# Patient Record
Sex: MALE | Race: WHITE | NOT HISPANIC OR LATINO | Employment: OTHER | URBAN - NONMETROPOLITAN AREA
[De-identification: names, ages, dates, MRNs, and addresses within clinical notes are randomized per-mention and may not be internally consistent; named-entity substitution may affect disease eponyms.]

---

## 2017-01-13 ENCOUNTER — OFFICE VISIT - GICH (OUTPATIENT)
Dept: INTERNAL MEDICINE | Facility: OTHER | Age: 62
End: 2017-01-13

## 2017-01-13 ENCOUNTER — HISTORY (OUTPATIENT)
Dept: INTERNAL MEDICINE | Facility: OTHER | Age: 62
End: 2017-01-13

## 2017-01-13 DIAGNOSIS — I48.91 ATRIAL FIBRILLATION (H): ICD-10-CM

## 2017-01-13 LAB
ANION GAP - HISTORICAL: 4 (ref 5–18)
BUN SERPL-MCNC: 19 MG/DL (ref 7–25)
BUN/CREAT RATIO - HISTORICAL: 15
CALCIUM SERPL-MCNC: 9.6 MG/DL (ref 8.6–10.3)
CHLORIDE SERPLBLD-SCNC: 106 MMOL/L (ref 98–107)
CO2 SERPL-SCNC: 27 MMOL/L (ref 21–31)
CREAT SERPL-MCNC: 1.29 MG/DL (ref 0.7–1.3)
ERYTHROCYTE [DISTWIDTH] IN BLOOD BY AUTOMATED COUNT: 11.4 % (ref 11.5–15.5)
GFR IF NOT AFRICAN AMERICAN - HISTORICAL: 57 ML/MIN/1.73M2
GLUCOSE SERPL-MCNC: 82 MG/DL (ref 70–105)
HCT VFR BLD AUTO: 46.7 % (ref 37–53)
HEMOGLOBIN: 15.9 G/DL (ref 13.5–17.5)
MCH RBC QN AUTO: 32.1 PG (ref 26–34)
MCHC RBC AUTO-ENTMCNC: 34.1 G/DL (ref 32–36)
MCV RBC AUTO: 94 FL (ref 80–100)
PLATELET # BLD AUTO: 191 THOU/CU MM (ref 140–440)
PMV BLD: 10.2 FL (ref 6.5–11)
POTASSIUM SERPL-SCNC: 4.3 MMOL/L (ref 3.5–5.1)
RED BLOOD COUNT - HISTORICAL: 4.97 MIL/CU MM (ref 4.3–5.9)
SODIUM SERPL-SCNC: 137 MMOL/L (ref 133–143)
WHITE BLOOD COUNT - HISTORICAL: 8.4 THOU/CU MM (ref 4.5–11)

## 2017-01-16 ENCOUNTER — AMBULATORY - GICH (OUTPATIENT)
Dept: INTERNAL MEDICINE | Facility: OTHER | Age: 62
End: 2017-01-16

## 2017-01-17 ENCOUNTER — HISTORY (OUTPATIENT)
Dept: EMERGENCY MEDICINE | Facility: OTHER | Age: 62
End: 2017-01-17

## 2017-01-17 ENCOUNTER — MEDICAL CORRESPONDENCE (OUTPATIENT)
Facility: CLINIC | Age: 62
End: 2017-01-17
Payer: COMMERCIAL

## 2017-01-17 ENCOUNTER — HOSPITAL ENCOUNTER (OUTPATIENT)
Dept: RADIOLOGY | Facility: OTHER | Age: 62
End: 2017-01-17
Attending: INTERNAL MEDICINE

## 2017-01-17 ENCOUNTER — TRANSFERRED RECORDS (OUTPATIENT)
Dept: HEALTH INFORMATION MANAGEMENT | Facility: CLINIC | Age: 62
End: 2017-01-17

## 2017-01-17 DIAGNOSIS — I48.91 ATRIAL FIBRILLATION (H): ICD-10-CM

## 2017-01-17 PROCEDURE — 93306 TTE W/DOPPLER COMPLETE: CPT | Mod: 26 | Performed by: INTERNAL MEDICINE

## 2017-01-19 ENCOUNTER — HISTORY (OUTPATIENT)
Dept: INTERNAL MEDICINE | Facility: OTHER | Age: 62
End: 2017-01-19

## 2017-01-20 ENCOUNTER — OFFICE VISIT - GICH (OUTPATIENT)
Dept: INTERNAL MEDICINE | Facility: OTHER | Age: 62
End: 2017-01-20

## 2017-01-20 ENCOUNTER — HISTORY (OUTPATIENT)
Dept: INTERNAL MEDICINE | Facility: OTHER | Age: 62
End: 2017-01-20

## 2017-01-20 DIAGNOSIS — N18.9 CHRONIC KIDNEY DISEASE: ICD-10-CM

## 2017-01-20 DIAGNOSIS — I48.91 ATRIAL FIBRILLATION (H): ICD-10-CM

## 2017-01-20 DIAGNOSIS — I48.92 ATRIAL FLUTTER (H): ICD-10-CM

## 2017-01-20 DIAGNOSIS — R35.1 NOCTURIA: ICD-10-CM

## 2017-01-20 DIAGNOSIS — J06.9 ACUTE UPPER RESPIRATORY INFECTION: ICD-10-CM

## 2017-01-20 DIAGNOSIS — N17.9 ACUTE KIDNEY FAILURE (H): ICD-10-CM

## 2017-01-20 LAB
CREAT SERPL-MCNC: 1.25 MG/DL (ref 0.7–1.3)
GFR IF NOT AFRICAN AMERICAN - HISTORICAL: 59 ML/MIN/1.73M2

## 2017-01-26 ENCOUNTER — HISTORIC RESULTS (OUTPATIENT)
Dept: ADMINISTRATIVE | Age: 62
End: 2017-01-26

## 2017-01-31 ENCOUNTER — HISTORIC RESULTS (OUTPATIENT)
Dept: ADMINISTRATIVE | Age: 62
End: 2017-01-31

## 2017-02-01 ENCOUNTER — AMBULATORY - GICH (OUTPATIENT)
Dept: INTERNAL MEDICINE | Facility: OTHER | Age: 62
End: 2017-02-01

## 2017-02-01 ENCOUNTER — AMBULATORY - GICH (OUTPATIENT)
Dept: FAMILY MEDICINE | Facility: OTHER | Age: 62
End: 2017-02-01

## 2017-02-14 ENCOUNTER — AMBULATORY - GICH (OUTPATIENT)
Dept: FAMILY MEDICINE | Facility: OTHER | Age: 62
End: 2017-02-14

## 2017-02-14 DIAGNOSIS — I48.19 PERSISTENT ATRIAL FIBRILLATION (H): ICD-10-CM

## 2017-02-16 ENCOUNTER — HISTORIC RESULTS (OUTPATIENT)
Dept: ADMINISTRATIVE | Age: 62
End: 2017-02-16

## 2017-03-21 ENCOUNTER — AMBULATORY - GICH (OUTPATIENT)
Dept: FAMILY MEDICINE | Facility: OTHER | Age: 62
End: 2017-03-21

## 2017-03-21 DIAGNOSIS — I48.19 PERSISTENT ATRIAL FIBRILLATION (H): ICD-10-CM

## 2017-03-23 ENCOUNTER — HISTORIC RESULTS (OUTPATIENT)
Dept: ADMINISTRATIVE | Age: 62
End: 2017-03-23

## 2017-07-31 ENCOUNTER — OFFICE VISIT - GICH (OUTPATIENT)
Dept: FAMILY MEDICINE | Facility: OTHER | Age: 62
End: 2017-07-31

## 2017-07-31 ENCOUNTER — HISTORY (OUTPATIENT)
Dept: FAMILY MEDICINE | Facility: OTHER | Age: 62
End: 2017-07-31

## 2017-07-31 DIAGNOSIS — L72.0 EPIDERMAL CYST: ICD-10-CM

## 2017-10-18 ENCOUNTER — AMBULATORY - GICH (OUTPATIENT)
Dept: FAMILY MEDICINE | Facility: OTHER | Age: 62
End: 2017-10-18

## 2017-10-18 DIAGNOSIS — Z23 ENCOUNTER FOR IMMUNIZATION: ICD-10-CM

## 2017-10-20 ENCOUNTER — AMBULATORY - GICH (OUTPATIENT)
Dept: FAMILY MEDICINE | Facility: OTHER | Age: 62
End: 2017-10-20

## 2017-11-09 ENCOUNTER — OFFICE VISIT - GICH (OUTPATIENT)
Dept: FAMILY MEDICINE | Facility: OTHER | Age: 62
End: 2017-11-09

## 2017-11-09 ENCOUNTER — HISTORY (OUTPATIENT)
Dept: FAMILY MEDICINE | Facility: OTHER | Age: 62
End: 2017-11-09

## 2017-11-09 DIAGNOSIS — Z12.5 ENCOUNTER FOR SCREENING FOR MALIGNANT NEOPLASM OF PROSTATE: ICD-10-CM

## 2017-11-09 DIAGNOSIS — I48.0 PAROXYSMAL ATRIAL FIBRILLATION (H): ICD-10-CM

## 2017-11-09 LAB
ANION GAP - HISTORICAL: 12 (ref 5–18)
BUN SERPL-MCNC: 20 MG/DL (ref 7–25)
BUN/CREAT RATIO - HISTORICAL: 18
CALCIUM SERPL-MCNC: 9.7 MG/DL (ref 8.6–10.3)
CHLORIDE SERPLBLD-SCNC: 104 MMOL/L (ref 98–107)
CO2 SERPL-SCNC: 27 MMOL/L (ref 21–31)
CREAT SERPL-MCNC: 1.14 MG/DL (ref 0.7–1.3)
GFR IF NOT AFRICAN AMERICAN - HISTORICAL: >60 ML/MIN/1.73M2
GLUCOSE SERPL-MCNC: 96 MG/DL (ref 70–105)
POTASSIUM SERPL-SCNC: 4.8 MMOL/L (ref 3.5–5.1)
PSA TOTAL (DIAGNOSTIC) - HISTORICAL: 1.23 NG/ML
SODIUM SERPL-SCNC: 143 MMOL/L (ref 133–143)

## 2017-11-13 ENCOUNTER — AMBULATORY - GICH (OUTPATIENT)
Dept: SCHEDULING | Facility: OTHER | Age: 62
End: 2017-11-13

## 2017-12-28 NOTE — PROGRESS NOTES
"Patient Information     Patient Name MRN Sex Darron Mora [id number] Male 1955      Progress Notes by Murali Brown MD at 2017  3:45 PM     Author:  Murali Brown MD Service:  (none) Author Type:  Physician     Filed:  2017  4:39 PM Encounter Date:  2017 Status:  Signed     :  Murali Brown MD (Physician)            SUBJECTIVE:  Darron Tatum is a 61 y.o. male who presents for evaluation of a lump on his back. This has been present for quite some time and over the past couple of weeks has become slightly irritated. Has noted some itching in this region. His wife has noticed that this appears a little red. He previously had an inclusion cyst removed from his neck.    No Known Allergies and   Current Outpatient Prescriptions on File Prior to Visit       Medication  Sig Dispense Refill     medication order composer 1 Cap once daily. Nutraview,  Lutein       sotalol (BETAPACE) 80 mg tablet Take 1 tablet by mouth 2 times daily before meals. 180 tablet 3     travoprost 0.004% (TRAVATAN Z) 0.004 % ophthalmic solution Place 1 Drop into both eyes at bedtime. 1 Bottle 0     No current facility-administered medications on file prior to visit.        OBJECTIVE:  /80  Temp 97.2  F (36.2  C) (Temporal)  Ht 1.911 m (6' 3.25\")  Wt 88.5 kg (195 lb)  BMI 24.21 kg/m2  EXAM:  General Appearance: Pleasant, alert, appropriate appearance for age. No acute distress.  Skin: Typical appearing inclusion cyst noted over the left upper back. This measures about 2 cm in maximal diameter and is mildly erythematous. No fluctuance or tenderness. A tiny amount of drainage is noted.    ASSESSMENT/Plan :      ICD-10-CM    1. Inclusion cyst  Recommended periodic application of heat to this region. Return for consideration of I&D if worsening. Once current inflammation resolves, he may have this electively excised if desired.  L72.0        Murali Brown MD          "

## 2017-12-28 NOTE — PROGRESS NOTES
"Patient Information     Patient Name MRN Sex Darron Mora [id number] Male 1955      Progress Notes by Murali Brown MD at 2017  9:45 AM     Author:  Murali Brown MD Service:  (none) Author Type:  Physician     Filed:  2017 12:31 PM Encounter Date:  2017 Status:  Signed     :  Murali Brown MD (Physician)            PREOPERATIVE CLEARANCE  Date of Exam: 2017    Nursing Notes:   Yvonne Bob  2017 10:13 AM  Signed  Date of Surgery: 17  Type of Surgery: colonoscopy  Surgeon: Dr. Cortes  Hospital:  [facility]      Fever/Chills or other infectious symptoms in past month: no  >10lb weight loss in past two months: no  O2 SAT: 97    Health Care Directive/Code status:  yes  Hx of blood transfusions:   (NO)   Td up to date:    History of VRE/MRSA:  (NO)     Preoperative Evaluation: Obstructive Sleep Apnea screening    S: Snore -  Do you snore loudly? (louder than talking or loud enough to be heard through closed doors)(NO)  T: Tired - Do you often feel tired, fatigued, or sleepy during the daytime?(YES)  O: Observed - Has anyone ever observed you stop breathing during your sleep?(NO)  P: Pressure - Do you have or are you being treated for high blood pressure?(NO)  B: BMI - BMI greater than 35kg/m2?(NO)  A: Age - Age over 50 years old?(YES)  N: Neck - Neck circumference greater than 40 cm?(NO)  G: Gender - Gender: Male?(YES)    Total number of \"YES\" responses:  3    Scoring: Low risk of GAMAL 0-2  At Risk of GAMAL: >3 High Risk of GAMAL: 5-8            HPI:  Darron Tatum is a 61 y.o. male who presents for preoperative clearance as noted above. He has a history of paroxysmal atrial fibrillation undergoing elective cardioversion in February of this year. He continues on sotalol and believes he has been in sinus rhythm since.    Problem List:   Patient Active Problem List     Diagnosis  Code     NEPHROLITHIASIS, HX OF Z87.442     INCREASED " INTRAOCULAR PRESSURE H40.009     Actinic keratosis of left cheek L57.0     BPH (benign prostatic hypertrophy) N40.0     Onychomycosis B35.1     Paroxysmal atrial fibrillation (HC) I48.0      Histories:  Past Medical History:     Diagnosis  Date     Atrial fibrillation (HC) 10/31/2014     BPH (benign prostatic hypertrophy) 9/26/2013     Kidney stone     passed spontaneous, age of 30      Preglaucoma       Past Surgical History:      Procedure  Laterality Date     CARDIOVERSION  02/2017    Atrial Fibrillation       COLONOSCOPY SCREENING  2007, 2012    Follow up 2017, history of polyps       HAND FINGER SURGERY      repair, left long finger injury       HERNIA REPAIR  1998    Right, repair with mesh       REPAIR RETINAL DETACH  20s    Retinal hole       Social History     Social History        Marital status:       Spouse name: N/A     Number of children:  N/A     Years of education:  N/A     Occupational History      Not on file.     Social History Main Topics          Smoking status:   Never Smoker      Smokeless tobacco:   Never Used      Alcohol use   2.5 oz/week     5 Standard drinks or equivalent per week        Comment: glass of Wine, 5-6 times a week       Drug use:   No      Sexual activity:   Yes      Partners:  Female      Birth control/ protection:  Post-menopausal      Other Topics   Concern      Service  No     Blood Transfusions  No     Caffeine Concern  Yes     2 cups daily      Sleep Concern  No     Exercise  Yes     Seat Belt  Yes     Social History Narrative      at the age of 41 and has two grown stepsons. Retired.  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  Wife teaches piano lessons.                 Family History       Problem   Relation Age of Onset     Cancer  Mother      brain tumor       Hypertension  Mother      Genitourinary Disease  Father      BPH       Other  Father      peptic ulcer disease       Other  Brother      detached retina       Genitourinary  "Disease  Brother      BPH       Skin cancer  Brother      Other  Brother      detached retina       Heart Disease  Brother      atrial fibrillation/flutter with TIA's       Other  Sister      osteopenia        Allergies: Review of patient's allergies indicates no known allergies.   Latex Allergy: no    Current Medications:  Current Outpatient Rx       Medication  Sig Dispense Refill     medication order composer 1 Cap once daily. Nutraview,  Lutein       sotalol (BETAPACE) 80 mg tablet Take 1 tablet by mouth 2 times daily before meals. 180 tablet 3     travoprost 0.004% (TRAVATAN Z) 0.004 % ophthalmic solution Place 1 Drop into both eyes at bedtime. 1 Bottle 0     Medications have been reviewed by me and are current to the best of my knowledge and ability.    Recent use of: no recent use of aspirin (ASA), NSAIDS or steroids    HABITS:   Social History       Substance Use Topics         Smoking status:   Never Smoker     Smokeless tobacco:   Never Used     Alcohol use   2.5 oz/week     5 Standard drinks or equivalent per week        Comment: glass of Wine, 5-6 times a week    Tetanus up to date yes    Anesthesia Complications: None  History of abnormal bleeding : None  History of Blood Transfusions: No    Health Care Directive or Living Will: no    REVIEW OF SYSTEMS:  Respiratory: Negative  Cardiovascular: Negative  Gastrointestinal: Negative        EXAM:   /76  Pulse 54  Temp 96.8  F (36  C) (Temporal)   Ht 1.911 m (6' 3.25\")  Wt 88.6 kg (195 lb 4 oz)  SpO2 97%  BMI 24.24 kg/m2 Body mass index is 24.24 kg/(m^2).  EXAM:  General Appearance: Pleasant, alert, appropriate appearance for age. No acute distress  OroPharynx Exam:  Dental hygiene adequate. Normal buccal mucosa. Normal pharynx.  Neck Exam:  Supple, no masses or nodes.  Thyroid Exam: No nodules or enlargement.  Chest/Respiratory Exam: Normal chest wall and respirations. Clear to auscultation.  Cardiovascular Exam: Bradycardic rate with a regular " rhythm, no murmurs.  Gastrointestinal Exam: Soft, non-tender, no masses or organomegaly.  Rectal Exam: Prostate is mildly enlarged without nodularity or tenderness.  Genitourinary Exam Male: Normal male genitalia. No discharge or penile ulcerations. No testicular masses or swelling.  Lymphatic Exam: Non-palpable nodes in neck, clavicular, axillary, or inguinal regions.  Musculoskeletal Exam: Back is straight and non-tender, full ROM of upper and lower extremities.  Foot Exam: Left and right foot: good pedal pulses, no lesions, nail hygiene good.  Skin: no rash or abnormalities  Neurologic Exam: Nonfocal, symmetric DTRs, normal gross motor, tone coordination and no tremor.  Psychiatric Exam: Alert and oriented - appropriate affect.    Results for orders placed or performed in visit on 11/09/17      BASIC METABOLIC PANEL      Result  Value Ref Range    SODIUM 143 133 - 143 mmol/L    POTASSIUM 4.8 3.5 - 5.1 mmol/L    CHLORIDE 104 98 - 107 mmol/L    CO2,TOTAL 27 21 - 31 mmol/L    ANION GAP 12 5 - 18                    GLUCOSE 96 70 - 105 mg/dL    CALCIUM 9.7 8.6 - 10.3 mg/dL    BUN 20 7 - 25 mg/dL    CREATININE 1.14 0.70 - 1.30 mg/dL    BUN/CREAT RATIO           18                    GFR if African American >60 >60 ml/min/1.73m2    GFR if not African American >60 >60 ml/min/1.73m2   PSA, TOTAL      Result  Value Ref Range    PSA TOTAL (DIAGNOSTIC) 1.225 <=3.100 ng/mL       DIAGNOSTICS:   1. EKG: EKG FINDINGS - sinus bradycardia with nonspecific ST abnormality not significantly changed compared to previous.  2. CXR: Not indicated  3. Labs: see attached    IMPRESSION:   Darron Tatum is a 61 y.o. male scheduled for colonoscopy.    For above listed surgery and anesthesia:   Patient is low risk for perioperative complications.    RECOMMENDATIONS: proceed without further diagnostic evaluation.    Electronically Signed by: Murali Brown MD   11/9/2017

## 2017-12-28 NOTE — TELEPHONE ENCOUNTER
Patient Information     Patient Name MRN Darron Molina [id number] Male 1955      Telephone Encounter by Murali Brown MD at 10/23/2017  7:24 AM     Author:  Murali Brown MD Service:  (none) Author Type:  Physician     Filed:  10/23/2017  7:24 AM Encounter Date:  10/20/2017 Status:  Signed     :  Murali Brown MD (Physician)            I am okay renewing his sotalol when the time comes.

## 2017-12-30 NOTE — NURSING NOTE
"Patient Information     Patient Name MRN Sex Darron Mora [id number] Male 1955      Nursing Note by Yvonne Bob at 2017  9:45 AM     Author:  Yvonne Bob Service:  (none) Author Type:  (none)     Filed:  2017 10:13 AM Encounter Date:  2017 Status:  Signed     :  Yvonne Bob            Date of Surgery: 17  Type of Surgery: colonoscopy  Surgeon: Dr. Cortes  Hospital:  [facility]      Fever/Chills or other infectious symptoms in past month: no  >10lb weight loss in past two months: no  O2 SAT: 97    Health Care Directive/Code status:  yes  Hx of blood transfusions:   (NO)   Td up to date:    History of VRE/MRSA:  (NO)     Preoperative Evaluation: Obstructive Sleep Apnea screening    S: Snore -  Do you snore loudly? (louder than talking or loud enough to be heard through closed doors)(NO)  T: Tired - Do you often feel tired, fatigued, or sleepy during the daytime?(YES)  O: Observed - Has anyone ever observed you stop breathing during your sleep?(NO)  P: Pressure - Do you have or are you being treated for high blood pressure?(NO)  B: BMI - BMI greater than 35kg/m2?(NO)  A: Age - Age over 50 years old?(YES)  N: Neck - Neck circumference greater than 40 cm?(NO)  G: Gender - Gender: Male?(YES)    Total number of \"YES\" responses:  3    Scoring: Low risk of GAMAL 0-2  At Risk of GAMAL: >3 High Risk of GAMAL: 5-8              "

## 2017-12-30 NOTE — NURSING NOTE
Patient Information     Patient Name MRN Sex Darron Mora [id number] Male 1955      Nursing Note by Yvonne Bob at 2017  3:45 PM     Author:  Yvonne Bob Service:  (none) Author Type:  (none)     Filed:  2017  3:55 PM Encounter Date:  2017 Status:  Signed     :  Yvonne Bob            Patient presents today with a spot on his back that he thinks is a cyst.  Yvonne Bob LPN  2017  3:49 PM

## 2018-01-02 NOTE — NURSING NOTE
Patient Information     Patient Name MRN Sex Darron Mora [id number] Male 1955      Nursing Note by Lara Morin at 2017 10:50 AM     Author:  Lara Morin Service:  (none) Author Type:  (none)     Filed:  2017 11:03 AM Encounter Date:  2017 Status:  Signed     :  Lara Morin            Patient presents to clinic after experiencing episodes of shortness of breath, tachycardia with A-Fib for past 3 days.  Cardiologist at [facility] requested he be seen for EKG.    Lara Morin LPN..................2017  10:58 AM

## 2018-01-03 NOTE — TELEPHONE ENCOUNTER
Patient Information     Patient Name MRN Sex Darron Mora [id number] Male 1955      Telephone Encounter by Alberta Fan DO at 2017  8:10 AM     Author:  Alberta Fan DO Service:  (none) Author Type:  PHYS- Osteopathic     Filed:  2017  8:10 AM Encounter Date:  2017 Status:  Signed     :  Alberta Fan DO (PHYS- Osteopathic)            Patient called.  Echo rescheduled.  He will see me in follow up Friday.

## 2018-01-03 NOTE — NURSING NOTE
Patient Information     Patient Name MRN Sex Darron Mora [id number] Male 1955      Nursing Note by Lara Morin at 2017  3:30 PM     Author:  Lara Morin Service:  (none) Author Type:  (none)     Filed:  2017  3:36 PM Encounter Date:  2017 Status:  Signed     :  Lara Morin            Patient presents to clinic for follow up with A-Fib.    Lara Morin LPN..................2017  3:31 PM

## 2018-01-03 NOTE — PATIENT INSTRUCTIONS
Patient Information     Patient Name MRN Sex Darron Mora [id number] Male 1955      Patient Instructions by Alberta Fan DO at 2017 10:50 AM     Author:  Alberta Fan DO Service:  (none) Author Type:  PHYS- Osteopathic     Filed:  2017 11:35 AM Encounter Date:  2017 Status:  Signed     :  Alberta Fan DO (PHYS- Osteopathic)            Take the metoprolol when you get home today.  Repeat x1 if your heart rate does not decrease after a couple hours.  If you have any worsening symptoms please be sure to come into the ER over the weekend.  Let me know next week how you are doing.  This can be through a medical message or phone call.  I will look for the results of your echo next week.

## 2018-01-03 NOTE — PROGRESS NOTES
Patient Information     Patient Name MRN Sex Darron Mora [id number] Male 1955      Progress Notes by Yulia Torres RN at 2/15/2017  2:58 PM     Author:  Yulia Torres RN Service:  (none) Author Type:  NURS- Registered Nurse     Filed:  2/15/2017  2:58 PM Encounter Date:  2017 Status:  Signed     :  Yulia Torres RN (NURS- Registered Nurse)            See telephone encounter.     YULIA TORRES, RN

## 2018-01-03 NOTE — PATIENT INSTRUCTIONS
Patient Information     Patient Name MRN Sex Darron Mora [id number] Male 1955      Patient Instructions by Alberta Fan DO at 2017  3:30 PM     Author:  Alberta Fan DO Service:  (none) Author Type:  PHYS- Osteopathic     Filed:  2017  4:05 PM Encounter Date:  2017 Status:  Signed     :  Alberta Fan DO (PHYS- Osteopathic)            [facility] should be contacting you for an appointment next time    For symptomatic relief you may try:    Nasal congestion  - pseudoephedrine 60mg every 4 hours as needed  - afrin nasal spray for no more than 3 consecutive days  - loratadine 10mg once daily as needed     Cough  - Guaifenesin DM (generic Robitussin DM) as needed     Sore throat  - cepacol or other throat lozenges as needed  - gargle salt water (1/2 teaspoon salt in 8oz warm water) every 1-2 hours    Headache, body aches, pain, sinus pressure or fever  - acetaminophen 1000mg every 6 hours as needed (max of 8 - 500mg pills daily)  - Caution with NSAIDS  (ibuprofen, aspirin, naproxen, aleve, advil) due to risk for increased blood pressure, stomach pain/nausea/ulcers and kidney damage; use minimal amount necessary  - may use oral decongestant If you choose pseudoephedrine, use for only 5-7 days AS DIRECTED. Speak to your pharmacist if you have any concerns about your medications.     General  - get extra rest  - drink plenty of fluid  - avoid tobacco products    You will need to be evaluated if you start to experience:   Fever higher than 102.5 F (39.2 C)   Worsening of current symptoms  Sudden and severe pain in the face and head or trouble seeing or thinking clearly   Swelling or redness around 1 or both eyes   Trouble breathing, chest pain or a stiff neck    * If you are a smoker, try to quit *

## 2018-01-03 NOTE — PROGRESS NOTES
Patient Information     Patient Name MRN Sex Darron Mora [id number] Male 1955      Progress Notes by Alberta Fan DO at 2017  3:30 PM     Author:  Alberta Fan DO Service:  (none) Author Type:  PHYS- Osteopathic     Filed:  2017  4:38 PM Encounter Date:  2017 Status:  Signed     :  Alberta Fan DO (PHYS- Osteopathic)            Chief Complaint     Patient presents with       Follow Up      A-Fib         HPI: Mr. Tatum is a 61 y.o. male who presents today for follow up of atrial fibrillation/flutter.  He was seen one week ago at which time we ordered an echocardiogram.  This was essentially normal.  Echocardiogram was done on Tuesday however shortly after this he developed increased heart rate along with slightly low blood pressure after taking a second dose of his metoprolol and came into the emergency department.  He has had paroxysmal atrial fibrillation for the last 2 years.  He did previously see cardiology once in [place] however at this time would prefer to be seen through [facility] for ongoing cares given this and now persistent nature of his arrhythmia.  Since I saw him last week he has continued to be in A. fib/flutter.  His heart rate has been well controlled with metoprolol at the 50 mg dose.  This is even with exercise.  When he was seen in the ER he was started on Xarelto for possible future cardioversion.  He denies any bleeding or abdominal pain with this.  He denies any chest pain, dyspnea on exertion, orthopnea or lower extremity edema.    He did develop an upper respiratory infection a couple days ago.  He is curious what he can take for symptomatic control of this.  He overall has been feeling okay despite this.    He does have noted stage III chronic kidney disease dating back to at least .  He was noted to have acute on chronic kidney injury when he was seen in the ER earlier this week.  He denies any obvious symptoms although has  "noted some increased difficulty over the last few weeks with urinary hesitancy at night.  He does have a history of BPH with nocturia and his last PSA was done just over a year ago.    He  reports that he has never smoked. He has never used smokeless tobacco.    Past medical history reviewed as below:     Past Medical History      Diagnosis   Date     Atrial fibrillation (HC)  10/31/2014     BPH (benign prostatic hypertrophy)  9/26/2013     Kidney stone       passed spontaneous, age of 30      Preglaucoma     .      ROS  Pertinent ROS was performed and was negative, including for fever, chills, changes in bowel or bladder. No other concerns, with exception of HPI above.      EXAM:   /74  Pulse 76  Temp 98.1  F (36.7  C) (Tympanic)  Resp 18  Ht 1.918 m (6' 3.5\")  Wt 88.9 kg (196 lb)  BMI 24.17 kg/m2    Estimated body mass index is 24.17 kg/(m^2) as calculated from the following:    Height as of this encounter: 1.918 m (6' 3.5\").    Weight as of this encounter: 88.9 kg (196 lb).      GEN: Vitals reviewed.  Healthy appearing. Patient is in no acute distress. Cooperative with exam.  CV: Heart irregularly irregular with no murmur and normal rate.  Radial pulses palpable.  LUNGS: Lungs clear to auscultation bilaterally.  Chest rise equal bilaterally.  No accessory muscle use.  SKIN: Warm and dry to touch.  No rash on face, arms and legs.  EXT: No clubbing or cyanosis.  No peripheral edema.  PSYCH: Mood is good.  Affect appropriate. Speech fluent. Answers questions appropriately and thought process normal.     ASSESSMENT AND PLAN:    1. Atrial fibrillation and flutter (HC)  - in atrial fibrillation today.  - rate controlled currently with metoprolol 50 mg daily.  - Anticoagulation currently with Xarelto  - CHADVASC score of 0  - we will long discussion today regarding various options for treatment which will ultimately be deferred to cardiology.  Questions were answered regarding cardioversion, ablation, medications " and long-term anticoagulation.  - AMB CONSULT TO CARDIOLOGY; Future    2. Upper respiratory tract infection, unspecified type  - Likely viral in nature.  Recommend conservative treatment including symptomatic relief as below.    - Patient to call if symptoms do not improve and will consider antibiotics at that time.    - Recommend increased fluid intake, humidified air and rest as much as possible.    3. Acute-on-chronic kidney injury (HC)  - recheck creatinine today given his recent increase to be sure that it has returned to his baseline of approximately 1.25  - CREATININE; Future  - CREATININE    4. Nocturia  - patient is to monitor symptoms at this time.  If he continues to have worsening I would recommend repeating a PSA along with exam.  We did discuss medications and he would call if he wanted any of these at any point.        Patient Instructions   [facility] should be contacting you for an appointment next time    For symptomatic relief you may try:    Nasal congestion  - pseudoephedrine 60mg every 4 hours as needed  - afrin nasal spray for no more than 3 consecutive days  - loratadine 10mg once daily as needed     Cough  - Guaifenesin DM (generic Robitussin DM) as needed     Sore throat  - cepacol or other throat lozenges as needed  - gargle salt water (1/2 teaspoon salt in 8oz warm water) every 1-2 hours    Headache, body aches, pain, sinus pressure or fever  - acetaminophen 1000mg every 6 hours as needed (max of 8 - 500mg pills daily)  - Caution with NSAIDS  (ibuprofen, aspirin, naproxen, aleve, advil) due to risk for increased blood pressure, stomach pain/nausea/ulcers and kidney damage; use minimal amount necessary  - may use oral decongestant If you choose pseudoephedrine, use for only 5-7 days AS DIRECTED. Speak to your pharmacist if you have any concerns about your medications.     General  - get extra rest  - drink plenty of fluid  - avoid tobacco products    You will need to be  evaluated if you start to experience:   Fever higher than 102.5 F (39.2 C)   Worsening of current symptoms  Sudden and severe pain in the face and head or trouble seeing or thinking clearly   Swelling or redness around 1 or both eyes   Trouble breathing, chest pain or a stiff neck    * If you are a smoker, try to quit *          SINA KING,    1/20/2017 4:27 PM

## 2018-01-03 NOTE — NURSING NOTE
Patient Information     Patient Name MRN Sex Darron Mora [id number] Male 1955      Nursing Note by Ayan Parks RN at 2017 10:30 AM     Author:  Ayan Parks RN Service:  (none) Author Type:  NURS- Registered Nurse     Filed:  2017 10:56 AM Encounter Date:  2017 Status:  Signed     :  Ayan Parks RN (NURS- Registered Nurse)            EKG for pt  Requested by Dr Bo thru [facility]. Pt no symptoms . Pt reports he had a cardioversion last Wed and this is follow up from that. Papers to [facility] to faxed to Dr Bo.  AYAN PARKS RN ....................  2017   10:49 AM

## 2018-01-03 NOTE — PROGRESS NOTES
"Patient Information     Patient Name MRDarrno Montero [id number] Male 1955      Progress Notes by Alberta Fan DO at 2017 10:50 AM     Author:  Alberta Fan DO Service:  (none) Author Type:  PHYS- Osteopathic     Filed:  2017  7:12 AM Encounter Date:  2017 Status:  Signed     :  Alberta Fan DO (PHYS- Osteopathic)            Chief Complaint     Patient presents with       Atrial Fibrillation      episodes shortness of breath, tachycardia        Subjective:   Mr. Tatum is a 61 y.o. male  seen for the acute concern today of increased heart rate with atrial fibrillation.  He states that he first went into each of fibrillation 2 years ago.  He had a hospitalization related to this.  He then did not have any additional episodes until one year ago at which time he was sent to cardiology and had an echocardiogram.  This was essentially normal.  He states that in the last year he has had what he calls \"minor episodes\" where his tachycardia lasted less than 24 hours and went away with a dose of metoprolol.  He does not take metoprolol daily however does have a prescription for metoprolol 25 mg XL.  He states that the episode this week started on Wednesday.  He did play racquetball and noted that his heart rate became irregular.  It has been in the sixties to eighties and irregular since then.  Today after exercising it increased to 145 and despite taking his metoprolol has not decreased.    He states that when he takes his beta blocker he does note that his blood pressure can drop low at times.    His past medical history is otherwise relatively noncontributory.  He does have a history of nephrolithiasis.  He has no known allergies.  He  reports that he has never smoked. He has never used smokeless tobacco.  He is not on any other oral medications and denies any over-the-counter supplements.    Past medical history reviewed as below:     Past Medical History      Diagnosis   " "Date     Atrial fibrillation (HC)  10/31/2014     BPH (benign prostatic hypertrophy)  9/26/2013     Kidney stone       passed spontaneous, age of 30      Preglaucoma     .    ROS:   Pertinent ROS was performed and was negative, including for fever, chills, changes in bowel or bladder. No other concerns, with exception of HPI above.      Objective:    /80  Pulse (!) 144  Temp 96.3  F (35.7  C) (Tympanic)  Resp 18  Ht 1.918 m (6' 3.5\")  Wt 89 kg (196 lb 2 oz)  SpO2 98%  BMI 24.19 kg/m2  GEN: Vitals reviewed.  Patient is in no acute distress. Cooperative with exam.  HEENT: Normocephalic atraumatic.  Pupils equally round.  No scleral icterus, no conjunctival erythema. Nares patent.  Oropharynx with no erythema or exudates. Dentition adequate.  CV: Heart irregular irregular with a rate of 144.  No murmur.    LUNGS: Lungs clear to auscultation bilaterally.  Chest rise equal bilaterally.  No accessory muscle use.  SKIN: Warm and dry to touch.  No rash on face, arms and legs.  EXT: No clubbing or cyanosis.  No peripheral edema.     Assessment/Plan:   1. Atrial fibrillation with RVR (HC)  - given patient's ongoing A. fib with RVR I do think he needs further evaluation and treatment of this.  Currently he is asymptomatic other than when he exerts himself.  At this time as long as he continues to be symptom-free I do think he can be treated at home.  He is instructed to repeat his metoprolol this afternoon and monitor for improvement.  If he does not have any return to a normal rate he can repeat it one additional time for a total of 75 mg of metoprolol today.  He was encouraged to take it easy today and over the weekend.  He was strictly informed that no exertion including racquetball or other strenuous sports should be done.  - We will obtain some basic labs today to be sure there is no change since he has not had these in a while.  We will also set him up for an echocardiogram in the next week.  - We discussed today " that if he continues to be in A. fib regardless of his rate that he may benefit from cardioversion but would require anticoagulation first for a month  - He was instructed to stay on a baby aspirin at this time as his chads score is 0  - he was instructed to come into the ER should he have any sustained tachycardia, chest pain, increased shortness of breath or dizziness, low blood pressures or other concerning symptoms.  - EKG 12 LEAD UNIT PERFORMED  - ECHO COMPLETE WO CONTRAST; Future  - BASIC METABOLIC PANEL  - CBC W PLT NO DIFF  - KS ELECTROCARDIOGRAM TRACING      Return if symptoms worsen or fail to improve.    Patient Instructions   Take the metoprolol when you get home today.  Repeat x1 if your heart rate does not decrease after a couple hours.  If you have any worsening symptoms please be sure to come into the ER over the weekend.  Let me know next week how you are doing.  This can be through a medical message or phone call.  I will look for the results of your echo next week.       SINA KING, DO   1/13/2017

## 2018-01-03 NOTE — NURSING NOTE
Patient Information     Patient Name MRN Sex Darron Mora [id number] Male 1955      Nursing Note by Anita Parks RN at 3/21/2017  8:30 AM     Author:  Anita Parks RN  Service:  (none) Author Type:  NURS- Registered Nurse     Filed:  3/21/2017  9:19 AM  Encounter Date:  3/21/2017 Status:  Addendum     :  Anita Parks RN (NURS- Registered Nurse)        Related Notes: Original Note by Anita Parks RN (NURS- Registered Nurse) filed at 3/21/2017  9:10 AM            Pt presents asymptomatic for EKG in follow up for medication ordered by Dr Bo. Reports a cardioversion a little over a month a go. Pt came back with abnormal EKG, possible acute MI. Previous EKG printed for comparison and manual VS of 100/70 and pulse 48, pt on Sotalol. Taken to Dr Franklin for review al and noted comparison ekg's no difference per MD  . Advised for pt to report if any symptoms occur today to MD / ER and to fax EKG to Dr Bo. Pt informed of advise and will report with any symptoms when occur. EKG faxed at 855 am and confirmation received for the fax received  [phone]. ANITA PARKS RN ....................  3/21/2017   9:10 AM   Spoke with Xiao triage RN to let her know that Dr Min Mayes's RN to watch for the EKG on pt. Xiao will fax this to the [facility] where Dr Bo is at today. [phone]. ANITA PARKS RN ....................  3/21/2017   9:19 AM

## 2018-01-24 ENCOUNTER — DOCUMENTATION ONLY (OUTPATIENT)
Dept: FAMILY MEDICINE | Facility: OTHER | Age: 63
End: 2018-01-24

## 2018-01-24 PROBLEM — Z87.442 PERSONAL HISTORY OF URINARY CALCULI: Status: ACTIVE | Noted: 2018-01-24

## 2018-01-24 PROBLEM — H40.009 PREGLAUCOMA: Status: ACTIVE | Noted: 2018-01-24

## 2018-01-24 PROBLEM — I48.0 PAROXYSMAL ATRIAL FIBRILLATION (H): Status: ACTIVE | Noted: 2017-11-09

## 2018-01-24 RX ORDER — SOTALOL HYDROCHLORIDE 80 MG/1
80 TABLET ORAL
COMMUNITY
Start: 2017-02-08 | End: 2019-03-18

## 2018-01-24 RX ORDER — TRAVOPROST OPHTHALMIC SOLUTION 0.04 MG/ML
1 SOLUTION OPHTHALMIC AT BEDTIME
COMMUNITY
Start: 2013-09-26 | End: 2022-09-06 | Stop reason: ALTCHOICE

## 2018-01-25 VITALS
TEMPERATURE: 96.3 F | BODY MASS INDEX: 23.88 KG/M2 | HEIGHT: 76 IN | SYSTOLIC BLOOD PRESSURE: 118 MMHG | DIASTOLIC BLOOD PRESSURE: 80 MMHG | HEART RATE: 144 BPM | RESPIRATION RATE: 18 BRPM | OXYGEN SATURATION: 98 % | WEIGHT: 196.13 LBS

## 2018-01-25 VITALS
HEIGHT: 76 IN | TEMPERATURE: 98.1 F | RESPIRATION RATE: 18 BRPM | HEART RATE: 76 BPM | SYSTOLIC BLOOD PRESSURE: 128 MMHG | WEIGHT: 196 LBS | BODY MASS INDEX: 23.87 KG/M2 | DIASTOLIC BLOOD PRESSURE: 74 MMHG

## 2018-01-25 VITALS
OXYGEN SATURATION: 97 % | WEIGHT: 195.25 LBS | BODY MASS INDEX: 24.28 KG/M2 | HEIGHT: 75 IN | HEART RATE: 54 BPM | SYSTOLIC BLOOD PRESSURE: 118 MMHG | DIASTOLIC BLOOD PRESSURE: 76 MMHG | TEMPERATURE: 96.8 F

## 2018-01-25 VITALS
WEIGHT: 195 LBS | SYSTOLIC BLOOD PRESSURE: 118 MMHG | DIASTOLIC BLOOD PRESSURE: 80 MMHG | HEIGHT: 75 IN | BODY MASS INDEX: 24.25 KG/M2 | TEMPERATURE: 97.2 F

## 2018-07-02 ENCOUNTER — TELEPHONE (OUTPATIENT)
Dept: INTERNAL MEDICINE | Facility: OTHER | Age: 63
End: 2018-07-02

## 2018-07-02 ENCOUNTER — OFFICE VISIT (OUTPATIENT)
Dept: INTERNAL MEDICINE | Facility: OTHER | Age: 63
End: 2018-07-02
Attending: INTERNAL MEDICINE
Payer: COMMERCIAL

## 2018-07-02 VITALS
DIASTOLIC BLOOD PRESSURE: 56 MMHG | TEMPERATURE: 101.2 F | HEART RATE: 68 BPM | SYSTOLIC BLOOD PRESSURE: 126 MMHG | BODY MASS INDEX: 23.89 KG/M2 | WEIGHT: 196.19 LBS | HEIGHT: 76 IN | RESPIRATION RATE: 20 BRPM

## 2018-07-02 DIAGNOSIS — R35.0 URINARY FREQUENCY: ICD-10-CM

## 2018-07-02 DIAGNOSIS — R52 BODY ACHES: ICD-10-CM

## 2018-07-02 DIAGNOSIS — R50.9 FEVER, UNSPECIFIED FEVER CAUSE: Primary | ICD-10-CM

## 2018-07-02 DIAGNOSIS — Z79.899 HIGH RISK MEDICATION USE: ICD-10-CM

## 2018-07-02 LAB
ALBUMIN SERPL-MCNC: 4.3 G/DL (ref 3.5–5.7)
ALBUMIN UR-MCNC: NEGATIVE MG/DL
ALP SERPL-CCNC: 54 U/L (ref 34–104)
ALT SERPL W P-5'-P-CCNC: 36 U/L (ref 7–52)
ANION GAP SERPL CALCULATED.3IONS-SCNC: 6 MMOL/L (ref 3–14)
APPEARANCE UR: CLEAR
AST SERPL W P-5'-P-CCNC: 35 U/L (ref 13–39)
BASOPHILS # BLD AUTO: 0 10E9/L (ref 0–0.2)
BASOPHILS NFR BLD AUTO: 0.1 %
BILIRUB SERPL-MCNC: 0.6 MG/DL (ref 0.3–1)
BILIRUB UR QL STRIP: NEGATIVE
BUN SERPL-MCNC: 14 MG/DL (ref 7–25)
CALCIUM SERPL-MCNC: 9 MG/DL (ref 8.6–10.3)
CHLORIDE SERPL-SCNC: 102 MMOL/L (ref 98–107)
CO2 SERPL-SCNC: 29 MMOL/L (ref 21–31)
COLOR UR AUTO: YELLOW
CREAT SERPL-MCNC: 1.27 MG/DL (ref 0.7–1.3)
DIFFERENTIAL METHOD BLD: ABNORMAL
EOSINOPHIL # BLD AUTO: 0 10E9/L (ref 0–0.7)
EOSINOPHIL NFR BLD AUTO: 0 %
ERYTHROCYTE [DISTWIDTH] IN BLOOD BY AUTOMATED COUNT: 11.9 % (ref 10–15)
GFR SERPL CREATININE-BSD FRML MDRD: 57 ML/MIN/1.7M2
GLUCOSE SERPL-MCNC: 130 MG/DL (ref 70–105)
GLUCOSE UR STRIP-MCNC: NEGATIVE MG/DL
HCT VFR BLD AUTO: 41.2 % (ref 40–53)
HGB BLD-MCNC: 14.2 G/DL (ref 13.3–17.7)
HGB UR QL STRIP: NEGATIVE
IMM GRANULOCYTES # BLD: 0 10E9/L (ref 0–0.4)
IMM GRANULOCYTES NFR BLD: 0.3 %
KETONES UR STRIP-MCNC: NEGATIVE MG/DL
LEUKOCYTE ESTERASE UR QL STRIP: NEGATIVE
LYMPHOCYTES # BLD AUTO: 0.6 10E9/L (ref 0.8–5.3)
LYMPHOCYTES NFR BLD AUTO: 7.5 %
MCH RBC QN AUTO: 32.3 PG (ref 26.5–33)
MCHC RBC AUTO-ENTMCNC: 34.5 G/DL (ref 31.5–36.5)
MCV RBC AUTO: 94 FL (ref 78–100)
MONOCYTES # BLD AUTO: 1.2 10E9/L (ref 0–1.3)
MONOCYTES NFR BLD AUTO: 15.2 %
NEUTROPHILS # BLD AUTO: 6 10E9/L (ref 1.6–8.3)
NEUTROPHILS NFR BLD AUTO: 76.9 %
NITRATE UR QL: NEGATIVE
PH UR STRIP: 5.5 PH (ref 5–7)
PLATELET # BLD AUTO: 141 10E9/L (ref 150–450)
POTASSIUM SERPL-SCNC: 3.7 MMOL/L (ref 3.5–5.1)
PROT SERPL-MCNC: 6.6 G/DL (ref 6.4–8.9)
RBC # BLD AUTO: 4.4 10E12/L (ref 4.4–5.9)
SODIUM SERPL-SCNC: 137 MMOL/L (ref 134–144)
SOURCE: NORMAL
SP GR UR STRIP: 1.02 (ref 1–1.03)
UROBILINOGEN UR STRIP-ACNC: 0.2 EU/DL (ref 0.2–1)
WBC # BLD AUTO: 7.8 10E9/L (ref 4–11)

## 2018-07-02 PROCEDURE — 81003 URINALYSIS AUTO W/O SCOPE: CPT | Performed by: INTERNAL MEDICINE

## 2018-07-02 PROCEDURE — 99214 OFFICE O/P EST MOD 30 MIN: CPT | Performed by: INTERNAL MEDICINE

## 2018-07-02 PROCEDURE — 80053 COMPREHEN METABOLIC PANEL: CPT | Performed by: INTERNAL MEDICINE

## 2018-07-02 PROCEDURE — 85025 COMPLETE CBC W/AUTO DIFF WBC: CPT | Performed by: INTERNAL MEDICINE

## 2018-07-02 PROCEDURE — 36415 COLL VENOUS BLD VENIPUNCTURE: CPT | Performed by: INTERNAL MEDICINE

## 2018-07-02 RX ORDER — DOXYCYCLINE 100 MG/1
100 TABLET ORAL 2 TIMES DAILY
Qty: 28 TABLET | Refills: 0 | Status: SHIPPED | OUTPATIENT
Start: 2018-07-02 | End: 2018-07-16

## 2018-07-02 ASSESSMENT — PAIN SCALES - GENERAL: PAINLEVEL: MILD PAIN (3)

## 2018-07-02 NOTE — TELEPHONE ENCOUNTER
Pt has fever 102 aches may have a couple spider bites  Seems like it started in both wrists that he hasn't had.    .Margie Chance on 7/2/2018 at 7:47 AM

## 2018-07-02 NOTE — TELEPHONE ENCOUNTER
Patient states he is experiencing nausea, temp of 102 and body aches since yesterday.  He is inquiring as to suggestions to do.  Please advise.    Lara Morin LPN............7/2/2018 8:27 AM

## 2018-07-02 NOTE — PROGRESS NOTES
"Chief Complaint   Patient presents with     Fever     sore throat, nausea, body aches, 101.2 temp          Subjective:   Mr. Tatum is a 62 year old male  seen for the acute concern today of fever, body aches for the last day.  He reports that he has not had significant other symptoms including changes in bowels, significant nausea or respiratory symptoms.  He has had some urinary frequency but no hematuria.  He was exposed to his granddaughter who had a cold last week and was treated for strep empirically.  His wife also had some fevers last week however had more gastrointestinal disease.  He has not had any recent travel.  He denies any known tick bites although is active on a regular basis.    He has been taking some Tylenol for the fevers.  He has a history of atrial fibrillation and is on sotalol for this.  His wife reports his heart rate has been slightly increased.  He denies any known drug allergies.    He  reports that he has never smoked. He has never used smokeless tobacco.    Past medical history reviewed as below:     Past Medical History:   Diagnosis Date     Atrial fibrillation (H)     10/31/2014     Calculus of kidney     passed spontaneous, age of 30     Enlarged prostate without lower urinary tract symptoms (luts)     9/26/2013     Preglaucoma     No Comments Provided   .      ROS:   Pertinent  ROS was performed and was negative, including for chest pain, shortness of breath, increased lower extremity edema, changes in bowel, blood in the stool, difficulty swallowing, sores in the mouth. No other concerns, with exception of HPI above.      Objective:    /56 (BP Location: Right arm, Patient Position: Sitting, Cuff Size: Adult Large)  Pulse 68  Temp 101.2  F (38.4  C) (Tympanic)  Resp 20  Ht 6' 3.5\" (1.918 m)  Wt 196 lb 3 oz (89 kg)  BMI 24.2 kg/m2  GEN: Vitals reviewed.  Patient is in no acute distress. Cooperative with exam.  HEENT: Normocephalic atraumatic.  Pupils equally round.  No " scleral icterus, no conjunctival erythema. Oropharynx with no erythema or exudates. Dentition adequate.  NECK: Supple; no thyromegaly.  No neck, cervical LAD.  Submandibular LAD not noted  CV: Heart regular in rate and rhythm with no murmur.   LUNGS: Lungs clear to auscultation bilaterally.  Chest rise equal bilaterally.  No accessory muscle use.  SKIN: Warm and dry to touch.  No rash on face, arms and legs.  Patient is notably diaphoretic.  EXT: No clubbing or cyanosis.  No peripheral edema.    LABS: 7/2/2018 - Personally ordered/reviewed  Results for orders placed or performed in visit on 07/02/18   *UA reflex to Microscopic   Result Value Ref Range    Color Urine Yellow     Appearance Urine Clear     Glucose Urine Negative NEG^Negative mg/dL    Bilirubin Urine Negative NEG^Negative    Ketones Urine Negative NEG^Negative mg/dL    Specific Gravity Urine 1.020 1.003 - 1.035    Blood Urine Negative NEG^Negative    pH Urine 5.5 5.0 - 7.0 pH    Protein Albumin Urine Negative NEG^Negative mg/dL    Urobilinogen Urine 0.2 0.2 - 1.0 EU/dL    Nitrite Urine Negative NEG^Negative    Leukocyte Esterase Urine Negative NEG^Negative    Source Midstream Urine    CBC and Differential   Result Value Ref Range    WBC 7.8 4.0 - 11.0 10e9/L    RBC Count 4.40 4.4 - 5.9 10e12/L    Hemoglobin 14.2 13.3 - 17.7 g/dL    Hematocrit 41.2 40.0 - 53.0 %    MCV 94 78 - 100 fl    MCH 32.3 26.5 - 33.0 pg    MCHC 34.5 31.5 - 36.5 g/dL    RDW 11.9 10.0 - 15.0 %    Platelet Count 141 (L) 150 - 450 10e9/L    Diff Method Automated Method     % Neutrophils 76.9 %    % Lymphocytes 7.5 %    % Monocytes 15.2 %    % Eosinophils 0.0 %    % Basophils 0.1 %    % Immature Granulocytes 0.3 %    Absolute Neutrophil 6.0 1.6 - 8.3 10e9/L    Absolute Lymphocytes 0.6 (L) 0.8 - 5.3 10e9/L    Absolute Monocytes 1.2 0.0 - 1.3 10e9/L    Absolute Eosinophils 0.0 0.0 - 0.7 10e9/L    Absolute Basophils 0.0 0.0 - 0.2 10e9/L    Abs Immature Granulocytes 0.0 0 - 0.4 10e9/L    Comprehensive metabolic panel   Result Value Ref Range    Sodium 137 134 - 144 mmol/L    Potassium 3.7 3.5 - 5.1 mmol/L    Chloride 102 98 - 107 mmol/L    Carbon Dioxide 29 21 - 31 mmol/L    Anion Gap 6 3 - 14 mmol/L    Glucose 130 (H) 70 - 105 mg/dL    Urea Nitrogen 14 7 - 25 mg/dL    Creatinine 1.27 0.70 - 1.30 mg/dL    GFR Estimate 57 (L) >60 mL/min/1.7m2    GFR Estimate If Black 70 >60 mL/min/1.7m2    Calcium 9.0 8.6 - 10.3 mg/dL    Bilirubin Total 0.6 0.3 - 1.0 mg/dL    Albumin 4.3 3.5 - 5.7 g/dL    Protein Total 6.6 6.4 - 8.9 g/dL    Alkaline Phosphatase 54 34 - 104 U/L    ALT 36 7 - 52 U/L    AST 35 13 - 39 U/L           Assessment/Plan:   Fever, unspecified fever cause  -  with fever and body aches along with mild decrease in lymphocytes and platelets in the setting of minimal other symptoms tickborne illnesses the most likely cause.  Patient is treated with 2 weeks of doxycycline.  He is to call if he has any worsening, lack of improvement or other concerns.  - doxycycline Monohydrate 100 MG TABS  Dispense: 28 tablet; Refill: 0  - CBC and Differential    Body aches  - See above  - CBC and Differential    Urinary frequency  -UA done today and is negative.  I suspect this frequency is secondary to illness and history of BPH  - *UA reflex to Microscopic    High risk medication use  -With a serious history of high risk medication use with sotalol and the fact that he has not had any labs checked in a period of time labs are checked to rule out any underlining metabolic disorder.  - Comprehensive metabolic panel      - Return/call as needed for follow-up should any new symptoms develop, for worsening of current symptoms or if symptoms do not resolve with above plan.        Patient Instructions   Your symptoms are most consistent with a bacterial infection.    Take your antibiotics as prescribed. Increase water intake.    Recommend eating yogurt/kefir or taking probiotics 1-2 times daily while on antibiotics      Call if symptoms do not improve in a couple days or if you develop any medication reactions.    General  - get extra rest  - drink plenty of fluid  - avoid tobacco products    You will need to be evaluated if you start to experience:   Fever higher than 102.5 F (39.2 C)   Worsening of current symptoms  Sudden and severe pain in the face and head or troubleseeing or thinking clearly   Swelling or redness around 1 or both eyes   Trouble breathing, chest pain or a stiff neck    * If you are a smoker, try to quit *         SINA KING DO   7/2/2018 6:32 PM    This document was prepared using voice generated softwear. While every attempt was made for accuracy, grammatical errors may exist.

## 2018-07-02 NOTE — TELEPHONE ENCOUNTER
Assisted patient in scheduling appointment for today at 1:00pm.    Lara Morin LPN............7/2/2018 11:29 AM

## 2018-07-02 NOTE — NURSING NOTE
Patient presents to clinic experiencing weakness in hands, fever of 101.2, sore throat, nausea and body aches since yesterday.  aLra Morin LPN............7/2/2018 1:04 PM

## 2018-07-02 NOTE — MR AVS SNAPSHOT
After Visit Summary   7/2/2018    Darron Tatum    MRN: [id number]           Patient Information     Date Of Birth          1955        Visit Information        Provider Department      7/2/2018 1:00 PM Alberta Fan DO [facility]        Today's Diagnoses     Fever, unspecified fever cause    -  1    Body aches        Urinary frequency        High risk medication use          Care Instructions    Your symptoms are most consistent with a bacterial infection.    Take your antibiotics as prescribed. Increase water intake.    Recommend eating yogurt/kefir or taking probiotics 1-2 times daily while on antibiotics     Call if symptoms do not improve in a couple days or if you develop any medication reactions.    General  - get extra rest  - drink plenty of fluid  - avoid tobacco products    You will need to be evaluated if you start to experience:   Fever higher than 102.5 F (39.2 C)   Worsening of current symptoms  Sudden and severe pain in the face and head or troubleseeing or thinking clearly   Swelling or redness around 1 or both eyes   Trouble breathing, chest pain or a stiff neck    * If you are a smoker, try to quit *            Follow-ups after your visit        Follow-up notes from your care team     Return if symptoms worsen or fail to improve.      Who to contact     If you have questions or need follow up information about today's clinic visit or your schedule please contact [facility] [place] [facility] AND [facility] directly at [phone].  Normal or non-critical lab and imaging results will be communicated to you by MyChart, letter or phone within 4 business days after the clinic has received the results. If you do not hear from us within 7 days, please contact the clinic through MyChart or phone. If you have a critical or abnormal lab result, we will notify you by phone as soon as possible.  Submit refill requests through [employer] or call your pharmacy and they will forward  "the refill request to us. Please allow 3 business days for your refill to be completed.          Additional Information About Your Visit        Care EveryWhere ID     This is your Care EveryWhere ID. This could be used by other organizations to access your [facility] medical records  [id number]        Your Vitals Were     Pulse Temperature Respirations Height BMI (Body Mass Index)       68 101.2  F (38.4  C) (Tympanic) 20 6' 3.5\" (1.918 m) 24.2 kg/m2        Blood Pressure from Last 3 Encounters:   07/02/18 126/56   11/09/17 118/76   07/31/17 118/80    Weight from Last 3 Encounters:   07/02/18 196 lb 3 oz (89 kg)   11/09/17 195 lb 4 oz (88.6 kg)   07/31/17 195 lb (88.5 kg)              We Performed the Following     *UA reflex to Microscopic     CBC and Differential     Comprehensive metabolic panel          Today's Medication Changes          These changes are accurate as of 7/2/18  1:29 PM.  If you have any questions, ask your nurse or doctor.               Start taking these medicines.        Dose/Directions    doxycycline Monohydrate 100 MG Tabs   Used for:  Fever, unspecified fever cause   Started by:  Alberta Fan DO        Dose:  100 mg   Take 100 mg by mouth 2 times daily for 14 days   Quantity:  28 tablet   Refills:  0            Where to get your medicines      These medications were sent to [facility] [address] [place] [address]     Phone:  [phone]     doxycycline Monohydrate 100 MG Tabs                Primary Care Provider Fax #    Physician No Ref-Primary [phone]       No address on file        Equal Access to Services     [facility]: Hadii maribel flores [place], waaxda luqadaha, qaybta kaalmada aderefugio, johan nicholson. So [facility] [phone].    ATENCIÓN: Si habla español, tiene a penny disposición servicios gratuitos de asistencia lingüística. Llame al [phone].    We comply with " applicable federal civil rights laws and [place] laws. We do not discriminate on the basis of race, color, national origin, age, disability, sex, sexual orientation, or gender identity.            Thank you!     Thank you for choosing [facility] AND [facility]  for your care. Our goal is always to provide you with excellent care. Hearing back from our patients is one way we can continue to improve our services. Please take a few minutes to complete the written survey that you may receive in the mail after your visit with us. Thank you!             Your Updated Medication List - Protect others around you: Learn how to safely use, store and throw away your medicines at www.disposemymeds.org.          This list is accurate as of 7/2/18  1:29 PM.  Always use your most recent med list.                   Brand Name Dispense Instructions for use Diagnosis    doxycycline Monohydrate 100 MG Tabs     28 tablet    Take 100 mg by mouth 2 times daily for 14 days    Fever, unspecified fever cause       sotalol 80 MG tablet    BETAPACE     Take 80 mg by mouth 2 times daily (before meals)        TRAVATAN Z 0.004 % ophthalmic solution   Generic drug:  travoprost (LONI Free)      1 drop At Bedtime

## 2018-07-02 NOTE — PATIENT INSTRUCTIONS
Your symptoms are most consistent with a bacterial infection.    Take your antibiotics as prescribed. Increase water intake.    Recommend eating yogurt/kefir or taking probiotics 1-2 times daily while on antibiotics     Call if symptoms do not improve in a couple days or if you develop any medication reactions.    General  - get extra rest  - drink plenty of fluid  - avoid tobacco products    You will need to be evaluated if you start to experience:   Fever higher than 102.5 F (39.2 C)   Worsening of current symptoms  Sudden and severe pain in the face and head or troubleseeing or thinking clearly   Swelling or redness around 1 or both eyes   Trouble breathing, chest pain or a stiff neck    * If you are a smoker, try to quit *

## 2018-07-03 ENCOUNTER — MYC MEDICAL ADVICE (OUTPATIENT)
Dept: INTERNAL MEDICINE | Facility: OTHER | Age: 63
End: 2018-07-03

## 2018-07-03 NOTE — TELEPHONE ENCOUNTER
Contacted the patient and let him know Alberta Fan DO is out of the office until 7-5-2018. I let him know to use good hand hygrine and normal precautions. Also let him know I would still forward the message.  Lolis Almonte LPN on 7/3/2018 at 3:17 PM

## 2018-11-01 ENCOUNTER — ALLIED HEALTH/NURSE VISIT (OUTPATIENT)
Dept: FAMILY MEDICINE | Facility: OTHER | Age: 63
End: 2018-11-01
Payer: COMMERCIAL

## 2018-11-01 DIAGNOSIS — Z23 NEED FOR PROPHYLACTIC VACCINATION AND INOCULATION AGAINST INFLUENZA: Primary | ICD-10-CM

## 2018-11-01 PROCEDURE — 90686 IIV4 VACC NO PRSV 0.5 ML IM: CPT | Performed by: FAMILY MEDICINE

## 2018-11-01 PROCEDURE — 90471 IMMUNIZATION ADMIN: CPT | Performed by: FAMILY MEDICINE

## 2018-11-01 NOTE — PROGRESS NOTES
Injectable Influenza Immunization Documentation    1.  Is the person to be vaccinated sick today?   No    2. Does the person to be vaccinated have an allergy to a component   of the vaccine?   No  Egg Allergy Algorithm Link    3. Has the person to be vaccinated ever had a serious reaction   to influenza vaccine in the past?   No    4. Has the person to be vaccinated ever had Guillain-Barré syndrome?   No    Form completed by Janet Damon CMA ([facility])......................11/1/2018  10:48 AM        Janet Damon CMA ([facility])......................11/1/2018  10:48 AM

## 2018-11-01 NOTE — MR AVS SNAPSHOT
After Visit Summary   11/1/2018    Darron Tatum    MRN: [id number]           Patient Information     Date Of Birth          1955        Visit Information        Provider Department      11/1/2018 11:45 AM Nurse, Jenn Campuzano [facility]        Today's Diagnoses     Need for prophylactic vaccination and inoculation against influenza    -  1       Follow-ups after your visit        Your next 10 appointments already scheduled     Nov 01, 2018 11:45 AM CDT   Nurse Only with [facility] Nurse   [facility]    [address]  [facility] [address] [phone]              Who to contact     If you have questions or need follow up information about today's clinic visit or your schedule please contact [facility] directly at [phone].  Normal or non-critical lab and imaging results will be communicated to you by [employer], letter or phone within 4 business days after the clinic has received the results. If you do not hear from us within 7 days, please contact the clinic through [employer] or phone. If you have a critical or abnormal lab result, we will notify you by phone as soon as possible.  Submit refill requests through [employer] or call your pharmacy and they will forward the refill request to us. Please allow 3 business days for your refill to be completed.          Additional Information About Your Visit        MyChart Information     [employer] gives you secure access to your electronic health record. If you see a primary care provider, you can also send messages to your care team and make appointments. If you have questions, please call your primary care clinic.  If you do not have a primary care provider, please call [phone] and they will assist you.        Care EveryWhere ID     This is your Care EveryWhere ID. This could be used by other organizations to access your [facility] medical records  [id number]         Blood Pressure  from Last 3 Encounters:   07/02/18 126/56   11/09/17 118/76   07/31/17 118/80    Weight from Last 3 Encounters:   07/02/18 196 lb 3 oz (89 kg)   11/09/17 195 lb 4 oz (88.6 kg)   07/31/17 195 lb (88.5 kg)              We Performed the Following     HC FLU VAC PRESRV FREE QUAD SPLIT VIR 3+YRS IM     Vaccine Administration, Initial [[id number]]        Primary Care Provider Fax #    Physician No Ref-Primary [phone]       No address on file        Equal Access to Services     [facility]: Hadii maribel Fernandes, wakrista cho, juan lomaxalkamryn wright, johan ryan . So [facility] [phone].    ATENCIÓN: Si habla español, tiene a penny disposición servicios gratuitos de asistencia lingüística. [facility] [phone].    We comply with applicable federal civil rights laws and [place] laws. We do not discriminate on the basis of race, color, national origin, age, disability, sex, sexual orientation, or gender identity.            Thank you!     Thank you for choosing [facility]  for your care. Our goal is always to provide you with excellent care. Hearing back from our patients is one way we can continue to improve our services. Please take a few minutes to complete the written survey that you may receive in the mail after your visit with us. Thank you!             Your Updated Medication List - Protect others around you: Learn how to safely use, store and throw away your medicines at www.disposemymeds.org.          This list is accurate as of 11/1/18 10:52 AM.  Always use your most recent med list.                   Brand Name Dispense Instructions for use Diagnosis    sotalol 80 MG tablet    BETAPACE     Take 80 mg by mouth 2 times daily (before meals)        TRAVATAN Z 0.004 % ophthalmic solution   Generic drug:  travoprost (LONI Free)      1 drop At Bedtime

## 2019-03-16 NOTE — PROGRESS NOTES
"Nursing Notes:   Tayla Flores LPN  3/18/2019 10:52 AM  Signed  Chief Complaint   Patient presents with     Physical     Pt present to clinic today for a physical.  Initial /82 (BP Location: Right arm, Patient Position: Sitting, Cuff Size: Adult Large)   Pulse 56   Temp 97.6  F (36.4  C) (Tympanic)   Ht 1.905 m (6' 3\")   Wt 90.6 kg (199 lb 12.8 oz)   BMI 24.97 kg/m    Estimated body mass index is 24.97 kg/m  as calculated from the following:    Height as of this encounter: 1.905 m (6' 3\").    Weight as of this encounter: 90.6 kg (199 lb 12.8 oz).  Medication Reconciliation: delmy Flores LPN  Nursing note reviewed with patient.  Accuracy and completeness verified.   Mr. Tatum is a 63 year old male who:  Patient presents with:  Physical      ICD-10-CM    1. Annual physical exam Z00.00    2. Paroxysmal atrial fibrillation (H) I48.0 sotalol (BETAPACE) 80 MG tablet   3. Benign prostatic hyperplasia, unspecified whether lower urinary tract symptoms present N40.0 *UA reflex to Microscopic   4. Elevated random blood glucose level R73.09 Comprehensive metabolic panel     Hemoglobin A1c   5. Thrombocytopenia (H) D69.6 CBC with platelets   6. Mixed hyperlipidemia E78.2 Lipid Profile   7. Skin lesion of face L98.9 DERMATOLOGY REFERRAL     HPI  Patient presents for annual physical examination.  He is new to my clinic.  His previous provider retired.    Paroxysmal atrial fibrillation, doing well with sotalol.  No recent episodes.  Not currently taking any aspirin.  Was following with cardiology down in [place].  Would like to get his meds refilled here now.  --Discussed consideration of starting low-dose aspirin daily.  He has not been doing anything recently.  Elevated BNP 1/17/2017 -- was in Atrial fibrillation. ECHO was okay.   Was on blood thinner for 3 weeks, then did cardioversion and now off Warfarin.     BPH, reports urinary health is stable.  Last PSA level dropped in value.  Down to 1.2 " range.  We will recheck PSA in 1 or 2 years pending symptoms.    Last labs showed elevated random glucose, check hemoglobin A1c.    Previous labs showed thrombocytopenia, check CBC.    Mixed hyperlipidemia, check lipid panel.  LDL cholesterol was 100 previously.    Facial skin lesions, left cheek and right ear.  He would like to see dermatology.  Spent some time outdoors fishing.  Reports brother has history of some type of melanoma.  He is worried about skin cancer risk.    Health screenings are up-to-date.    Functional Capacity: > 4 METS.   Review of Systems   Constitutional: Negative for chills and fever.   HENT: Negative for congestion and hearing loss.    Eyes: Negative for visual disturbance.   Respiratory: Negative for cough, shortness of breath and wheezing.    Cardiovascular: Negative for chest pain and palpitations.   Gastrointestinal: Negative for abdominal pain, diarrhea, nausea and vomiting.   Endocrine: Negative for cold intolerance and heat intolerance.   Genitourinary: Negative for dysuria and hematuria.   Musculoskeletal: Negative for arthralgias and myalgias.   Skin: Positive for rash. Negative for wound.        + left face lesion and right ear reddened rash that isn't healing.    Allergic/Immunologic: Negative for immunocompromised state.   Neurological: Negative for dizziness and light-headedness.   Hematological: Does not bruise/bleed easily.   Psychiatric/Behavioral: Negative for agitation and confusion.      ALEXI:   No flowsheet data found.  PHQ9:  PHQ-9 SCORE 11/18/2015   PHQ-9 Total Score 2       I have personally reviewed the past medical history, past surgical history, medications, allergies, family and social history as listed below.     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     Priority: Medium     Personal history of urinary calculi 01/24/2018     Priority: Medium     Paroxysmal atrial fibrillation (H) - hx of cardioversion 11/09/2017     Priority: Medium     Onychomycosis 12/14/2015     Priority: Medium     Actinic keratosis of left cheek 09/26/2013     Priority: Medium     Benign prostatic hyperplasia 09/26/2013     Priority: Medium     Past Medical History:   Diagnosis Date     Atrial fibrillation (H)     10/31/2014     Calculus of kidney     passed spontaneous, age of 30     Enlarged prostate without lower urinary tract symptoms (luts)     9/26/2013     Preglaucoma     No Comments Provided     Past Surgical History:   Procedure Laterality Date     COLONOSCOPY  04/30/2012 2007, 2012,Follow up 2017, history of polyps     FINGER SURGERY      repair, left long finger injury     OTHER SURGICAL HISTORY      1998,,HERNIA REPAIR,Right, repair with mesh     OTHER SURGICAL HISTORY      20s,205329,REPAIR RETINAL DETACH,Retinal hole     OTHER SURGICAL HISTORY      02/2017,207069,CARDIOVERSION,Atrial Fibrillation     Social History     Socioeconomic History     Marital status:      Spouse name: None     Number of children: None     Years of education: None     Highest education level: None   Occupational History     None   Social Needs     Financial resource strain: None     Food insecurity:     Worry: None     Inability: None     Transportation needs:     Medical: None     Non-medical: None   Tobacco Use     Smoking status: Never Smoker     Smokeless tobacco: Never Used   Substance and Sexual Activity     Alcohol use: Yes     Alcohol/week: 2.5 oz     Comment: Alcoholic Drinks/day: glass of Wine, 5-6 times a week     Drug use: No     Comment: Drug use:  "No     Sexual activity: Yes     Partners: Female   Lifestyle     Physical activity:     Days per week: None     Minutes per session: None     Stress: None   Relationships     Social connections:     Talks on phone: None     Gets together: None     Attends Baptism service: None     Active member of club or organization: None     Attends meetings of clubs or organizations: None     Relationship status: None     Intimate partner violence:     Fear of current or ex partner: None     Emotionally abused: None     Physically abused: None     Forced sexual activity: None   Other Topics Concern     None   Social History Narrative     at the age of 41 and has two grown stepsons. Retired.  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  Wife teaches [employer] lessons.     Family History   Problem Relation Age of Onset     Cancer Mother         Cancer,brain tumor     Hypertension Mother         Hypertension     Genitourinary Problems Father         Genitourinary Disease,BPH     Other - See Comments Father         peptic ulcer disease     Other - See Comments Brother         detached retina     Genitourinary Problems Brother         Genitourinary Disease,BPH     Skin Cancer Brother         Skin cancer     Other - See Comments Brother         detached retina     Heart Disease Brother         Heart Disease,atrial fibrillation/flutter with TIA's     Other - See Comments Sister         osteopenia       EXAM:   Vitals:    03/18/19 1019   BP: 136/82   BP Location: Right arm   Patient Position: Sitting   Cuff Size: Adult Large   Pulse: 56   Temp: 97.6  F (36.4  C)   TempSrc: Tympanic   Weight: 90.6 kg (199 lb 12.8 oz)   Height: 1.905 m (6' 3\")       Current Pain Score: No Pain (0)     BP Readings from Last 3 Encounters:   03/18/19 136/82   07/02/18 126/56   11/09/17 118/76      Wt Readings from Last 3 Encounters:   03/18/19 90.6 kg (199 lb 12.8 oz)   07/02/18 89 kg (196 lb 3 oz)   11/09/17 88.6 kg (195 lb 4 oz)    " "  Estimated body mass index is 24.97 kg/m  as calculated from the following:    Height as of this encounter: 1.905 m (6' 3\").    Weight as of this encounter: 90.6 kg (199 lb 12.8 oz).     Physical Exam   Constitutional: He appears well-developed and well-nourished. No distress.   HENT:   Head: Normocephalic and atraumatic.   Eyes: Conjunctivae and EOM are normal. No scleral icterus.   Neck: No thyromegaly present.   Cardiovascular: Normal rate and regular rhythm.   Pulmonary/Chest: Effort normal. No respiratory distress. He has no wheezes.   Abdominal: Soft. There is no tenderness.   Musculoskeletal: He exhibits no tenderness or deformity.   Lymphadenopathy:     He has no cervical adenopathy.   Neurological: He is alert. No cranial nerve deficit.   Skin: Skin is warm and dry. Rash (reddened right ear rash and brown macule on left pre-auricular area) noted.   Psychiatric: He has a normal mood and affect.        Procedures   INVESTIGATIONS:  Results for orders placed or performed in visit on 07/02/18   *UA reflex to Microscopic   Result Value Ref Range    Color Urine Yellow     Appearance Urine Clear     Glucose Urine Negative NEG^Negative mg/dL    Bilirubin Urine Negative NEG^Negative    Ketones Urine Negative NEG^Negative mg/dL    Specific Gravity Urine 1.020 1.003 - 1.035    Blood Urine Negative NEG^Negative    pH Urine 5.5 5.0 - 7.0 pH    Protein Albumin Urine Negative NEG^Negative mg/dL    Urobilinogen Urine 0.2 0.2 - 1.0 EU/dL    Nitrite Urine Negative NEG^Negative    Leukocyte Esterase Urine Negative NEG^Negative    Source Midstream Urine    CBC and Differential   Result Value Ref Range    WBC 7.8 4.0 - 11.0 10e9/L    RBC Count 4.40 4.4 - 5.9 10e12/L    Hemoglobin 14.2 13.3 - 17.7 g/dL    Hematocrit 41.2 40.0 - 53.0 %    MCV 94 78 - 100 fl    MCH 32.3 26.5 - 33.0 pg    MCHC 34.5 31.5 - 36.5 g/dL    RDW 11.9 10.0 - 15.0 %    Platelet Count 141 (L) 150 - 450 10e9/L    Diff Method Automated Method     % " Neutrophils 76.9 %    % Lymphocytes 7.5 %    % Monocytes 15.2 %    % Eosinophils 0.0 %    % Basophils 0.1 %    % Immature Granulocytes 0.3 %    Absolute Neutrophil 6.0 1.6 - 8.3 10e9/L    Absolute Lymphocytes 0.6 (L) 0.8 - 5.3 10e9/L    Absolute Monocytes 1.2 0.0 - 1.3 10e9/L    Absolute Eosinophils 0.0 0.0 - 0.7 10e9/L    Absolute Basophils 0.0 0.0 - 0.2 10e9/L    Abs Immature Granulocytes 0.0 0 - 0.4 10e9/L   Comprehensive metabolic panel   Result Value Ref Range    Sodium 137 134 - 144 mmol/L    Potassium 3.7 3.5 - 5.1 mmol/L    Chloride 102 98 - 107 mmol/L    Carbon Dioxide 29 21 - 31 mmol/L    Anion Gap 6 3 - 14 mmol/L    Glucose 130 (H) 70 - 105 mg/dL    Urea Nitrogen 14 7 - 25 mg/dL    Creatinine 1.27 0.70 - 1.30 mg/dL    GFR Estimate 57 (L) >60 mL/min/1.7m2    GFR Estimate If Black 70 >60 mL/min/1.7m2    Calcium 9.0 8.6 - 10.3 mg/dL    Bilirubin Total 0.6 0.3 - 1.0 mg/dL    Albumin 4.3 3.5 - 5.7 g/dL    Protein Total 6.6 6.4 - 8.9 g/dL    Alkaline Phosphatase 54 34 - 104 U/L    ALT 36 7 - 52 U/L    AST 35 13 - 39 U/L       ASSESSMENT AND PLAN:  Problem List Items Addressed This Visit        Endocrine    Mixed hyperlipidemia    Relevant Orders    Lipid Profile       Circulatory    Paroxysmal atrial fibrillation (H) - hx of cardioversion    Relevant Medications    sotalol (BETAPACE) 80 MG tablet       Urinary    Benign prostatic hyperplasia    Relevant Orders    *UA reflex to Microscopic       Hematologic    Thrombocytopenia (H)    Relevant Orders    CBC with platelets      Other Visit Diagnoses     Annual physical exam    -  Primary    Elevated random blood glucose level        Relevant Orders    Comprehensive metabolic panel    Hemoglobin A1c    Skin lesion of face        Relevant Orders    DERMATOLOGY REFERRAL        reviewed diet, exercise and weight control, recommended sodium restriction, cardiovascular risk and specific lipid/LDL goals reviewed  -- Expected clinical course discussed    -- Medications  and their side effects discussed    Patient Instructions   Medication refilled.     Labs today.     -- Consider Aspirin 81 mg daily -- for stroke risk reduction.       To help with weight loss and improve blood sugar control....    -- Try to avoid Carbohydrates as much as possible -- breads, pasta, baked goods, cakes, oatmeal, cold cereal, potatoes.   These are turned to sugar in one metabolic conversion, cause insulin secretion and increased fat deposition / weight gain.      -- Eat more lean meats, proteins, eggs, nuts, vegetables.         Blood pressure checks at home - check some in AM, some in Afternoon, some in Evening and record   -- bring these with you to your next appointment.     Goal blood pressures -- less than 140 and less than 90.    -- Ideally would like the numbers about 110-130 and 70-80.  -- If running higher or lower than this on regular basis, will need to adjust your medications.        -- Try a Super-B-Complex with B12 -- every other day -- to help energy / mood and balance.     -- Consider Under the tongue / Liquid. ([facility])      Return in approximately 1 year, or sooner as needed for follow-up with Dr. Jin.  - Annual Follow-up / Physical    Clinic : [phone]  Appointment line: [phone]      Jacob Jin MD  Internal Medicine  [facility]     Portions of this note were dictated using speech recognition software. The note has been proofread but errors in the text may have been overlooked. Please contact me if there are any concerns regarding the accuracy of the dictation.

## 2019-03-18 ENCOUNTER — OFFICE VISIT (OUTPATIENT)
Dept: INTERNAL MEDICINE | Facility: OTHER | Age: 64
End: 2019-03-18
Attending: INTERNAL MEDICINE
Payer: COMMERCIAL

## 2019-03-18 VITALS
DIASTOLIC BLOOD PRESSURE: 82 MMHG | HEIGHT: 75 IN | SYSTOLIC BLOOD PRESSURE: 136 MMHG | HEART RATE: 56 BPM | TEMPERATURE: 97.6 F | WEIGHT: 199.8 LBS | BODY MASS INDEX: 24.84 KG/M2

## 2019-03-18 DIAGNOSIS — R73.9 ELEVATED RANDOM BLOOD GLUCOSE LEVEL: ICD-10-CM

## 2019-03-18 DIAGNOSIS — N40.0 BENIGN PROSTATIC HYPERPLASIA, UNSPECIFIED WHETHER LOWER URINARY TRACT SYMPTOMS PRESENT: ICD-10-CM

## 2019-03-18 DIAGNOSIS — D69.6 THROMBOCYTOPENIA (H): ICD-10-CM

## 2019-03-18 DIAGNOSIS — E78.2 MIXED HYPERLIPIDEMIA: ICD-10-CM

## 2019-03-18 DIAGNOSIS — I48.0 PAROXYSMAL ATRIAL FIBRILLATION (H): ICD-10-CM

## 2019-03-18 DIAGNOSIS — L98.9 SKIN LESION OF FACE: ICD-10-CM

## 2019-03-18 DIAGNOSIS — Z00.00 ANNUAL PHYSICAL EXAM: Primary | ICD-10-CM

## 2019-03-18 LAB
ALBUMIN SERPL-MCNC: 4.4 G/DL (ref 3.5–5.7)
ALBUMIN UR-MCNC: NEGATIVE MG/DL
ALP SERPL-CCNC: 55 U/L (ref 34–104)
ALT SERPL W P-5'-P-CCNC: 14 U/L (ref 7–52)
ANION GAP SERPL CALCULATED.3IONS-SCNC: 4 MMOL/L (ref 3–14)
APPEARANCE UR: CLEAR
AST SERPL W P-5'-P-CCNC: 18 U/L (ref 13–39)
BILIRUB SERPL-MCNC: 0.6 MG/DL (ref 0.3–1)
BILIRUB UR QL STRIP: NEGATIVE
BUN SERPL-MCNC: 17 MG/DL (ref 7–25)
CALCIUM SERPL-MCNC: 9.6 MG/DL (ref 8.6–10.3)
CHLORIDE SERPL-SCNC: 106 MMOL/L (ref 98–107)
CHOLEST SERPL-MCNC: 159 MG/DL
CO2 SERPL-SCNC: 30 MMOL/L (ref 21–31)
COLOR UR AUTO: YELLOW
CREAT SERPL-MCNC: 1.26 MG/DL (ref 0.7–1.3)
ERYTHROCYTE [DISTWIDTH] IN BLOOD BY AUTOMATED COUNT: 11.9 % (ref 10–15)
GFR SERPL CREATININE-BSD FRML MDRD: 58 ML/MIN/{1.73_M2}
GLUCOSE SERPL-MCNC: 102 MG/DL (ref 70–105)
GLUCOSE UR STRIP-MCNC: NEGATIVE MG/DL
HBA1C MFR BLD: 5.3 % (ref 4–6)
HCT VFR BLD AUTO: 47.4 % (ref 40–53)
HDLC SERPL-MCNC: 42 MG/DL (ref 23–92)
HGB BLD-MCNC: 15.1 G/DL (ref 13.3–17.7)
HGB UR QL STRIP: NEGATIVE
KETONES UR STRIP-MCNC: NEGATIVE MG/DL
LDLC SERPL CALC-MCNC: 100 MG/DL
LEUKOCYTE ESTERASE UR QL STRIP: NEGATIVE
MCH RBC QN AUTO: 31.8 PG (ref 26.5–33)
MCHC RBC AUTO-ENTMCNC: 31.9 G/DL (ref 31.5–36.5)
MCV RBC AUTO: 100 FL (ref 78–100)
NITRATE UR QL: NEGATIVE
NONHDLC SERPL-MCNC: 117 MG/DL
PH UR STRIP: 6 PH (ref 5–9)
PLATELET # BLD AUTO: 174 10E9/L (ref 150–450)
POTASSIUM SERPL-SCNC: 4.4 MMOL/L (ref 3.5–5.1)
PROT SERPL-MCNC: 7.7 G/DL (ref 6.4–8.9)
RBC # BLD AUTO: 4.75 10E12/L (ref 4.4–5.9)
SODIUM SERPL-SCNC: 140 MMOL/L (ref 134–144)
SOURCE: NORMAL
SP GR UR STRIP: 1.01 (ref 1–1.03)
TRIGL SERPL-MCNC: 87 MG/DL
UROBILINOGEN UR STRIP-ACNC: 0.2 EU/DL (ref 0.2–1)
WBC # BLD AUTO: 6.4 10E9/L (ref 4–11)

## 2019-03-18 PROCEDURE — 80061 LIPID PANEL: CPT | Performed by: INTERNAL MEDICINE

## 2019-03-18 PROCEDURE — 85027 COMPLETE CBC AUTOMATED: CPT | Performed by: INTERNAL MEDICINE

## 2019-03-18 PROCEDURE — 80053 COMPREHEN METABOLIC PANEL: CPT | Performed by: INTERNAL MEDICINE

## 2019-03-18 PROCEDURE — 36415 COLL VENOUS BLD VENIPUNCTURE: CPT | Performed by: INTERNAL MEDICINE

## 2019-03-18 PROCEDURE — 81003 URINALYSIS AUTO W/O SCOPE: CPT | Performed by: INTERNAL MEDICINE

## 2019-03-18 PROCEDURE — 99396 PREV VISIT EST AGE 40-64: CPT | Performed by: INTERNAL MEDICINE

## 2019-03-18 PROCEDURE — 83036 HEMOGLOBIN GLYCOSYLATED A1C: CPT | Performed by: INTERNAL MEDICINE

## 2019-03-18 RX ORDER — SOTALOL HYDROCHLORIDE 80 MG/1
80 TABLET ORAL
Qty: 180 TABLET | Refills: 3 | Status: SHIPPED | OUTPATIENT
Start: 2019-03-18 | End: 2020-08-07

## 2019-03-18 ASSESSMENT — ENCOUNTER SYMPTOMS
ABDOMINAL PAIN: 0
PALPITATIONS: 0
WHEEZING: 0
DYSURIA: 0
MYALGIAS: 0
WOUND: 0
DIZZINESS: 0
LIGHT-HEADEDNESS: 0
ARTHRALGIAS: 0
CHILLS: 0
NAUSEA: 0
FEVER: 0
HEMATURIA: 0
COUGH: 0
VOMITING: 0
CONFUSION: 0
AGITATION: 0
DIARRHEA: 0
SHORTNESS OF BREATH: 0
BRUISES/BLEEDS EASILY: 0

## 2019-03-18 ASSESSMENT — PAIN SCALES - GENERAL: PAINLEVEL: NO PAIN (0)

## 2019-03-18 ASSESSMENT — MIFFLIN-ST. JEOR: SCORE: 1786.92

## 2019-03-18 NOTE — NURSING NOTE
"Chief Complaint   Patient presents with     Physical     Pt present to clinic today for a physical.  Initial /82 (BP Location: Right arm, Patient Position: Sitting, Cuff Size: Adult Large)   Pulse 56   Temp 97.6  F (36.4  C) (Tympanic)   Ht 1.905 m (6' 3\")   Wt 90.6 kg (199 lb 12.8 oz)   BMI 24.97 kg/m   Estimated body mass index is 24.97 kg/m  as calculated from the following:    Height as of this encounter: 1.905 m (6' 3\").    Weight as of this encounter: 90.6 kg (199 lb 12.8 oz).  Medication Reconciliation: complete    Tayla Flores LPN  "

## 2019-03-18 NOTE — PATIENT INSTRUCTIONS
Medication refilled.     Labs today.     -- Consider Aspirin 81 mg daily -- for stroke risk reduction.       To help with weight loss and improve blood sugar control....    -- Try to avoid Carbohydrates as much as possible -- breads, pasta, baked goods, cakes, oatmeal, cold cereal, potatoes.   These are turned to sugar in one metabolic conversion, cause insulin secretion and increased fat deposition / weight gain.      -- Eat more lean meats, proteins, eggs, nuts, vegetables.         Blood pressure checks at home - check some in AM, some in Afternoon, some in Evening and record   -- bring these with you to your next appointment.     Goal blood pressures -- less than 140 and less than 90.    -- Ideally would like the numbers about 110-130 and 70-80.  -- If running higher or lower than this on regular basis, will need to adjust your medications.        -- Try a Super-B-Complex with B12 -- every other day -- to help energy / mood and balance.     -- Consider Under the tongue / Liquid. ([facility])      Return in approximately 1 year, or sooner as needed for follow-up with Dr. Jin.  - Annual Follow-up / Physical    Clinic : [phone]  Appointment line: [phone]

## 2019-03-18 NOTE — LETTER
March 18, 2019    Darron Tatum  [address] [place] [address]      Dear Darron Tatum,    The result of your recent tests are included below:    Labs look good.  Continue current medications.    Results for orders placed or performed in visit on 03/18/19   Lipid Profile   Result Value Ref Range    Cholesterol 159 <200 mg/dL    Triglycerides 87 <150 mg/dL    HDL Cholesterol 42 23 - 92 mg/dL    LDL Cholesterol Calculated 100 (H) <100 mg/dL    Non HDL Cholesterol 117 <130 mg/dL   Hemoglobin A1c   Result Value Ref Range    Hemoglobin A1C 5.3 4.0 - 6.0 %   CBC with platelets   Result Value Ref Range    WBC 6.4 4.0 - 11.0 10e9/L    RBC Count 4.75 4.4 - 5.9 10e12/L    Hemoglobin 15.1 13.3 - 17.7 g/dL    Hematocrit 47.4 40.0 - 53.0 %     78 - 100 fl    MCH 31.8 26.5 - 33.0 pg    MCHC 31.9 31.5 - 36.5 g/dL    RDW 11.9 10.0 - 15.0 %    Platelet Count 174 150 - 450 10e9/L   Comprehensive metabolic panel   Result Value Ref Range    Sodium 140 134 - 144 mmol/L    Potassium 4.4 3.5 - 5.1 mmol/L    Chloride 106 98 - 107 mmol/L    Carbon Dioxide 30 21 - 31 mmol/L    Anion Gap 4 3 - 14 mmol/L    Glucose 102 70 - 105 mg/dL    Urea Nitrogen 17 7 - 25 mg/dL    Creatinine 1.26 0.70 - 1.30 mg/dL    GFR Estimate 58 (L) >60 mL/min/[1.73_m2]    GFR Estimate If Black 70 >60 mL/min/[1.73_m2]    Calcium 9.6 8.6 - 10.3 mg/dL    Bilirubin Total 0.6 0.3 - 1.0 mg/dL    Albumin 4.4 3.5 - 5.7 g/dL    Protein Total 7.7 6.4 - 8.9 g/dL    Alkaline Phosphatase 55 34 - 104 U/L    ALT 14 7 - 52 U/L    AST 18 13 - 39 U/L   *UA reflex to Microscopic   Result Value Ref Range    Color Urine Yellow     Appearance Urine Clear     Glucose Urine Negative NEG^Negative mg/dL    Bilirubin Urine Negative NEG^Negative    Ketones Urine Negative NEG^Negative mg/dL    Specific Gravity Urine 1.015 1.000 - 1.030    Blood Urine Negative NEG^Negative    pH Urine 6.0 5.0 - 9.0 pH    Protein Albumin Urine Negative NEG^Negative mg/dL    Urobilinogen  Urine 0.2 0.2 - 1.0 EU/dL    Nitrite Urine Negative NEG^Negative    Leukocyte Esterase Urine Negative NEG^Negative    Source Midstream Urine        If you have any further questions or problems, please contact my office at [phone] and schedule an appointment.    Clinic : [phone]  Appointment line: [phone]     Thank you,    Jacob Jin MD    Internal Medicine  [facility]     Reviewed and electronically signed by provider.

## 2019-05-27 ENCOUNTER — MYC REFILL (OUTPATIENT)
Dept: INTERNAL MEDICINE | Facility: OTHER | Age: 64
End: 2019-05-27

## 2019-05-27 DIAGNOSIS — I48.0 PAROXYSMAL ATRIAL FIBRILLATION (H): ICD-10-CM

## 2019-05-27 RX ORDER — SOTALOL HYDROCHLORIDE 80 MG/1
80 TABLET ORAL
Qty: 180 TABLET | Refills: 3 | Status: CANCELLED | OUTPATIENT
Start: 2019-05-27

## 2019-05-28 NOTE — TELEPHONE ENCOUNTER
[facility] pharmacist confirms they did receive the new prescription of Sotalol in March and is currently filling this at this time.      Blanca Coates LPN 5/28/2019 11:10 AM

## 2019-10-05 ENCOUNTER — HOSPITAL ENCOUNTER (EMERGENCY)
Facility: OTHER | Age: 64
Discharge: HOME | End: 2019-10-05
Attending: INTERNAL MEDICINE | Admitting: INTERNAL MEDICINE
Payer: COMMERCIAL

## 2019-10-05 VITALS
SYSTOLIC BLOOD PRESSURE: 136 MMHG | RESPIRATION RATE: 15 BRPM | BODY MASS INDEX: 24.62 KG/M2 | HEIGHT: 75 IN | OXYGEN SATURATION: 97 % | TEMPERATURE: 97.4 F | WEIGHT: 198 LBS | DIASTOLIC BLOOD PRESSURE: 83 MMHG | HEART RATE: 77 BPM

## 2019-10-05 DIAGNOSIS — I48.92 ATRIAL FLUTTER WITH RAPID VENTRICULAR RESPONSE (H): ICD-10-CM

## 2019-10-05 LAB
ALBUMIN SERPL-MCNC: 4.4 G/DL (ref 3.5–5.7)
ALP SERPL-CCNC: 56 U/L (ref 34–104)
ALT SERPL W P-5'-P-CCNC: 16 U/L (ref 7–52)
ANION GAP SERPL CALCULATED.3IONS-SCNC: 7 MMOL/L (ref 3–14)
AST SERPL W P-5'-P-CCNC: 20 U/L (ref 13–39)
BASOPHILS # BLD AUTO: 0.1 10E9/L (ref 0–0.2)
BASOPHILS NFR BLD AUTO: 0.9 %
BILIRUB SERPL-MCNC: 0.5 MG/DL (ref 0.3–1)
BUN SERPL-MCNC: 20 MG/DL (ref 7–25)
CALCIUM SERPL-MCNC: 9.4 MG/DL (ref 8.6–10.3)
CHLORIDE SERPL-SCNC: 103 MMOL/L (ref 98–107)
CO2 SERPL-SCNC: 28 MMOL/L (ref 21–31)
CREAT SERPL-MCNC: 1.31 MG/DL (ref 0.7–1.3)
DIFFERENTIAL METHOD BLD: NORMAL
EOSINOPHIL # BLD AUTO: 0.1 10E9/L (ref 0–0.7)
EOSINOPHIL NFR BLD AUTO: 1.7 %
ERYTHROCYTE [DISTWIDTH] IN BLOOD BY AUTOMATED COUNT: 11.9 % (ref 10–15)
GFR SERPL CREATININE-BSD FRML MDRD: 55 ML/MIN/{1.73_M2}
GLUCOSE SERPL-MCNC: 121 MG/DL (ref 70–105)
HCT VFR BLD AUTO: 45.7 % (ref 40–53)
HGB BLD-MCNC: 15.4 G/DL (ref 13.3–17.7)
IMM GRANULOCYTES # BLD: 0 10E9/L (ref 0–0.4)
IMM GRANULOCYTES NFR BLD: 0.4 %
LYMPHOCYTES # BLD AUTO: 2.5 10E9/L (ref 0.8–5.3)
LYMPHOCYTES NFR BLD AUTO: 30.3 %
MCH RBC QN AUTO: 31.8 PG (ref 26.5–33)
MCHC RBC AUTO-ENTMCNC: 33.7 G/DL (ref 31.5–36.5)
MCV RBC AUTO: 94 FL (ref 78–100)
MONOCYTES # BLD AUTO: 0.8 10E9/L (ref 0–1.3)
MONOCYTES NFR BLD AUTO: 9.4 %
NEUTROPHILS # BLD AUTO: 4.7 10E9/L (ref 1.6–8.3)
NEUTROPHILS NFR BLD AUTO: 57.3 %
PLATELET # BLD AUTO: 198 10E9/L (ref 150–450)
POTASSIUM SERPL-SCNC: 3.9 MMOL/L (ref 3.5–5.1)
PROT SERPL-MCNC: 7.3 G/DL (ref 6.4–8.9)
RBC # BLD AUTO: 4.85 10E12/L (ref 4.4–5.9)
SODIUM SERPL-SCNC: 138 MMOL/L (ref 134–144)
TROPONIN I SERPL-MCNC: 6 PG/ML
TSH SERPL DL<=0.05 MIU/L-ACNC: 4.37 IU/ML (ref 0.34–5.6)
WBC # BLD AUTO: 8.2 10E9/L (ref 4–11)

## 2019-10-05 PROCEDURE — 84443 ASSAY THYROID STIM HORMONE: CPT | Performed by: INTERNAL MEDICINE

## 2019-10-05 PROCEDURE — 93010 ELECTROCARDIOGRAM REPORT: CPT | Performed by: INTERNAL MEDICINE

## 2019-10-05 PROCEDURE — 96374 THER/PROPH/DIAG INJ IV PUSH: CPT | Performed by: INTERNAL MEDICINE

## 2019-10-05 PROCEDURE — 25800030 ZZH RX IP 258 OP 636: Performed by: INTERNAL MEDICINE

## 2019-10-05 PROCEDURE — 25000125 ZZHC RX 250: Performed by: INTERNAL MEDICINE

## 2019-10-05 PROCEDURE — 99284 EMERGENCY DEPT VISIT MOD MDM: CPT | Mod: 25 | Performed by: INTERNAL MEDICINE

## 2019-10-05 PROCEDURE — 99284 EMERGENCY DEPT VISIT MOD MDM: CPT | Mod: Z6 | Performed by: INTERNAL MEDICINE

## 2019-10-05 PROCEDURE — 25000132 ZZH RX MED GY IP 250 OP 250 PS 637: Performed by: INTERNAL MEDICINE

## 2019-10-05 PROCEDURE — 96375 TX/PRO/DX INJ NEW DRUG ADDON: CPT | Performed by: INTERNAL MEDICINE

## 2019-10-05 PROCEDURE — 85025 COMPLETE CBC W/AUTO DIFF WBC: CPT | Performed by: INTERNAL MEDICINE

## 2019-10-05 PROCEDURE — 80053 COMPREHEN METABOLIC PANEL: CPT | Performed by: INTERNAL MEDICINE

## 2019-10-05 PROCEDURE — 84484 ASSAY OF TROPONIN QUANT: CPT | Performed by: INTERNAL MEDICINE

## 2019-10-05 PROCEDURE — 36415 COLL VENOUS BLD VENIPUNCTURE: CPT | Performed by: INTERNAL MEDICINE

## 2019-10-05 PROCEDURE — 93005 ELECTROCARDIOGRAM TRACING: CPT | Performed by: INTERNAL MEDICINE

## 2019-10-05 RX ORDER — DILTIAZEM HYDROCHLORIDE 100 MG/1
10 INJECTION, POWDER, LYOPHILIZED, FOR SOLUTION INTRAVENOUS CONTINUOUS
Status: DISCONTINUED | OUTPATIENT
Start: 2019-10-05 | End: 2019-10-05 | Stop reason: HOSPADM

## 2019-10-05 RX ORDER — DILTIAZEM HYDROCHLORIDE 5 MG/ML
10 INJECTION INTRAVENOUS
Status: COMPLETED | OUTPATIENT
Start: 2019-10-05 | End: 2019-10-05

## 2019-10-05 RX ORDER — DILTIAZEM HYDROCHLORIDE 30 MG/1
15-30 TABLET, FILM COATED ORAL 4 TIMES DAILY
Qty: 60 TABLET | Refills: 0 | Status: SHIPPED | OUTPATIENT
Start: 2019-10-05 | End: 2020-08-07

## 2019-10-05 RX ORDER — DILTIAZEM HYDROCHLORIDE 5 MG/ML
10 INJECTION INTRAVENOUS ONCE
Status: COMPLETED | OUTPATIENT
Start: 2019-10-05 | End: 2019-10-05

## 2019-10-05 RX ORDER — SODIUM CHLORIDE 9 MG/ML
INJECTION, SOLUTION INTRAVENOUS CONTINUOUS
Status: DISCONTINUED | OUTPATIENT
Start: 2019-10-05 | End: 2019-10-05 | Stop reason: HOSPADM

## 2019-10-05 RX ORDER — SOTALOL HYDROCHLORIDE 80 MG/1
80 TABLET ORAL ONCE
Status: COMPLETED | OUTPATIENT
Start: 2019-10-05 | End: 2019-10-05

## 2019-10-05 RX ADMIN — DILTIAZEM HYDROCHLORIDE 10 MG: 5 INJECTION INTRAVENOUS at 18:39

## 2019-10-05 RX ADMIN — RIVAROXABAN 20 MG: 10 TABLET, FILM COATED ORAL at 17:31

## 2019-10-05 RX ADMIN — SODIUM CHLORIDE: 9 INJECTION, SOLUTION INTRAVENOUS at 17:04

## 2019-10-05 RX ADMIN — DILTIAZEM HYDROCHLORIDE 10 MG: 5 INJECTION INTRAVENOUS at 17:00

## 2019-10-05 RX ADMIN — SOTALOL HYDROCHLORIDE 80 MG: 80 TABLET ORAL at 18:15

## 2019-10-05 ASSESSMENT — ENCOUNTER SYMPTOMS
BACK PAIN: 0
ADENOPATHY: 0
CHEST TIGHTNESS: 0
BRUISES/BLEEDS EASILY: 0
PALPITATIONS: 1
WOUND: 0
SHORTNESS OF BREATH: 0
CONFUSION: 0
HEMATURIA: 0
ABDOMINAL PAIN: 0
CHILLS: 0
FEVER: 0

## 2019-10-05 ASSESSMENT — MIFFLIN-ST. JEOR: SCORE: 1778.75

## 2019-10-05 NOTE — ED AVS SNAPSHOT
[facility]  [address]  [place] [address]  Phone:  [phone]  Fax:  [phone]                                    Darron Tatum   MRN: [id number]    Department:  [facility] [place] [facility]   Date of Visit:  10/5/2019           After Visit Summary Signature Page    I have received my discharge instructions, and my questions have been answered. I have discussed any challenges I see with this plan with the nurse or doctor.    ..........................................................................................................................................  Patient/Patient Representative Signature      ..........................................................................................................................................  Patient Representative Print Name and Relationship to Patient    ..................................................               ................................................  Date                                   Time    ..........................................................................................................................................  Reviewed by Signature/Title    ...................................................              ..............................................  Date                                               Time          22EPIC Rev 08/18

## 2019-10-05 NOTE — ED TRIAGE NOTES
"Pt here by himself, pt reports a hx of afib, pt states that 2days ago he was playing [employer] and noticed her heart rate feeling irregular and fast, pt c/o still feeling tachycardic, pt into bay 3 ambulatory, settled onto cart, ekg being done, pt denies any pain and states that he actually feels \"pretty good\"  "

## 2019-10-05 NOTE — ED PROVIDER NOTES
History     Chief Complaint   Patient presents with     Tachycardia     HPI  Darron Tatum is a 63 year old male who has history of paroxysmal atrial fibrillation.  Currently on sotalol 80 mg twice daily.  Has not recently had follow-up with [facility] cardiology.    Noted that Thursday morning after playing racBaitianshi, he started having some increased exertional fatigue and lack of energy.  He went home and found his pulse to be irregular and fast.  Suspected it was atrial fibrillation again.  Pulse was in the 120s to 130 range at times.  States he has had this before and it has self corrected previously.  He waited some time before coming in and yesterday his heart rate slowly climbed.  At one point in time yesterday evening his pulse did drop down into the 70s and is low as 57 one time but then went back up again today.  He called nurse hotline and they recommended coming in for evaluation.    Today before coming in his pulse was regular but fast in the 1 20-1 30 range.    Patient states his diastolic levels were slowly climbing initially in the 80s in the Xarelto in the past but no longer taking.    Had echocardiogram 2/26/2018 showed EF 55-60%.  Mild MR and TR.    Denies chest pain heaviness or shortness of breath.  Just has some exertional intolerance, lack of exercise tolerance    Allergies:  No Known Allergies    Problem List:    Patient Active Problem List    Diagnosis Date Noted     Atrial flutter with rapid ventricular response (H) 10/05/2019     Priority: Medium     Mixed hyperlipidemia 03/18/2019     Priority: Medium     Thrombocytopenia (H) 03/18/2019     Priority: Medium     Preglaucoma 01/24/2018     Priority: Medium     Personal history of urinary calculi 01/24/2018     Priority: Medium     Paroxysmal atrial fibrillation (H) - hx of cardioversion 11/09/2017     Priority: Medium     Onychomycosis 12/14/2015     Priority: Medium     Actinic keratosis of left cheek 09/26/2013     Priority: Medium      Benign prostatic hyperplasia 09/26/2013     Priority: Medium        Past Medical History:    Past Medical History:   Diagnosis Date     Atrial fibrillation (H)      Calculus of kidney      Enlarged prostate without lower urinary tract symptoms (luts)      Preglaucoma        Past Surgical History:    Past Surgical History:   Procedure Laterality Date     COLONOSCOPY  04/30/2012 2007, 2012,Follow up 2017, history of polyps     FINGER SURGERY      repair, left long finger injury     OTHER SURGICAL HISTORY      1998,,HERNIA REPAIR,Right, repair with mesh     OTHER SURGICAL HISTORY      20s,205329,REPAIR RETINAL DETACH,Retinal hole     OTHER SURGICAL HISTORY      02/2017,207069,CARDIOVERSION,Atrial Fibrillation       Family History:    Family History   Problem Relation Age of Onset     Cancer Mother         Cancer,brain tumor     Hypertension Mother         Hypertension     Genitourinary Problems Father         Genitourinary Disease,BPH     Other - See Comments Father         peptic ulcer disease     Other - See Comments Brother         detached retina     Genitourinary Problems Brother         Genitourinary Disease,BPH     Skin Cancer Brother         Skin cancer     Other - See Comments Brother         detached retina     Heart Disease Brother         Heart Disease,atrial fibrillation/flutter with TIA's     Other - See Comments Sister         osteopenia       Social History:  Marital Status:   [2]  Social History     Tobacco Use     Smoking status: Never Smoker     Smokeless tobacco: Never Used   Substance Use Topics     Alcohol use: Yes     Alcohol/week: 4.2 standard drinks     Comment: Alcoholic Drinks/day: glass of Wine, 5-6 times a week     Drug use: No     Comment: Drug use: No        Medications:    diltiazem (CARDIZEM) 30 MG tablet  rivaroxaban ANTICOAGULANT (XARELTO ANTICOAGULANT) 20 MG TABS tablet  sotalol (BETAPACE) 80 MG tablet  travoprost, LONI Free, (TRAVATAN Z) 0.004 % ophthalmic  "solution          Review of Systems   Constitutional: Negative for chills and fever.   HENT: Negative for congestion.    Eyes: Negative for visual disturbance.   Respiratory: Negative for chest tightness and shortness of breath.    Cardiovascular: Positive for palpitations. Negative for chest pain.   Gastrointestinal: Negative for abdominal pain.   Genitourinary: Negative for hematuria.   Musculoskeletal: Negative for back pain.   Skin: Negative for rash and wound.   Neurological: Negative for syncope.   Hematological: Negative for adenopathy. Does not bruise/bleed easily.   Psychiatric/Behavioral: Negative for confusion.       Physical Exam   BP: (!) 156/122  Pulse: 128  Heart Rate: 131  Temp: 97.4  F (36.3  C)  Resp: 16  Height: 190.5 cm (6' 3\")  Weight: 89.8 kg (198 lb)  SpO2: 98 %      Physical Exam  Constitutional:       General: He is not in acute distress.     Appearance: He is well-developed. He is not diaphoretic.   HENT:      Head: Normocephalic and atraumatic.   Eyes:      General: No scleral icterus.     Conjunctiva/sclera: Conjunctivae normal.   Neck:      Musculoskeletal: Neck supple.   Cardiovascular:      Rate and Rhythm: Regular rhythm. Tachycardia present.      Heart sounds: No murmur.   Pulmonary:      Effort: Pulmonary effort is normal.      Breath sounds: Normal breath sounds.   Abdominal:      Palpations: Abdomen is soft.      Tenderness: There is no tenderness.   Musculoskeletal:         General: No deformity.   Lymphadenopathy:      Cervical: No cervical adenopathy.   Skin:     General: Skin is warm and dry.      Findings: No rash.   Neurological:      General: No focal deficit present.      Mental Status: He is alert.   Psychiatric:         Mood and Affect: Mood normal.         Behavior: Behavior normal.       No Known Allergies  Patient Vitals for the past 24 hrs:   BP Temp Temp src Pulse Resp SpO2 Height Weight   10/05/19 1915 112/83 -- -- 62 12 96 % -- --   10/05/19 1900 125/83 -- -- 69 " "15 96 % -- --   10/05/19 1845 134/83 -- -- 80 13 96 % -- --   10/05/19 1830 (!) 149/87 -- -- 98 18 97 % -- --   10/05/19 1815 139/87 -- -- 80 16 98 % -- --   10/05/19 1800 134/79 -- -- 92 21 96 % -- --   10/05/19 1745 (!) 132/94 -- -- 74 20 96 % -- --   10/05/19 1730 (!) 137/114 -- -- 100 12 98 % -- --   10/05/19 1715 124/81 -- -- 88 19 97 % -- --   10/05/19 1700 (!) 134/107 -- -- 130 13 97 % -- --   10/05/19 1645 (!) 135/104 -- -- 133 11 97 % -- --   10/05/19 1630 (!) 149/116 -- -- 131 20 98 % -- --   10/05/19 1623 (!) 156/122 97.4  F (36.3  C) Tympanic -- -- -- -- --   10/05/19 1620 -- -- -- 128 16 98 % 1.905 m (6' 3\") 89.8 kg (198 lb)     Orders Placed This Encounter   Procedures     CBC with platelets differential     Comprehensive metabolic panel     Troponin GH (now)     TSH Reflex GH     EKG 12 lead     Cardiac Continuous Monitoring     Pulse oximetry nursing     Peripheral IV: Standard     Review medications with patient       ED Course     ED Course as of Oct 05 1943   Sat Oct 05, 2019   1700 Called heart [place], cardiology, spoke with cardiology on-call.  Advise initiating IV diltiazem followed by IV drip diltiazem if needed with hospitalization and consider cardioversion Monday if needed.  Patient is not currently on Xarelto or any other blood thinners.  He has been on Xarelto in the past.      1705 10 mg IV diltiazem ordered with repeat orders after 15 minutes if heart rate is still elevated and initiation of drip 15 minutes later if pulse is still elevated.      1720 Patient's heart rate has been maintaining in the 70s-90s since 10 mg of IV diltiazem x1 dose administered.  Maintains atrial flutter.      1808 Due for home dosing of sotalol 80 mg.  Orders placed.  Heart rates have maintained mid 70s up to approximately 100.      1942 Patient's heart rate did start to climb up and required second dose of 10 mg IV diltiazem.  Has never had any chest pain heaviness or shortness of breath.  Blood pressures " also came down.  He wishes to discharge home and start oral diltiazem tomorrow.  Oral Xarelto old prescription starting tomorrow night also ordered.  He will call his cardiologist on Monday and schedule follow-up.        Procedures          EKG at 4:23 PM showed atrial flutter with 2 1 AV conduction.  Rate 130 bpm.  Normal axis.  ST depression, nonspecific T wave abnormality.       Results for orders placed or performed during the hospital encounter of 10/05/19 (from the past 24 hour(s))   CBC with platelets differential   Result Value Ref Range    WBC 8.2 4.0 - 11.0 10e9/L    RBC Count 4.85 4.4 - 5.9 10e12/L    Hemoglobin 15.4 13.3 - 17.7 g/dL    Hematocrit 45.7 40.0 - 53.0 %    MCV 94 78 - 100 fl    MCH 31.8 26.5 - 33.0 pg    MCHC 33.7 31.5 - 36.5 g/dL    RDW 11.9 10.0 - 15.0 %    Platelet Count 198 150 - 450 10e9/L    Diff Method Automated Method     % Neutrophils 57.3 %    % Lymphocytes 30.3 %    % Monocytes 9.4 %    % Eosinophils 1.7 %    % Basophils 0.9 %    % Immature Granulocytes 0.4 %    Absolute Neutrophil 4.7 1.6 - 8.3 10e9/L    Absolute Lymphocytes 2.5 0.8 - 5.3 10e9/L    Absolute Monocytes 0.8 0.0 - 1.3 10e9/L    Absolute Eosinophils 0.1 0.0 - 0.7 10e9/L    Absolute Basophils 0.1 0.0 - 0.2 10e9/L    Abs Immature Granulocytes 0.0 0 - 0.4 10e9/L   Comprehensive metabolic panel   Result Value Ref Range    Sodium 138 134 - 144 mmol/L    Potassium 3.9 3.5 - 5.1 mmol/L    Chloride 103 98 - 107 mmol/L    Carbon Dioxide 28 21 - 31 mmol/L    Anion Gap 7 3 - 14 mmol/L    Glucose 121 (H) 70 - 105 mg/dL    Urea Nitrogen 20 7 - 25 mg/dL    Creatinine 1.31 (H) 0.70 - 1.30 mg/dL    GFR Estimate 55 (L) >60 mL/min/[1.73_m2]    GFR Estimate If Black 67 >60 mL/min/[1.73_m2]    Calcium 9.4 8.6 - 10.3 mg/dL    Bilirubin Total 0.5 0.3 - 1.0 mg/dL    Albumin 4.4 3.5 - 5.7 g/dL    Protein Total 7.3 6.4 - 8.9 g/dL    Alkaline Phosphatase 56 34 - 104 U/L    ALT 16 7 - 52 U/L    AST 20 13 - 39 U/L   Troponin GH (now)   Result  Value Ref Range    Troponin 6.0 <18.0 pg/mL   TSH Reflex GH   Result Value Ref Range    TSH Reflex 4.37 0.34 - 5.60 IU/mL       Medications   sodium chloride 0.9% infusion ( Intravenous New Bag 10/5/19 1704)   diltiazem (CARDIZEM) 100 MG in 100 mL 5% dextrose infusion (has no administration in time range)   rivaroxaban ANTICOAGULANT (XARELTO) tablet 20 mg (20 mg Oral Given 10/5/19 1731)   diltiazem (CARDIZEM) injection 10 mg (10 mg Intravenous Given 10/5/19 1700)   diltiazem (CARDIZEM) injection 10 mg (10 mg Intravenous Given 10/5/19 1839)   sotalol (BETAPACE) tablet 80 mg (80 mg Oral Given 10/5/19 1815)       Assessments & Plan (with Medical Decision Making)     I have reviewed the nursing notes.    I have reviewed the findings, diagnosis, plan and need for follow up with the patient.  Start diltiazem half to 1 tablet 4 times daily as needed for heart rate control.  Start Xarelto 20 mg every evening.    Cardiology follow-up advised in the near future.  Advised he called his cardiology clinic on Monday to set up follow-up appointment    New Prescriptions    DILTIAZEM (CARDIZEM) 30 MG TABLET    Take 0.5-1 tablets (15-30 mg) by mouth 4 times daily -- adjust dose as needed for heart rate control    RIVAROXABAN ANTICOAGULANT (XARELTO ANTICOAGULANT) 20 MG TABS TABLET    Take 1 tablet (20 mg) by mouth daily (with dinner)       Final diagnoses:   Atrial flutter with rapid ventricular response (H)       10/5/2019   [facility]     Jacob Jin MD  10/05/19 1944

## 2019-10-05 NOTE — ED NOTES
Heart rate has been slowly increasing ranging from the low 100's to 118.  Second dose of 10mg IV Cardizem administered.  Continue to deny any chest pain or discomfort.  Patient ate dinner that his wife brought in.  Resting comfortably at this times   Will continue to monitor.   Charlene Pina RN on 10/5/2019 at 6:43 PM

## 2019-10-05 NOTE — ED NOTES
Heart rhythm continues to be Aflutter.  Heart rate is ranging from the 70's to low 90's.  Will continue to monitor.  Charlene Pina RN on 10/5/2019 at 6:50 PM

## 2019-10-05 NOTE — ED NOTES
Heart rhythm continues to be Aflutter.  Heart rate ranges from 70's to [id number]'s (briefly). Gave home dose of Sotalol 80mg.   Will continue to monitor. Charlene Pina RN on 10/5/2019 at 6:21 PM

## 2019-10-05 NOTE — ED NOTES
Patients heart rate has been consistently ranging from 70's to 90's since 10 mg of IV Cardizem was administered. Heart rhythm continues to be in atrial flutter.  No additional medications will be administered at this time.  Will continue to monitor.    Charlene Pina RN on 10/5/2019 at 5:23 PM

## 2019-10-06 NOTE — DISCHARGE INSTRUCTIONS
Take 0.5 to 1 tablet of 30 mg diltiazem 4 times daily as needed.    - Adjust dose for heart rate control.    [facility] and talk with your cardiologist if you have persistent atrial fibrillation/flutter.

## 2019-10-22 ENCOUNTER — ALLIED HEALTH/NURSE VISIT (OUTPATIENT)
Dept: FAMILY MEDICINE | Facility: OTHER | Age: 64
End: 2019-10-22
Attending: INTERNAL MEDICINE
Payer: COMMERCIAL

## 2019-10-22 DIAGNOSIS — Z23 NEED FOR PROPHYLACTIC VACCINATION AND INOCULATION AGAINST INFLUENZA: Primary | ICD-10-CM

## 2019-10-22 PROCEDURE — 90686 IIV4 VACC NO PRSV 0.5 ML IM: CPT

## 2019-10-22 PROCEDURE — 90471 IMMUNIZATION ADMIN: CPT

## 2019-11-25 ENCOUNTER — MEDICAL CORRESPONDENCE (OUTPATIENT)
Dept: HEALTH INFORMATION MANAGEMENT | Facility: OTHER | Age: 64
End: 2019-11-25

## 2019-11-25 DIAGNOSIS — I10 HTN (HYPERTENSION): Primary | ICD-10-CM

## 2019-11-25 DIAGNOSIS — R73.9 ELEVATED RANDOM BLOOD GLUCOSE LEVEL: ICD-10-CM

## 2019-11-25 DIAGNOSIS — E78.2 MIXED HYPERLIPIDEMIA: ICD-10-CM

## 2019-11-25 DIAGNOSIS — D69.6 THROMBOCYTOPENIA (H): ICD-10-CM

## 2019-11-25 LAB
CREAT SERPL-MCNC: 1.23 MG/DL (ref 0.7–1.3)
GFR SERPL CREATININE-BSD FRML MDRD: 59 ML/MIN/{1.73_M2}
POTASSIUM SERPL-SCNC: 4 MMOL/L (ref 3.5–5.1)
SODIUM SERPL-SCNC: 140 MMOL/L (ref 134–144)

## 2019-11-25 PROCEDURE — 82565 ASSAY OF CREATININE: CPT | Mod: ZL

## 2019-11-25 PROCEDURE — 84132 ASSAY OF SERUM POTASSIUM: CPT | Mod: ZL

## 2019-11-25 PROCEDURE — 36415 COLL VENOUS BLD VENIPUNCTURE: CPT | Mod: ZL

## 2019-11-25 PROCEDURE — 84295 ASSAY OF SERUM SODIUM: CPT | Mod: ZL

## 2020-01-08 ENCOUNTER — MEDICAL CORRESPONDENCE (OUTPATIENT)
Dept: HEALTH INFORMATION MANAGEMENT | Facility: OTHER | Age: 65
End: 2020-01-08

## 2020-01-08 DIAGNOSIS — D69.6 THROMBOCYTOPENIA (H): ICD-10-CM

## 2020-01-08 DIAGNOSIS — E78.2 MIXED HYPERLIPIDEMIA: ICD-10-CM

## 2020-01-08 DIAGNOSIS — R73.9 ELEVATED RANDOM BLOOD GLUCOSE LEVEL: ICD-10-CM

## 2020-01-08 DIAGNOSIS — I48.0 PAROXYSMAL ATRIAL FIBRILLATION (H): Primary | ICD-10-CM

## 2020-01-08 LAB
CREAT SERPL-MCNC: 1.27 MG/DL (ref 0.7–1.3)
GFR SERPL CREATININE-BSD FRML MDRD: 57 ML/MIN/{1.73_M2}

## 2020-01-08 PROCEDURE — 82565 ASSAY OF CREATININE: CPT | Mod: ZL

## 2020-01-08 PROCEDURE — 36415 COLL VENOUS BLD VENIPUNCTURE: CPT | Mod: ZL

## 2020-02-05 ENCOUNTER — OFFICE VISIT (OUTPATIENT)
Dept: INTERNAL MEDICINE | Facility: OTHER | Age: 65
End: 2020-02-05
Attending: NURSE PRACTITIONER
Payer: COMMERCIAL

## 2020-02-05 VITALS
BODY MASS INDEX: 25.04 KG/M2 | RESPIRATION RATE: 16 BRPM | OXYGEN SATURATION: 99 % | SYSTOLIC BLOOD PRESSURE: 140 MMHG | HEART RATE: 65 BPM | WEIGHT: 201.4 LBS | TEMPERATURE: 96.2 F | DIASTOLIC BLOOD PRESSURE: 80 MMHG | HEIGHT: 75 IN

## 2020-02-05 DIAGNOSIS — I48.0 PAROXYSMAL ATRIAL FIBRILLATION (H): ICD-10-CM

## 2020-02-05 DIAGNOSIS — M77.11 LATERAL EPICONDYLITIS OF RIGHT ELBOW: ICD-10-CM

## 2020-02-05 DIAGNOSIS — M79.621 PAIN OF RIGHT UPPER ARM: ICD-10-CM

## 2020-02-05 DIAGNOSIS — Z09 ENCOUNTER FOR FOLLOW-UP EXAMINATION: Primary | ICD-10-CM

## 2020-02-05 PROBLEM — I10 HTN (HYPERTENSION): Status: ACTIVE | Noted: 2019-11-13

## 2020-02-05 PROCEDURE — 99213 OFFICE O/P EST LOW 20 MIN: CPT | Performed by: INTERNAL MEDICINE

## 2020-02-05 RX ORDER — BRIMONIDINE TARTRATE 1 MG/ML
1 SOLUTION/ DROPS OPHTHALMIC
COMMUNITY
End: 2021-08-09

## 2020-02-05 RX ORDER — LOSARTAN POTASSIUM 25 MG/1
50 TABLET ORAL
COMMUNITY
Start: 2020-01-13 | End: 2020-02-05

## 2020-02-05 RX ORDER — PANTOPRAZOLE SODIUM 40 MG/1
40 TABLET, DELAYED RELEASE ORAL
COMMUNITY
Start: 2020-01-29 | End: 2020-08-07

## 2020-02-05 ASSESSMENT — MIFFLIN-ST. JEOR: SCORE: 1789.17

## 2020-02-05 ASSESSMENT — PAIN SCALES - GENERAL: PAINLEVEL: NO PAIN (0)

## 2020-02-05 NOTE — PROGRESS NOTES
"Chief Complaint   Patient presents with     RECHECK     ablation at [facility]         HPI: Mr. Tatum is a 64 year old male who presents today for follow up of recent cardiac ablation.    He had the cardiac ablation done on January 28th.  He has been doing well since then.  He had a couple days with some discomfort however now that has resolved.  He has not had any issues with atrial fibrillation.  He denies any significant bruising, swelling or pain at the site.    He has been having some issues with his right elbow.  This is been going on over the last several months.  It seems to be worse after he plays racquetball and more recently has gotten worse with racquetball.  He denies any outright injury.  He has not been taking any regular medications for this.  He has iced it occasionally but not consistently.    He  reports that he has never smoked. He has never used smokeless tobacco.    Past medical history reviewed as below:     Past Medical History:   Diagnosis Date     Atrial fibrillation (H)     10/31/2014     Calculus of kidney     passed spontaneous, age of 30     Enlarged prostate without lower urinary tract symptoms (luts)     9/26/2013     Preglaucoma     No Comments Provided   .      ROS  Pertinent ROS was performed and was negative, including for fever, chills. No other concerns, with exception of HPI above.      EXAM:   BP (!) 140/80 (BP Location: Right arm, Patient Position: Sitting, Cuff Size: Adult Large)   Pulse 65   Temp 96.2  F (35.7  C) (Tympanic)   Resp 16   Ht 1.905 m (6' 3\")   Wt 91.4 kg (201 lb 6.4 oz)   SpO2 99%   BMI 25.17 kg/m      Estimated body mass index is 25.17 kg/m  as calculated from the following:    Height as of this encounter: 1.905 m (6' 3\").    Weight as of this encounter: 91.4 kg (201 lb 6.4 oz).      GEN: Vitals reviewed. Healthy appearing. Patient is in no acute distress. Cooperative with exam.  HEENT: Normocephalic atraumatic.  Pupils equally round.  No scleral icterus, " no conjunctival erythema.    LUNGS: Chest rise equal bilaterally.  No accessory muscle use.  ABD: Nondistended  SKIN: Warm and dry to touch.  No rash on face, arms and legs.  Mild bruising is noted along the right groin.  No tenderness to palpation.  No significant induration.  No erythema is present.  EXT: Right elbow with no obvious abnormality.  Minimal tenderness to palpation of the lateral and medial epicondyle.  No decrease in range of motion is present at this time.  PSYCH: Mood is good.  Affect appropriate. Speech fluent. Answers questions appropriately and thought process normal.     ASSESSMENT AND PLAN:    Encounter for follow-up examination  -Incision inspected.  No abnormality present.  Appears to have healed well.    Paroxysmal atrial fibrillation (H) - hx of cardioversion  -He is to monitor for signs of any recurrence of his atrial fibrillation.    Lateral epicondylitis of right elbow  - Ice, elevation, gentle movement/rest as tolerated  - Ibuprofen, Naproxen or Tylenol as needed  - offered PT ,patient is interested at this time  - patient is to call if he has additional problems with this or if new symptoms develop  - PHYSICAL THERAPY REFERRAL    Pain of right upper arm  See above  - PHYSICAL THERAPY REFERRAL                 Return in about 11 weeks (around 4/22/2020).      SINA KING, DO   2/5/2020 1:40 PM    This document was prepared using voice generated softwear. While every attempt was made for accuracy, grammatical errors may exist.

## 2020-02-05 NOTE — NURSING NOTE
Chief Complaint   Patient presents with     RECHECK     ablation at [facility]   Patient presents to the clinic today for a one week checkup from ablation done at [facility]    Medication Reconciliation: completed   Dory Palumbo LPN  2/5/2020 11:03 AM

## 2020-02-07 ENCOUNTER — HOSPITAL ENCOUNTER (OUTPATIENT)
Dept: PHYSICAL THERAPY | Facility: OTHER | Age: 65
Setting detail: THERAPIES SERIES
End: 2020-02-07
Attending: INTERNAL MEDICINE
Payer: COMMERCIAL

## 2020-02-07 DIAGNOSIS — M77.11 LATERAL EPICONDYLITIS OF RIGHT ELBOW: ICD-10-CM

## 2020-02-07 DIAGNOSIS — M79.621 PAIN OF RIGHT UPPER ARM: ICD-10-CM

## 2020-02-07 PROCEDURE — 97161 PT EVAL LOW COMPLEX 20 MIN: CPT | Mod: GP,XU | Performed by: PHYSICAL THERAPIST

## 2020-02-07 PROCEDURE — 97530 THERAPEUTIC ACTIVITIES: CPT | Mod: GP | Performed by: PHYSICAL THERAPIST

## 2020-02-07 PROCEDURE — 97035 APP MDLTY 1+ULTRASOUND EA 15: CPT | Mod: GP | Performed by: PHYSICAL THERAPIST

## 2020-02-07 NOTE — PROGRESS NOTES
[facility]          OUTPATIENT PHYSICAL THERAPY ORTHOPEDIC EVALUATION  PLAN OF TREATMENT FOR OUTPATIENT REHABILITATION  (COMPLETE FOR INITIAL CLAIMS ONLY)  Patient's Last Name, First Name, M.I.  YOB: 1955  Darron Tatum    Provider s Name:  [facility]   Medical Record No.  [id number]   Start of Care Date:  02/07/20   Onset Date:  09/07/19   Type:     _X__PT   ___OT   ___SLP Medical Diagnosis:   Right upper arm pain, right lateral epicondylitis      PT Diagnosis:  right shoulder and elbow pain, impaired strength   Visits from SOC:  1      _________________________________________________________________________________  Plan of Treatment/Functional Goals:  joint mobilization, manual therapy, neuromuscular re-education, ROM, strengthening, stretching  TE, TA, NMR, manual therapy   Cryotherapy, Electrical stimulation, Hot packs, Iontophoresis, Ultrasound    phonophoresis with 10% ketoprofen, iontophoresis with 4% dexamethasone    Goals  Goal Identifier: pain  Goal Description: Darron will report decreased right shoulder and elbow pain with activities to no greater than 4/10 to faciltiate ease with completing activities without difficulty.   Target Date: 03/06/20    Goal Identifier: strength  Goal Description: Darron will improve right wrist strength to at least 4+/5 throughout without pain for ease with ADL's including lifitng and carrying items.   Target Date: 03/20/20    Goal Identifier: ROM  Goal Description: Darron will improve right wrist AROM by at least 5 degrees into supination without increased pain for ease with activities including lifting and meal prep        Goal Identifier: (P) strength  Goal Description: (P) Darron will increase his right  strength by 10# without pain to faciltiate ease with lifting and holding grandkids as well as completing home activities.  Target Date: (P) 04/03/20                                                Therapy  Frequency:  other (see comments)(16 visits)  Predicted Duration of Therapy Intervention:  8 weeks    Celeste Marroquin, PT                 I CERTIFY THE NEED FOR THESE SERVICES FURNISHED UNDER        THIS PLAN OF TREATMENT AND WHILE UNDER MY CARE     (Physician co-signature of this document indicates review and certification of the therapy plan).                       Certification Date From:    2/7/2020  Certification Date To:   4/30/20    Referring Provider:  Dr. Fan     Initial Assessment        See Epic Evaluation Start of Care Date: 02/07/20

## 2020-02-07 NOTE — PROGRESS NOTES
02/07/20 0800   General Information   Type of Visit Initial OP Ortho PT Evaluation   Start of Care Date 02/07/20   Referring Physician Dr. Fan    Patient/Family Goals Statement Be able to play pickleball and tennis without pain, lift and hold my grand kids without pain    Orders Evaluate and Treat   Date of Order 02/05/20   Medical Diagnosis right lateral epicondylitis, right upper arm pain   Surgical/Medical history reviewed Yes   General Information Comments recently underwent an ablation due to a-fib    Body Part(s)   Body Part(s) Shoulder;Elbow/Wrist   Presentation and Etiology   Pertinent history of current problem (include personal factors and/or comorbidities that impact the POC) Patient is right handed, pain started about 6 months ago slowly, plays raquetball, pickleball and tennis. Reports in the last few days he has had some clicking in his right shoulder, otherwise pain is mostly in right forearm and into biceps. Was hanging a picture and struggled holding it, painful holding his small dog, also with flossing his teeth he has to brace his arm, holding his grandkids is difficult. Not constant pain, not waking him up or keeping him awake. Movement is not the greater issue-it's more static holds. Carrying boxes is irritating.    Impairments A. Pain;E. Decreased flexibility   Functional Limitations perform desired leisure / sports activities;perform activities of daily living   Symptom Location right forearm, right biceps distally and right UT and scapular region   How/Where did it occur With repetition/overuse   Onset date of current episode/exacerbation 09/07/19   Chronicity Chronic   Pain rating (0-10 point scale) Best (/10);Worst (/10)   Best (/10) 0/10   Worst (/10) 6/10   Pain quality A. Sharp;H. Other  (tight with sharpness)   Pain quality comment tightness with some sharp pains   Frequency of pain/symptoms C. With activity   Pain/symptoms are: Worse during the day   Pain/symptoms exacerbated by D.  Carrying;G. Certain positions;J. ADL   Pain/symptoms eased by C. Rest;F. Certain positions   Progression of symptoms since onset: Worsened   Prior Level of Function   Prior Level of Function-Mobility independent and pain free   Prior Level of Function-ADLs independent and pain free   Current Level of Function   Patient role/employment history F. Retired   Living environment [facility]   Fall Risk Screen   Fall screen completed by PT   Have you fallen 2 or more times in the past year? No   Have you fallen and had an injury in the past year? No   Is patient a fall risk? No   Abuse Screen (yes response referral indicated)   Feels Unsafe at Home or Work/School no   Feels Threatened by Someone no   Does Anyone Try to Keep You From Having Contact with Others or Doing Things Outside Your Home? no   Physical Signs of Abuse Present no   Shoulder Objective Findings   Side (if bilateral, select both right and left) Right   Posture Rounded shoulders with right slightly lower than left in seated and stance   Cervical Screen (ROM, quadrant) limited right rotation with tightness into UT reported   Neer's Test +   Perea-Migel Test +   Coracoid Test -   Bursa Test -   Mountrail's Test -   Palpation mildly TTP along bicipital groove with more tenderness along UT and levator with muscle guarding.    Right Shoulder Flexion AROM 160   Right Shoulder Abduction AROM 160   Right Shoulder ER AROM 90   Right Shoulder IR AROM T7   Right Shoulder Flexion Strength 4+/5   Right Shoulder Abduction Strength 4+/5   Right Shoulder ER Strength 4+/5   Right Shoulder IR Strength 4+/5   Elbow/Wrist Objective Findings   Side (if bilateral, select both right and left) Right   Elbow/Wrist Strength Comments wrist extension 4-/5 with mild discomfort, flexion 5/5, ulnar deviation 4+/5, radial deviation 4+/5, supination 4/5 with discomfort, pronation 4-/5 with discomfort    Lateral Epicondylitis Test +   Phalen's Test -   Reverse Phalen's Test -    Palpation TTP along right forearm starting at distal biceps and olecranon process into wrist extensor muscle bellies   Right Elbow Flexion/Extension AROM WFL   Right Wrist Flexion AROM 60   Right Wrist Extension AROM 70   Right Wrist Radial Deviation AROM 20   Right Wrist Ulnar Deviation AROM 20   Right Wrist Supination AROM 70 with tightness/discomfort   Right Wrist Pronation AROM 70 with tightness/discomfort    Right  Strength (lbs) RIGHT: at setting 1: 50#, 3: 100#, 5: 80#. LEFT: setting 1: 90, 3: 110#, 5: 90#   Planned Therapy Interventions   Planned Therapy Interventions joint mobilization;manual therapy;neuromuscular re-education;ROM;strengthening;stretching   Planned Therapy Interventions Comment TE, TA, NMR, manual therapy    Planned Modality Interventions   Planned Modality Interventions Cryotherapy;Electrical stimulation;Hot packs;Iontophoresis;Ultrasound   Planned Modality Interventions Comments phonophoresis with 10% ketoprofen, iontophoresis with 4% dexamethasone   Clinical Impression   Criteria for Skilled Therapeutic Interventions Met yes, treatment indicated   PT Diagnosis right shoulder and elbow pain, impaired strength   Functional limitations due to impairments impaired functional strength on dominant arm for ADL's    Clinical Presentation Stable/Uncomplicated   Clinical Presentation Rationale clinical judgement   Clinical Decision Making (Complexity) Low complexity   Therapy Frequency other (see comments)  (16 visits)   Predicted Duration of Therapy Intervention (days/wks) 8 weeks   Risk & Benefits of therapy have been explained Yes   Patient, Family & other staff in agreement with plan of care Yes   Education Assessment   Preferred Learning Style Demonstration;Pictures/video;Listening   Barriers to Learning No barriers   ORTHO GOALS   PT Ortho Eval Goals 1;2;3;4   Ortho Goal 1   Goal Identifier pain   Goal Description Darron will report decreased right shoulder and elbow pain with  activities to no greater than 4/10 to faciltiate ease with completing activities without difficulty.    Target Date 03/06/20   Ortho Goal 2   Goal Identifier strength   Goal Description Darron will improve right wrist strength to at least 4+/5 throughout without pain for ease with ADL's including lifitng and carrying items.    Target Date 03/20/20   Ortho Goal 3   Goal Identifier ROM   Goal Description Darron will improve right wrist AROM by at least 5 degrees into supination without increased pain for ease with activities including lifting and meal prep    Ortho Goal 4   Goal Identifier strength   Goal Description Darron will increase his right  strength by 10# without pain to faciltiate ease with lifting and holding grandkids as well as completing home activities.   Target Date 04/03/20   Total Evaluation Time   PT Dayday, Low Complexity Minutes (61661) 30

## 2020-02-11 ENCOUNTER — HOSPITAL ENCOUNTER (OUTPATIENT)
Dept: PHYSICAL THERAPY | Facility: OTHER | Age: 65
Setting detail: THERAPIES SERIES
End: 2020-02-11
Attending: INTERNAL MEDICINE
Payer: COMMERCIAL

## 2020-02-11 PROCEDURE — 97035 APP MDLTY 1+ULTRASOUND EA 15: CPT | Mod: GP | Performed by: PHYSICAL THERAPIST

## 2020-02-11 PROCEDURE — 97530 THERAPEUTIC ACTIVITIES: CPT | Mod: GP | Performed by: PHYSICAL THERAPIST

## 2020-02-25 ENCOUNTER — HOSPITAL ENCOUNTER (OUTPATIENT)
Dept: PHYSICAL THERAPY | Facility: OTHER | Age: 65
Setting detail: THERAPIES SERIES
End: 2020-02-25
Attending: INTERNAL MEDICINE
Payer: COMMERCIAL

## 2020-02-25 PROCEDURE — 97035 APP MDLTY 1+ULTRASOUND EA 15: CPT | Mod: GP

## 2020-02-25 PROCEDURE — 97530 THERAPEUTIC ACTIVITIES: CPT | Mod: GP

## 2020-03-02 ENCOUNTER — HOSPITAL ENCOUNTER (OUTPATIENT)
Dept: PHYSICAL THERAPY | Facility: OTHER | Age: 65
Setting detail: THERAPIES SERIES
End: 2020-03-02
Attending: INTERNAL MEDICINE
Payer: COMMERCIAL

## 2020-03-02 PROCEDURE — 97530 THERAPEUTIC ACTIVITIES: CPT | Mod: GP

## 2020-03-02 PROCEDURE — 97035 APP MDLTY 1+ULTRASOUND EA 15: CPT | Mod: GP

## 2020-03-06 ENCOUNTER — HOSPITAL ENCOUNTER (OUTPATIENT)
Dept: PHYSICAL THERAPY | Facility: OTHER | Age: 65
Setting detail: THERAPIES SERIES
End: 2020-03-06
Attending: INTERNAL MEDICINE
Payer: COMMERCIAL

## 2020-03-06 PROCEDURE — 97530 THERAPEUTIC ACTIVITIES: CPT | Mod: GP

## 2020-03-06 PROCEDURE — 97035 APP MDLTY 1+ULTRASOUND EA 15: CPT | Mod: GP

## 2020-03-11 ENCOUNTER — HOSPITAL ENCOUNTER (OUTPATIENT)
Dept: PHYSICAL THERAPY | Facility: OTHER | Age: 65
Setting detail: THERAPIES SERIES
End: 2020-03-11
Attending: INTERNAL MEDICINE
Payer: COMMERCIAL

## 2020-03-11 ENCOUNTER — TELEPHONE (OUTPATIENT)
Dept: INTERNAL MEDICINE | Facility: OTHER | Age: 65
End: 2020-03-11

## 2020-03-11 DIAGNOSIS — M79.631 RIGHT FOREARM PAIN: Primary | ICD-10-CM

## 2020-03-11 PROCEDURE — 97035 APP MDLTY 1+ULTRASOUND EA 15: CPT | Mod: GP | Performed by: PHYSICAL THERAPIST

## 2020-03-11 PROCEDURE — 97530 THERAPEUTIC ACTIVITIES: CPT | Mod: GP | Performed by: PHYSICAL THERAPIST

## 2020-03-11 NOTE — TELEPHONE ENCOUNTER
Darron continues to have forearm pain on right, shoulder is improving. Would like to see OT as his forearm pain does not seem to be improving. Please send an OT referral if you agree. Thank you.

## 2020-03-13 ENCOUNTER — HOSPITAL ENCOUNTER (OUTPATIENT)
Dept: PHYSICAL THERAPY | Facility: OTHER | Age: 65
Setting detail: THERAPIES SERIES
End: 2020-03-13
Attending: INTERNAL MEDICINE
Payer: COMMERCIAL

## 2020-03-13 PROCEDURE — 97035 APP MDLTY 1+ULTRASOUND EA 15: CPT | Mod: GP | Performed by: PHYSICAL THERAPIST

## 2020-03-13 PROCEDURE — 97530 THERAPEUTIC ACTIVITIES: CPT | Mod: GP | Performed by: PHYSICAL THERAPIST

## 2020-06-18 NOTE — PROGRESS NOTES
Outpatient Physical Therapy Discharge Note     Patient: Darron Tatum  : 1955    Beginning/End Dates of Reporting Period:  20 to 2020    Referring Provider: Dr. Fan    Therapy Diagnosis: right shoulder and elbow pain, impaired strength     Client Self Report: Tino states he changed the angle of the wrist flexion stretch per PT recommendation and feels his forearm is doing better. Does not want to schedule OT right now, will wait a week or two and continue HEP.     Last attended appt was 3/13/20    Objective Measurements:  Objective Measure: pain  Details: 0/10 at rest, 2-4/10 with elbow position (ex holding dog)  Objective Measure:  strength  Details: setting 1: 75#, setting 3: 110#, setting 5: 85#       Goals:  Goal Identifier pain   Goal Description Darron will report decreased right shoulder and elbow pain with activities to no greater than 4/10 to faciltiate ease with completing activities without difficulty.    Target Date 20   Date Met  20   Progress:     Goal Identifier strength   Goal Description Darron will improve right wrist strength to at least 4+/5 throughout without pain for ease with ADL's including lifitng and carrying items.    Target Date 20   Date Met      Progress:     Goal Identifier ROM   Goal Description Darron will improve right wrist AROM by at least 5 degrees into supination without increased pain for ease with activities including lifting and meal prep    Target Date     Date Met      Progress:     Goal Identifier strength   Goal Description Darron will increase his right  strength by 10# without pain to faciltiate ease with lifting and holding grandkids as well as completing home activities.   Target Date 20   Date Met  20   Progress:       Progress Toward Goals:   Progress this reporting period: improved pain levels and strength, patient opted to discontinue therapy and work on HEP to avoid potential exposure to COVID-19.            Plan:  Discharge from therapy.    Discharge:    Reason for Discharge: Patient chooses to discontinue therapy.    Equipment Issued: HEP    Discharge Plan: Patient to continue home program.

## 2020-08-07 ENCOUNTER — OFFICE VISIT (OUTPATIENT)
Dept: INTERNAL MEDICINE | Facility: OTHER | Age: 65
End: 2020-08-07
Attending: INTERNAL MEDICINE
Payer: COMMERCIAL

## 2020-08-07 VITALS
BODY MASS INDEX: 24.25 KG/M2 | DIASTOLIC BLOOD PRESSURE: 94 MMHG | WEIGHT: 195 LBS | SYSTOLIC BLOOD PRESSURE: 144 MMHG | RESPIRATION RATE: 16 BRPM | HEIGHT: 75 IN | TEMPERATURE: 97.1 F | OXYGEN SATURATION: 99 % | HEART RATE: 72 BPM

## 2020-08-07 DIAGNOSIS — I48.0 PAROXYSMAL ATRIAL FIBRILLATION (H): ICD-10-CM

## 2020-08-07 DIAGNOSIS — Z12.5 SCREENING PSA (PROSTATE SPECIFIC ANTIGEN): ICD-10-CM

## 2020-08-07 DIAGNOSIS — Z13.1 SCREENING FOR DIABETES MELLITUS: ICD-10-CM

## 2020-08-07 DIAGNOSIS — I49.9 IRREGULAR HEART BEATS: ICD-10-CM

## 2020-08-07 DIAGNOSIS — N40.0 BENIGN PROSTATIC HYPERPLASIA, UNSPECIFIED WHETHER LOWER URINARY TRACT SYMPTOMS PRESENT: ICD-10-CM

## 2020-08-07 DIAGNOSIS — Z23 NEED FOR TDAP VACCINATION: ICD-10-CM

## 2020-08-07 DIAGNOSIS — I10 ESSENTIAL HYPERTENSION: Primary | ICD-10-CM

## 2020-08-07 PROBLEM — E78.2 MIXED HYPERLIPIDEMIA: Status: RESOLVED | Noted: 2019-03-18 | Resolved: 2020-08-07

## 2020-08-07 PROBLEM — I48.92 ATRIAL FLUTTER WITH RAPID VENTRICULAR RESPONSE (H): Status: RESOLVED | Noted: 2019-10-05 | Resolved: 2020-08-07

## 2020-08-07 PROBLEM — Z09 ENCOUNTER FOR FOLLOW-UP EXAMINATION: Status: RESOLVED | Noted: 2020-02-05 | Resolved: 2020-08-07

## 2020-08-07 PROBLEM — Z87.442 PERSONAL HISTORY OF URINARY CALCULI: Status: RESOLVED | Noted: 2018-01-24 | Resolved: 2020-08-07

## 2020-08-07 PROBLEM — H40.009 PREGLAUCOMA: Status: RESOLVED | Noted: 2018-01-24 | Resolved: 2020-08-07

## 2020-08-07 LAB
ALBUMIN SERPL-MCNC: 4.3 G/DL (ref 3.5–5.7)
ALP SERPL-CCNC: 61 U/L (ref 34–104)
ALT SERPL W P-5'-P-CCNC: 19 U/L (ref 7–52)
ANION GAP SERPL CALCULATED.3IONS-SCNC: 5 MMOL/L (ref 3–14)
AST SERPL W P-5'-P-CCNC: 23 U/L (ref 13–39)
BILIRUB SERPL-MCNC: 0.8 MG/DL (ref 0.3–1)
BUN SERPL-MCNC: 16 MG/DL (ref 7–25)
CALCIUM SERPL-MCNC: 9.4 MG/DL (ref 8.6–10.3)
CHLORIDE SERPL-SCNC: 104 MMOL/L (ref 98–107)
CO2 SERPL-SCNC: 31 MMOL/L (ref 21–31)
CREAT SERPL-MCNC: 1.21 MG/DL (ref 0.7–1.3)
ERYTHROCYTE [DISTWIDTH] IN BLOOD BY AUTOMATED COUNT: 11.9 % (ref 10–15)
GFR SERPL CREATININE-BSD FRML MDRD: 60 ML/MIN/{1.73_M2}
GLUCOSE SERPL-MCNC: 101 MG/DL (ref 70–105)
HCT VFR BLD AUTO: 42.7 % (ref 40–53)
HGB BLD-MCNC: 14.6 G/DL (ref 13.3–17.7)
MAGNESIUM SERPL-MCNC: 2 MG/DL (ref 1.9–2.7)
MCH RBC QN AUTO: 31.7 PG (ref 26.5–33)
MCHC RBC AUTO-ENTMCNC: 34.2 G/DL (ref 31.5–36.5)
MCV RBC AUTO: 93 FL (ref 78–100)
PLATELET # BLD AUTO: 175 10E9/L (ref 150–450)
POTASSIUM SERPL-SCNC: 4 MMOL/L (ref 3.5–5.1)
PROT SERPL-MCNC: 7.3 G/DL (ref 6.4–8.9)
PSA SERPL-ACNC: 2.97 NG/ML
RBC # BLD AUTO: 4.6 10E12/L (ref 4.4–5.9)
SODIUM SERPL-SCNC: 140 MMOL/L (ref 134–144)
WBC # BLD AUTO: 5.5 10E9/L (ref 4–11)

## 2020-08-07 PROCEDURE — 99396 PREV VISIT EST AGE 40-64: CPT | Mod: 25 | Performed by: INTERNAL MEDICINE

## 2020-08-07 PROCEDURE — 83735 ASSAY OF MAGNESIUM: CPT | Mod: ZL | Performed by: INTERNAL MEDICINE

## 2020-08-07 PROCEDURE — 80053 COMPREHEN METABOLIC PANEL: CPT | Mod: ZL | Performed by: INTERNAL MEDICINE

## 2020-08-07 PROCEDURE — 90471 IMMUNIZATION ADMIN: CPT | Performed by: INTERNAL MEDICINE

## 2020-08-07 PROCEDURE — G0103 PSA SCREENING: HCPCS | Mod: ZL | Performed by: INTERNAL MEDICINE

## 2020-08-07 PROCEDURE — 90715 TDAP VACCINE 7 YRS/> IM: CPT | Performed by: INTERNAL MEDICINE

## 2020-08-07 PROCEDURE — 93000 ELECTROCARDIOGRAM COMPLETE: CPT | Performed by: INTERNAL MEDICINE

## 2020-08-07 PROCEDURE — 85027 COMPLETE CBC AUTOMATED: CPT | Mod: ZL | Performed by: INTERNAL MEDICINE

## 2020-08-07 PROCEDURE — 36415 COLL VENOUS BLD VENIPUNCTURE: CPT | Mod: ZL | Performed by: INTERNAL MEDICINE

## 2020-08-07 RX ORDER — LOSARTAN POTASSIUM 50 MG/1
50 TABLET ORAL DAILY
Qty: 90 TABLET | Refills: 3 | Status: SHIPPED | OUTPATIENT
Start: 2020-08-07 | End: 2021-08-09

## 2020-08-07 RX ORDER — HYDROCODONE/ACETAMINOPHEN 5 MG-500MG
1 TABLET ORAL DAILY
COMMUNITY
End: 2022-09-06

## 2020-08-07 RX ORDER — LOSARTAN POTASSIUM 25 MG/1
50 TABLET ORAL DAILY
COMMUNITY
End: 2020-08-07

## 2020-08-07 RX ORDER — ASPIRIN 81 MG/1
81 TABLET ORAL DAILY
COMMUNITY

## 2020-08-07 ASSESSMENT — PAIN SCALES - GENERAL: PAINLEVEL: NO PAIN (0)

## 2020-08-07 ASSESSMENT — MIFFLIN-ST. JEOR: SCORE: 1760.14

## 2020-08-07 NOTE — PROGRESS NOTES
Chief Complaint   Patient presents with     Physical         HPI: Mr. Tatum is a 64 year old male who presents today for yearly physical.  He overall is feeling good.      He has a history of atrial fibrillation.  He overall has been feeling well since his ablation.  He is no longer on regular medications for heart rate or rhythm control.  He does continue on losartan daily for his blood pressure.  He denies any side effects from this.    He does have 1 acute concern today.  He has had issues with prostate symptoms.  He reports that he has had nocturia 1-2 times nightly.  He denies any significant change in this.  He has however had some pain with starting urination in the morning sometimes.  This is not daily but it is bothersome when it occurs.  He is not currently on any medications for BPH.    He is due for his tetanus shot.  He is due for diabetes screening.     History is discussed and updated on 8/7/2020 with patient.  It is current to the best of my knowledge as below.    Past Medical History:   Diagnosis Date     Atrial fibrillation (H) 10/31/2014     Calculus of kidney     passed spontaneous, age of 30     Enlarged prostate without lower urinary tract symptoms (luts) 09/26/2013     HTN (hypertension) 11/13/2019     Mixed hyperlipidemia 3/18/2019     Preglaucoma         Past Surgical History:   Procedure Laterality Date     CARDIOVERSION  02/2017     COLONOSCOPY  2017     EP ABLATION ATRIAL FLUTTER  01/28/2020     FINGER SURGERY      repair, left long finger injury     HERNIA REPAIR Right 1998     REPAIR RETINACULAR OPEN MEDIAL OR LATERAL      Retinal hole         Current Outpatient Medications   Medication Sig Dispense Refill     aspirin 81 MG EC tablet Take 81 mg by mouth daily       brimonidine (ALPHAGAN P) 0.1 % ophthalmic solution Apply 1 drop to eye       losartan (COZAAR) 50 MG tablet Take 1 tablet (50 mg) by mouth daily 90 tablet 3     Lutein 6 MG CAPS Take 1 capsule by mouth daily        "travoprost, LONI Free, (TRAVATAN Z) 0.004 % ophthalmic solution 1 drop At Bedtime         No Known Allergies     Family History   Problem Relation Age of Onset     Hypertension Mother      Brain Cancer Mother 63     Enlarged prostate Father      Peptic Ulcer Disease Father      Dementia Father      Other - See Comments Brother         detached retina     Skin Cancer Brother         melanoma type     Enlarged prostate Brother      Other - See Comments Brother         detached retina     Atrial fibrillation Brother      Osteopenia Sister        Family Status   Relation Name Status     Mo   at age 64        brain tumor     Fa   at age 93        Alzheimer's disease     Bro Montrell Alive     Bro Shmuel Alive     Sis Dora Alive        Social History     Tobacco Use     Smoking status: Never Smoker     Smokeless tobacco: Never Used   Substance Use Topics     Alcohol use: Yes     Alcohol/week: 4.2 standard drinks     Comment: glass of Wine, 5-6 times a week       Social History     Social History Narrative    , Tati, at the age of 41 and has two grown stepsons. Retired.  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  Wife teaches piano lessons.             ROS  GEN:-fevers/-chills/-night sweats/-wt change  NEURO: -headaches/-vision changes  EARS: -hearing changes/-tinnitus  NOSE: -drainage/-congestion  MOUTH/THROAT: - sore throat/-dysphagia/-sores  LUNGS: -sob/-cough  CARDIOVASCULAR: -cp/-palpitations  GI: -pain/-change in bowels/-bloody stools  : +change in bladder  HEMATOLOGIC/LYMPHATIC: -swollen nodes  SKIN: -rashes/-lesions  MSK/RHEUM: -joint pain/-swelling  NEURO: -weakness/-parasthesias  PSYCH:-depression/-anxiety     EXAM:   BP (!) 136/90 (BP Location: Right arm, Patient Position: Sitting, Cuff Size: Adult Regular)   Pulse 72   Temp 97.1  F (36.2  C) (Tympanic)   Resp 16   Ht 1.905 m (6' 3\")   Wt 88.5 kg (195 lb)   SpO2 99%   BMI 24.37 kg/m    Estimated body mass index is 24.37 " "kg/m  as calculated from the following:    Height as of this encounter: 1.905 m (6' 3\").    Weight as of this encounter: 88.5 kg (195 lb).      GEN: Vitals reviewed. Healthy appearing. Patient is in no acute distress. Cooperative with exam.  HEENT: Normocephalic atraumatic.  Eyes grossly normal to inspection.  No discharge or erythema, or obvious scleral/conjunctival abnormalities. EACs clear bilaterally, TM gray with normal landmarks.  NECK: Supple; no thyromegaly or masses noted.  No cervical or supraclavicular lymphadenopathy.  CV: Heart is slightly irregular today.  Rate is controlled.  LUNGS: No audible wheeze, cough, or visible cyanosis.  No visible retractions or increased work of breathing.  Lungs clear to auscultation bilaterally.    ABD:  Soft, nontender, and nondistended.  No rebound. Bowel sounds positive.  SKIN: Warm and dry to touch.  Visible skin clear. No significant rash, abnormal pigmentation or lesions.  EXT: No clubbing or cyanosis.  No peripheral edema.  NEURO: Alert and oriented to person, place, and time.  Cranial nerves II-XII grossly intact with no focal or lateralizing deficits.  Muscle tone normal.  Gait normal. No tremor.   MSK: ROM of upper and lower ext symmetric and full.  PSYCH: Mood is good.  Mentation appears normal, affect normal/bright, judgement and insight intact, normal speech and appearance well-groomed.       ASSESSMENT AND PLAN:    Essential hypertension  - Blood pressure today of (!) 144/94   Is not at the goal of <140/90 with mild exacerbation.  - Continue current regimen at this time.  Instructed to check BP at home and call if remains elevated.  - Cautioned patient to monitor with antibiotics, herbals and any OTC medications  - electrolytes and renal function done and okay  - losartan (COZAAR) 50 MG tablet  Dispense: 90 tablet; Refill: 3    Need for Tdap vaccination  - GH IMM-  TDAP VACCINE (BOOSTRIX )    Screening for diabetes mellitus  - Comprehensive Metabolic " Panel    Paroxysmal atrial fibrillation (H) - hx of cardioversion  -Labs checked today and stable.  EKG done does not show any recurrence  - Magnesium  - CBC W PLT No Diff    Screening PSA (prostate specific antigen)  - PSA Screen [facility]    Benign prostatic hyperplasia, unspecified whether lower urinary tract symptoms present  -We discussed options for follow-up including starting of medications, referral to urology or continued monitoring.  At this time he would like to continue to monitor symptoms.  PSA is a to be done today and is increased slightly more than expected.  Plan for repeat in 6 months.    Irregular heart beats  Due to irregular heart rate today EKG is obtained and reviewed personally.  It does not show atrial fibrillation but does show sinus arrhythmia  - EKG 12-lead, tracing only        Return in about 1 year (around 8/7/2021) for Annual Review.      SINA KING,    8/7/2020 9:43 AM    This document was prepared using voice generated softwear. While every attempt was made for accuracy, spelling and grammatical errors may exist.

## 2020-08-07 NOTE — NURSING NOTE
Patient presents to clinic today for annual physical. He is fasting except for one cup of sweetened coffee.  Had an ablation in January this year, so had several medication changes.  Medication reconciliation completed.  Toshia Carranza CMA([facility])..................8/7/2020   9:13 AM

## 2020-08-07 NOTE — NURSING NOTE
Immunization Documentation    Prior to Immunization administration, verified patients identity using patient's name and date of birth. Please see IMMUNIZATIONS  and order for additional information.  Patient / Parent instructed to remain in clinic for 15 minutes and report any adverse reaction to staff immediately.    Was entire vial of medication used? Yes  Vial/Syringe: Syringe    Toshia Carranza CMA([facility]).............8/7/2020  10:25 AM

## 2020-08-09 LAB — INTERPRETATION ECG - MUSE: NORMAL

## 2020-09-09 ENCOUNTER — ALLIED HEALTH/NURSE VISIT (OUTPATIENT)
Dept: FAMILY MEDICINE | Facility: OTHER | Age: 65
End: 2020-09-09
Attending: INTERNAL MEDICINE
Payer: COMMERCIAL

## 2020-09-09 DIAGNOSIS — Z23 NEED FOR ZOSTER VACCINATION: Primary | ICD-10-CM

## 2020-09-09 PROCEDURE — 90750 HZV VACC RECOMBINANT IM: CPT

## 2020-09-09 PROCEDURE — 90471 IMMUNIZATION ADMIN: CPT

## 2020-09-09 PROCEDURE — 96372 THER/PROPH/DIAG INJ SC/IM: CPT | Performed by: REGISTERED NURSE

## 2020-09-09 NOTE — PROGRESS NOTES
Immunization: Adult  Verified patient's name and . Stated reason for visit today is to receive Shingles vaccine(s). Denied any concerns with previous immunizations. Allergies reviewed.  Screening Questionnaire Adult Immunization completed (see below). VIS handout(s) reviewed and given to take home. Shingrix prepared and administered IM per standing order. Administration documented in IMMUNIZATIONS (see MIIC and order for further information). Instructed to wait in lobby for 15 minutes post-injection and notify RN immediately of any adverse reaction.     Screening Questionnaire for Adult Immunization    Are you sick today?   No   Do you have allergies to medications, food, a vaccine component, or latex?   No   Have you ever had a serious reaction after receiving a vaccination?   No   Have you received any vaccinations in the past 4 weeks?   No     Immunization questionnaire answers were all negative.      Amanda Carroll RN, BSN on 2020 at 1:28 PM

## 2020-10-12 DIAGNOSIS — I10 HYPERTENSION: Primary | ICD-10-CM

## 2020-10-14 ENCOUNTER — ALLIED HEALTH/NURSE VISIT (OUTPATIENT)
Dept: FAMILY MEDICINE | Facility: OTHER | Age: 65
End: 2020-10-14
Attending: INTERNAL MEDICINE
Payer: COMMERCIAL

## 2020-10-14 DIAGNOSIS — Z23 NEED FOR PROPHYLACTIC VACCINATION AND INOCULATION AGAINST INFLUENZA: Primary | ICD-10-CM

## 2020-10-14 PROCEDURE — 90471 IMMUNIZATION ADMIN: CPT

## 2020-10-14 PROCEDURE — 90686 IIV4 VACC NO PRSV 0.5 ML IM: CPT

## 2020-10-19 DIAGNOSIS — I10 HYPERTENSION: ICD-10-CM

## 2020-10-19 LAB
CREAT SERPL-MCNC: 1.36 MG/DL (ref 0.7–1.3)
GFR SERPL CREATININE-BSD FRML MDRD: 53 ML/MIN/{1.73_M2}
POTASSIUM SERPL-SCNC: 3.8 MMOL/L (ref 3.5–5.1)
SODIUM SERPL-SCNC: 136 MMOL/L (ref 134–144)

## 2020-10-19 PROCEDURE — 84132 ASSAY OF SERUM POTASSIUM: CPT | Mod: ZL | Performed by: INTERNAL MEDICINE

## 2020-10-19 PROCEDURE — 36415 COLL VENOUS BLD VENIPUNCTURE: CPT | Mod: ZL | Performed by: INTERNAL MEDICINE

## 2020-10-19 PROCEDURE — 82565 ASSAY OF CREATININE: CPT | Mod: ZL | Performed by: INTERNAL MEDICINE

## 2020-10-19 PROCEDURE — 84295 ASSAY OF SERUM SODIUM: CPT | Mod: ZL | Performed by: INTERNAL MEDICINE

## 2020-11-11 ENCOUNTER — ALLIED HEALTH/NURSE VISIT (OUTPATIENT)
Dept: FAMILY MEDICINE | Facility: OTHER | Age: 65
End: 2020-11-11
Attending: INTERNAL MEDICINE
Payer: COMMERCIAL

## 2020-11-11 DIAGNOSIS — Z23 NEED FOR SHINGLES VACCINE: Primary | ICD-10-CM

## 2020-11-11 PROCEDURE — 90750 HZV VACC RECOMBINANT IM: CPT

## 2020-11-11 PROCEDURE — 90471 IMMUNIZATION ADMIN: CPT

## 2020-11-11 NOTE — PROGRESS NOTES
Immunization: Adult  Verified patient's name and . Stated reason for visit today is to receive Shingrix vaccine(s). Denied any concerns with previous immunizations. Allergies reviewed.  Screening Questionnaire Adult Immunization completed (see below). VIS handout(s) reviewed and given to take home. Shingrix prepared and administered IM per standing order. Administration documented in IMMUNIZATIONS (see MIIC and order for further information). Instructed to wait in lobby for 15 minutes post-injection and notify RN immediately of any adverse reaction.     Screening Questionnaire for Adult Immunization    Are you sick today?   No   Do you have allergies to vaccines or vaccine components?   No   Have you ever had a serious reaction after receiving a vaccination?   No   Have you received any vaccinations in the past 4 weeks?   No     Immunization questionnaire answers were all negative.    Kanu Souza RN, BSN  ....................  2020   12:58 PM

## 2020-12-30 ENCOUNTER — VIRTUAL VISIT (OUTPATIENT)
Dept: FAMILY MEDICINE | Facility: OTHER | Age: 65
End: 2020-12-30
Attending: PHYSICIAN ASSISTANT
Payer: MEDICARE

## 2020-12-30 DIAGNOSIS — Z20.822 COVID-19 RULED OUT: Primary | ICD-10-CM

## 2020-12-30 PROCEDURE — 99441 PR PHYSICIAN TELEPHONE EVALUATION 5-10 MIN: CPT | Mod: 95 | Performed by: PHYSICIAN ASSISTANT

## 2020-12-30 NOTE — PROGRESS NOTES
"Darron Tatum is a 65 year old male who is being evaluated via a billable telephone visit.      The patient has been notified of following:     \"This telephone visit will be conducted via a call between you and your physician/provider. We have found that certain health care needs can be provided without the need for a physical exam.  This service lets us provide the care you need with a short phone conversation.  If a prescription is necessary we can send it directly to your pharmacy.  If lab work is needed we can place an order for that and you can then stop by our lab to have the test done at a later time.    Telephone visits are billed at different rates depending on your insurance coverage. During this emergency period, for some insurers they may be billed the same as an in-person visit.  Please reach out to your insurance provider with any questions.    If during the course of the call the physician/provider feels a telephone visit is not appropriate, you will not be charged for this service.\"    Patient has given verbal consent for Telephone visit?  Yes    What phone number would you like to be contacted at? [phone]    How would you like to obtain your AVS? Luna [place]     Darron Tatum is a 65 year old male who presents via phone visit today for the following health issues:    HPI     Patient is contacted via telephone for consideration of COVID-19 testing. States he was in prolonged close contact for about one hour with his son in the [facility], socially distanced outside while wearing a mask over [place]. Son tested positive for COVID on 12/26/2020. Patient is currently asymptomatic. No fever/chills, cough, sore throat, shortness of breath, wheezing, muscle or body aches, headaches, GI symptoms, rash, changes in taste or smell. Patient does have a history of PAF that has been well controlled following cardioverion. No other cardiac or pulmonary history.          PAST MEDICAL HISTORY:   Past " Medical History:   Diagnosis Date     Atrial fibrillation (H) 10/31/2014     Calculus of kidney     passed spontaneous, age of 30     Enlarged prostate without lower urinary tract symptoms (luts) 09/26/2013     HTN (hypertension) 11/13/2019     Mixed hyperlipidemia 3/18/2019     Preglaucoma        PAST SURGICAL HISTORY:   Past Surgical History:   Procedure Laterality Date     CARDIOVERSION  02/2017     COLONOSCOPY  2017     EP ABLATION ATRIAL FLUTTER  01/28/2020     FINGER SURGERY      repair, left long finger injury     HERNIA REPAIR Right 1998     REPAIR RETINACULAR OPEN MEDIAL OR LATERAL      Retinal hole       FAMILY HISTORY:   Family History   Problem Relation Age of Onset     Hypertension Mother      Brain Cancer Mother 63     Enlarged prostate Father      Peptic Ulcer Disease Father      Dementia Father      Other - See Comments Brother         detached retina     Skin Cancer Brother         melanoma type     Enlarged prostate Brother      Other - See Comments Brother         detached retina     Atrial fibrillation Brother      Osteopenia Sister        SOCIAL HISTORY:   Social History     Tobacco Use     Smoking status: Never Smoker     Smokeless tobacco: Never Used   Substance Use Topics     Alcohol use: Yes     Alcohol/week: 4.2 standard drinks     Comment: glass of Wine, 5-6 times a week      No Known Allergies  Current Outpatient Medications   Medication     aspirin 81 MG EC tablet     brimonidine (ALPHAGAN P) 0.1 % ophthalmic solution     losartan (COZAAR) 50 MG tablet     Lutein 6 MG CAPS     travoprost, LONI Free, (TRAVATAN Z) 0.004 % ophthalmic solution     No current facility-administered medications for this visit.          Review of Systems   Constitutional, HEENT, cardiovascular, pulmonary, gi and gu systems are negative, except as otherwise noted.       Objective          Vitals:  No vitals were obtained today due to virtual visit.    healthy, alert and no distress  PSYCH: Alert and oriented times  3; coherent speech, normal   rate and volume, able to articulate logical thoughts, able   to abstract reason, no tangential thoughts, no hallucinations   or delusions  His affect is normal  RESP: No cough, no audible wheezing, able to talk in full sentences  Remainder of exam unable to be completed due to telephone visits            Assessment/Plan:  1. COVID-19 ruled out      Patient meets criteria for COVID-19 testing. They are informed to self quarantine until results are available. They have been provided information to complete curbside testing. Will notify with results. If positive, patient is to self-quarantine at home for 14 days.     Phone call duration:  5 minutes    Rosalba Hendricks PA-C on 12/30/2020 at 8:17 AM

## 2020-12-30 NOTE — NURSING NOTE
Patient was exposed by son whom tested positive on 12/26/2020. They were together for about an hour and socially distanced and masks were worn. Would like COVID test.  Malini Zamora LPN ....................  12/30/2020   8:14 AM

## 2020-12-30 NOTE — PATIENT INSTRUCTIONS
"Discharge Instructions for COVID-19 Patients  You have--or may have--COVID-19. Please follow the instructions listed below.   If you have a weakened immune system, discuss with your doctor any other actions you need to take.  How can I protect others?  If you have symptoms (fever, cough, body aches or trouble breathing):    Stay home and away from others (self-isolate) until:  ? At least 10 days have passed since your symptoms started, And   ? You've had no fever--and no medicine that reduces fever--for 1 full day (24 hours), And    ? Your other symptoms have resolved (gotten better).  If you don't show symptoms, but testing showed that you have COVID-19:    Stay home and away from others (self-isolate). Follow the tips under \"How do I self-isolate?\" below for 10 days (20 days if you have a weak immune system).    You don't need to be retested for COVID-19 before going back to school or work. As long as you're fever-free and feeling better, you can go back to school, work and other activities after waiting the 10 or 20 days.   How do I self-isolate?    Stay in your own room, even for meals. Use your own bathroom if you can.    Stay away from others in your home. No hugging, kissing or shaking hands. No visitors.    Don't go to work, school or anywhere else.    Clean \"high touch\" surfaces often (doorknobs, counters, handles). Use household cleaning spray or wipes. You'll find a full list of  on the EPA website: www.epa.gov/pesticide-registration/list-n-disinfectants-use-against-sars-cov-2.    Cover your mouth and nose with a mask or other face covering to avoid spreading germs.    Wash your hands and face often. Use soap and water.    Caregivers in these groups are at risk for severe illness due to COVID-19:  ? People 65 years and older  ? People who live in a nursing home or long-term care facility  ? People with chronic disease (lung, heart, cancer, diabetes, kidney, liver, immunologic)  ? People who have a " weakened immune system, including those who:    Are in cancer treatment    Take medicine that weakens the immune system, such as corticosteroids    Had a bone marrow or organ transplant    Have an immune deficiency    Have poorly controlled HIV or AIDS    Are obese (body mass index of 40 or higher)    Smoke regularly    Caregivers should wear gloves while washing dishes, handling laundry and cleaning bedrooms and bathrooms.    Use caution when washing and drying laundry: Don't shake dirty laundry and use the warmest water setting that you can.    For more tips on managing your health at home, go to www.cdc.gov/coronavirus/2019-ncov/downloads/10Things.pdf.  How can I take care of myself at home?  1. Get lots of rest. Drink extra fluids (unless a doctor has told you not to).    2. Take Tylenol (acetaminophen) for fever or pain. If you have liver or kidney problems, ask your family doctor if it's okay to take Tylenol.     Adults can take either:  ? 650 mg (two 325 mg pills) every 4 to 6 hours, or   ? 1,000 mg (two 500 mg pills) every 8 hours as needed.  ? Note: Don't take more than 3,000 mg in one day. Acetaminophen is found in many medicines (both prescribed and over-the-counter medicines). Read all labels to be sure you don't take too much.   For children, check the Tylenol bottle for the right dose. The dose is based on the child's age or weight.  3. If you have other health problems (like cancer, heart failure, an organ transplant or severe kidney disease): Call your specialty clinic if you don't feel better in the next 2 days.    4. Know when to call 911. Emergency warning signs include:  ? Trouble breathing or shortness of breath  ? Pain or pressure in the chest that doesn't go away  ? Feeling confused like you haven't felt before, or not being able to wake up  ? Bluish-colored lips or face    5. Your doctor may have prescribed a blood thinner medicine. Follow their instructions.  Where can I get more  information?    [facility] - About COVID-19: [email or web address]    CDC - What to Do If You're Sick: www.cdc.gov/coronavirus/2019-ncov/about/steps-when-sick.html    CDC - Ending Home Isolation: www.cdc.gov/coronavirus/2019-ncov/hcp/disposition-in-home-patients.html    CDC - Caring for Someone: www.cdc.gov/coronavirus/2019-ncov/if-you-are-sick/care-for-someone.html    [facility] - Interim Guidance for Hospital Discharge to Home: www.health.[facility].[place]./diseases/coronavirus/hcp/hospdischarge.pdf    [facility] clinical trials (COVID-19 research studies): [email or web address]-clinical-trials    Below are the COVID-19 hotlines at the [place] Department of Health ([facility]). Interpreters are available.  ? For health questions: Call 787-099-0889 or 1-513.596.2257 (7 a.m. to 7 p.m.)  ? For questions about schools and childcare: Call 981-053-9247 or 1-640.151.3157 (7 a.m. to 7 p.m.)    For informational purposes only. Not to replace the advice of your health care provider. Clinically reviewed by the Infection Prevention Team. Copyright   2020 [employer]. All rights reserved. [employer] 160016 - REV 08/04/20.

## 2021-01-01 ENCOUNTER — ALLIED HEALTH/NURSE VISIT (OUTPATIENT)
Dept: FAMILY MEDICINE | Facility: OTHER | Age: 66
End: 2021-01-01
Attending: PHYSICIAN ASSISTANT
Payer: MEDICARE

## 2021-01-01 DIAGNOSIS — Z20.822 COVID-19 RULED OUT: ICD-10-CM

## 2021-01-01 PROCEDURE — U0005 INFEC AGEN DETEC AMPLI PROBE: HCPCS | Mod: ZL | Performed by: PHYSICIAN ASSISTANT

## 2021-01-01 PROCEDURE — U0003 INFECTIOUS AGENT DETECTION BY NUCLEIC ACID (DNA OR RNA); SEVERE ACUTE RESPIRATORY SYNDROME CORONAVIRUS 2 (SARS-COV-2) (CORONAVIRUS DISEASE [COVID-19]), AMPLIFIED PROBE TECHNIQUE, MAKING USE OF HIGH THROUGHPUT TECHNOLOGIES AS DESCRIBED BY CMS-2020-01-R: HCPCS | Mod: ZL | Performed by: PHYSICIAN ASSISTANT

## 2021-01-01 PROCEDURE — C9803 HOPD COVID-19 SPEC COLLECT: HCPCS

## 2021-01-02 LAB
LABORATORY COMMENT REPORT: NORMAL
SARS-COV-2 RNA SPEC QL NAA+PROBE: NEGATIVE
SARS-COV-2 RNA SPEC QL NAA+PROBE: NORMAL
SPECIMEN SOURCE: NORMAL
SPECIMEN SOURCE: NORMAL

## 2021-01-22 ENCOUNTER — MYC MEDICAL ADVICE (OUTPATIENT)
Dept: INTERNAL MEDICINE | Facility: OTHER | Age: 66
End: 2021-01-22

## 2021-02-22 ENCOUNTER — MYC MEDICAL ADVICE (OUTPATIENT)
Dept: INTERNAL MEDICINE | Facility: OTHER | Age: 66
End: 2021-02-22

## 2021-02-22 DIAGNOSIS — Z12.5 SCREENING FOR PROSTATE CANCER: Primary | ICD-10-CM

## 2021-02-22 DIAGNOSIS — L98.9 SKIN LESION: ICD-10-CM

## 2021-03-02 DIAGNOSIS — Z12.5 SCREENING FOR PROSTATE CANCER: ICD-10-CM

## 2021-03-02 LAB — PSA SERPL-ACNC: 2.23 NG/ML

## 2021-03-02 PROCEDURE — 36415 COLL VENOUS BLD VENIPUNCTURE: CPT | Mod: ZL | Performed by: INTERNAL MEDICINE

## 2021-03-02 PROCEDURE — G0103 PSA SCREENING: HCPCS | Mod: ZL | Performed by: INTERNAL MEDICINE

## 2021-03-09 ENCOUNTER — OFFICE VISIT (OUTPATIENT)
Dept: SURGERY | Facility: OTHER | Age: 66
End: 2021-03-09
Attending: SURGERY
Payer: MEDICARE

## 2021-03-09 VITALS
BODY MASS INDEX: 25 KG/M2 | SYSTOLIC BLOOD PRESSURE: 130 MMHG | TEMPERATURE: 98 F | DIASTOLIC BLOOD PRESSURE: 70 MMHG | HEART RATE: 85 BPM | RESPIRATION RATE: 16 BRPM | WEIGHT: 200 LBS

## 2021-03-09 DIAGNOSIS — L98.9 SKIN LESION: Primary | ICD-10-CM

## 2021-03-09 PROCEDURE — 88305 TISSUE EXAM BY PATHOLOGIST: CPT

## 2021-03-09 PROCEDURE — G0463 HOSPITAL OUTPT CLINIC VISIT: HCPCS

## 2021-03-09 PROCEDURE — 11642 EXC F/E/E/N/L MAL+MRG 1.1-2: CPT | Performed by: SURGERY

## 2021-03-09 PROCEDURE — 11642 EXC F/E/E/N/L MAL+MRG 1.1-2: CPT

## 2021-03-09 ASSESSMENT — PAIN SCALES - GENERAL: PAINLEVEL: NO PAIN (0)

## 2021-03-09 NOTE — PATIENT INSTRUCTIONS
Your incision was closed with stitches that will dissolve.    It is ok to get the incision wet in the shower on the day after your procedure.     Don't soak in a tub, pool or lake for 1 week.   If you have concerns, please call.

## 2021-03-09 NOTE — NURSING NOTE
"Chief Complaint   Patient presents with     Derm Problem     lesion under left ear       Initial /70 (BP Location: Left arm, Patient Position: Sitting, Cuff Size: Adult Large)   Pulse 85   Temp 98  F (36.7  C) (Tympanic)   Resp 16   Wt 90.7 kg (200 lb)   BMI 25.00 kg/m   Estimated body mass index is 25 kg/m  as calculated from the following:    Height as of 8/7/20: 1.905 m (6' 3\").    Weight as of this encounter: 90.7 kg (200 lb).  Medication Reconciliation: complete    Lula Walker LPN  "

## 2021-03-09 NOTE — PROGRESS NOTES
Procedure Note      Pre/Post Operative Diagnosis:  Left cheek skin lesion     Procedure:   Removal of Left cheek skin lesion     Surgeon: Oswald Noe MD    Local Anesthesia: 1% lidocaine with 0.25% Marcaine with epinephrine    Indication for the procedure:  This is a 65 year old male  patient referred by Alberta Fan with a Left cheek skin lesion.       After explaining the risks to include bleeding, infection, recurrence or need for reexcision, and scarring the patientwished to proceed.    Procedure:   The area was prepped and draped in usual sterile fashion with ChloraPrep.   After, adequate localanesthesia, an 1.9 cm X 0.8 cm elliptical skin incision was made to encompass the  0.7 cm lesion with margins.  The skin was closed with 4-0 Monocryl and Dermabond.      Plan:  The patient will be called to review the pathology. Patient will follow up if there any problems with the wound including redness or drainage.      Oswald Noe MD....................  3/9/2021   1:10 PM

## 2021-03-09 NOTE — NURSING NOTE
"Chief Complaint   Patient presents with     Derm Problem     lesion under left ear       Initial /70 (BP Location: Left arm, Patient Position: Sitting, Cuff Size: Adult Large)   Pulse 85   Temp 98  F (36.7  C) (Tympanic)   Resp 16   Wt 90.7 kg (200 lb)   BMI 25.00 kg/m   Estimated body mass index is 25 kg/m  as calculated from the following:    Height as of 8/7/20: 1.905 m (6' 3\").    Weight as of this encounter: 90.7 kg (200 lb).  Medication Reconciliation: complete    Lula Walker LPN     TIMEOUT  [place] Protocol    A. Pre-procedure verification complete     1-relevant information / documentation available, reviewed and properly matched to the patient; 2-consent accurate and complete, 3-equipment and supplies available    B. Site marking complete     Site marked if not in continuous attendance with patient    C. TIME OUT completed           Time Out was conducted just prior to starting procedure to verify the eight required elements: 1-patient identity, 2-consent accurate and complete, 3-position, 4-correct side/site marked (if applicable), 5-procedure, 6-relevant images / results properly labeled and displayed (if applicable), 7-antibiotics / irrigation fluids (if applicable), 8-safety precautions.  "

## 2021-03-22 ENCOUNTER — MYC MEDICAL ADVICE (OUTPATIENT)
Dept: SURGERY | Facility: OTHER | Age: 66
End: 2021-03-22

## 2021-03-22 ENCOUNTER — TELEPHONE (OUTPATIENT)
Dept: SURGERY | Facility: OTHER | Age: 66
End: 2021-03-22

## 2021-07-22 ENCOUNTER — MYC MEDICAL ADVICE (OUTPATIENT)
Dept: INTERNAL MEDICINE | Facility: OTHER | Age: 66
End: 2021-07-22

## 2021-07-22 DIAGNOSIS — L98.9 SKIN LESION: Primary | ICD-10-CM

## 2021-07-22 NOTE — TELEPHONE ENCOUNTER
Please schedule patient with Dr. Oswald Noe for removal of skin lesion. Thanks.    SERGIO Porras, AGNP-C  Internal Medicine  07/22/2021 5:24 PM

## 2021-07-23 NOTE — TELEPHONE ENCOUNTER
Patient has been scheduled for August 3, 2021 with Dr. Noe.     Sonja Tolbert on 7/23/2021 at 8:59 AM

## 2021-08-09 ENCOUNTER — OFFICE VISIT (OUTPATIENT)
Dept: INTERNAL MEDICINE | Facility: OTHER | Age: 66
End: 2021-08-09
Attending: INTERNAL MEDICINE
Payer: MEDICARE

## 2021-08-09 VITALS
DIASTOLIC BLOOD PRESSURE: 84 MMHG | WEIGHT: 200 LBS | SYSTOLIC BLOOD PRESSURE: 132 MMHG | HEIGHT: 75 IN | TEMPERATURE: 97 F | BODY MASS INDEX: 24.87 KG/M2 | HEART RATE: 73 BPM | RESPIRATION RATE: 16 BRPM | OXYGEN SATURATION: 99 %

## 2021-08-09 DIAGNOSIS — Z00.00 ENCOUNTER FOR MEDICARE ANNUAL WELLNESS EXAM: Primary | ICD-10-CM

## 2021-08-09 DIAGNOSIS — N18.31 STAGE 3A CHRONIC KIDNEY DISEASE (H): ICD-10-CM

## 2021-08-09 DIAGNOSIS — H61.91 LESION OF SKIN OF RIGHT EAR: ICD-10-CM

## 2021-08-09 DIAGNOSIS — M25.562 CHRONIC PAIN OF LEFT KNEE: ICD-10-CM

## 2021-08-09 DIAGNOSIS — Z23 NEED FOR PNEUMOCOCCAL VACCINATION: ICD-10-CM

## 2021-08-09 DIAGNOSIS — Z13.1 SCREENING FOR DIABETES MELLITUS: ICD-10-CM

## 2021-08-09 DIAGNOSIS — D69.6 THROMBOCYTOPENIA (H): ICD-10-CM

## 2021-08-09 DIAGNOSIS — Z13.220 SCREENING FOR HYPERLIPIDEMIA: ICD-10-CM

## 2021-08-09 DIAGNOSIS — Z13.6 SCREENING FOR AAA (ABDOMINAL AORTIC ANEURYSM): ICD-10-CM

## 2021-08-09 DIAGNOSIS — I10 ESSENTIAL HYPERTENSION: ICD-10-CM

## 2021-08-09 DIAGNOSIS — I48.0 PAROXYSMAL ATRIAL FIBRILLATION (H): ICD-10-CM

## 2021-08-09 DIAGNOSIS — Z12.5 SCREENING FOR PROSTATE CANCER: ICD-10-CM

## 2021-08-09 DIAGNOSIS — G89.29 CHRONIC PAIN OF LEFT KNEE: ICD-10-CM

## 2021-08-09 PROBLEM — N18.30 CHRONIC KIDNEY DISEASE, STAGE 3 (H): Status: ACTIVE | Noted: 2021-08-09

## 2021-08-09 LAB
ALBUMIN SERPL-MCNC: 4.2 G/DL (ref 3.5–5.7)
ALP SERPL-CCNC: 52 U/L (ref 34–104)
ALT SERPL W P-5'-P-CCNC: 13 U/L (ref 7–52)
ANION GAP SERPL CALCULATED.3IONS-SCNC: 6 MMOL/L (ref 3–14)
AST SERPL W P-5'-P-CCNC: 19 U/L (ref 13–39)
BILIRUB SERPL-MCNC: 0.6 MG/DL (ref 0.3–1)
BUN SERPL-MCNC: 23 MG/DL (ref 7–25)
CALCIUM SERPL-MCNC: 9.6 MG/DL (ref 8.6–10.3)
CHLORIDE BLD-SCNC: 101 MMOL/L (ref 98–107)
CHOLEST SERPL-MCNC: 176 MG/DL
CO2 SERPL-SCNC: 31 MMOL/L (ref 21–31)
CREAT SERPL-MCNC: 1.38 MG/DL (ref 0.7–1.3)
ERYTHROCYTE [DISTWIDTH] IN BLOOD BY AUTOMATED COUNT: 11.9 % (ref 10–15)
FASTING STATUS PATIENT QL REPORTED: ABNORMAL
GFR SERPL CREATININE-BSD FRML MDRD: 53 ML/MIN/1.73M2
GLUCOSE BLD-MCNC: 92 MG/DL (ref 70–105)
HCT VFR BLD AUTO: 40 % (ref 40–53)
HDLC SERPL-MCNC: 39 MG/DL (ref 23–92)
HGB BLD-MCNC: 13.8 G/DL (ref 13.3–17.7)
LDLC SERPL CALC-MCNC: 113 MG/DL
MAGNESIUM SERPL-MCNC: 2.2 MG/DL (ref 1.9–2.7)
MCH RBC QN AUTO: 32 PG (ref 26.5–33)
MCHC RBC AUTO-ENTMCNC: 34.5 G/DL (ref 31.5–36.5)
MCV RBC AUTO: 93 FL (ref 78–100)
NONHDLC SERPL-MCNC: 137 MG/DL
PLATELET # BLD AUTO: 187 10E3/UL (ref 150–450)
POTASSIUM BLD-SCNC: 4.2 MMOL/L (ref 3.5–5.1)
PROT SERPL-MCNC: 7.2 G/DL (ref 6.4–8.9)
PSA SERPL-MCNC: 2.65 UG/L (ref 0–4)
RBC # BLD AUTO: 4.31 10E6/UL (ref 4.4–5.9)
SODIUM SERPL-SCNC: 138 MMOL/L (ref 134–144)
TRIGL SERPL-MCNC: 118 MG/DL
WBC # BLD AUTO: 6 10E3/UL (ref 4–11)

## 2021-08-09 PROCEDURE — 83735 ASSAY OF MAGNESIUM: CPT | Mod: ZL | Performed by: INTERNAL MEDICINE

## 2021-08-09 PROCEDURE — 85027 COMPLETE CBC AUTOMATED: CPT | Mod: ZL | Performed by: INTERNAL MEDICINE

## 2021-08-09 PROCEDURE — 80053 COMPREHEN METABOLIC PANEL: CPT | Mod: ZL | Performed by: INTERNAL MEDICINE

## 2021-08-09 PROCEDURE — G0463 HOSPITAL OUTPT CLINIC VISIT: HCPCS

## 2021-08-09 PROCEDURE — G0103 PSA SCREENING: HCPCS | Mod: ZL | Performed by: INTERNAL MEDICINE

## 2021-08-09 PROCEDURE — 36415 COLL VENOUS BLD VENIPUNCTURE: CPT | Mod: ZL | Performed by: INTERNAL MEDICINE

## 2021-08-09 PROCEDURE — G0402 INITIAL PREVENTIVE EXAM: HCPCS | Performed by: INTERNAL MEDICINE

## 2021-08-09 PROCEDURE — 80061 LIPID PANEL: CPT | Mod: ZL | Performed by: INTERNAL MEDICINE

## 2021-08-09 PROCEDURE — G0009 ADMIN PNEUMOCOCCAL VACCINE: HCPCS

## 2021-08-09 PROCEDURE — G0463 HOSPITAL OUTPT CLINIC VISIT: HCPCS | Mod: 25

## 2021-08-09 PROCEDURE — 99213 OFFICE O/P EST LOW 20 MIN: CPT | Mod: 25 | Performed by: INTERNAL MEDICINE

## 2021-08-09 RX ORDER — TIMOLOL MALEATE 5 MG/ML
1 SOLUTION/ DROPS OPHTHALMIC 2 TIMES DAILY
COMMUNITY
Start: 2021-06-05 | End: 2023-09-06

## 2021-08-09 RX ORDER — CHLORTHALIDONE 25 MG/1
12.5 TABLET ORAL DAILY
Qty: 45 TABLET | Refills: 3 | Status: SHIPPED | OUTPATIENT
Start: 2021-08-09 | End: 2022-09-06

## 2021-08-09 RX ORDER — CHLORTHALIDONE 25 MG/1
12.5 TABLET ORAL DAILY
COMMUNITY
End: 2021-08-09

## 2021-08-09 RX ORDER — LOSARTAN POTASSIUM 50 MG/1
50 TABLET ORAL DAILY
Qty: 90 TABLET | Refills: 3 | Status: SHIPPED | OUTPATIENT
Start: 2021-08-09 | End: 2022-09-06

## 2021-08-09 ASSESSMENT — MIFFLIN-ST. JEOR: SCORE: 1777.82

## 2021-08-09 ASSESSMENT — PAIN SCALES - GENERAL: PAINLEVEL: NO PAIN (0)

## 2021-08-09 NOTE — NURSING NOTE
Patient presents to clinic today for annual Medicare wellness visit, Welcome to Medicare.  Medication reconciliation completed.    FOOD SECURITY SCREENING QUESTIONS  Hunger Vital Signs:  Within the past 12 months we worried whether our food would run out before we got money to buy more. Never  Within the past 12 months the food we bought just didn't last and we didn't have money to get more. Never    Toshia Carranza CMA([facility])..................8/9/2021   10:16 AM      Immunization Documentation    Prior to Immunization administration, verified patients identity using patient's name and date of birth. Please see IMMUNIZATIONS  and order for additional information.  Patient / Parent instructed to remain in clinic for 15 minutes and report any adverse reaction to staff immediately.    Was entire vial of medication used? Yes  Vial/Syringe: Syringe    Toshia Carranza CMA([facility]).............8/9/2021  11:07 AM

## 2021-08-09 NOTE — PATIENT INSTRUCTIONS
Patient Education   Personalized Prevention Plan  You are due for the preventive services outlined below.  Your care team is available to assist you in scheduling these services.  If you have already completed any of these items, please share that information with your care team to update in your medical record.  Health Maintenance Due   Topic Date Due     FALL RISK ASSESSMENT  12/07/2020     Pneumococcal Vaccine (1 of 1 - PPSV23) Never done     AORTIC ANEURYSM SCREENING (SYSTEM ASSIGNED)  Never done     Annual Wellness Visit  08/07/2021

## 2021-08-09 NOTE — PROGRESS NOTES
"Medicare Wellness Visit   Mr. Tatum is a 65 year old male who presents today who presents for Preventive Visit.      Are you in the first 12 months of your Medicare coverage?  Yes,  Visual Acuity:  Just had eye exam at Ophthalmologist's office a couple weeks ago    Health Risk Assessment     Do you feel safe in your environment? Yes    Physical Health:    In general, how would you rate your overall physical health? excellent    Outside of work, how many days during the week do you exercise? 4-5 days/week    Outside of work, approximately how many minutes a day do you exercise?greater than 60 minutes    If you drink alcohol do you typically have >3 drinks per day or >7 drinks per week? No    Do you usually eat at least 4 servings of fruit and vegetables a day, include whole grains & fiber and avoid regularly eating high fat or \"junk\" foods? Yes    Do you have any problems taking medications regularly?  No    Do you have any side effects from medications? none    Needs assistance for the following daily activities: no assistance needed    Which of the following safety concerns are present in your home?  lack of grab bars in the bathroom     Hearing impairment: No    In the past 6 months, have you been bothered by leaking of urine? no      Screening     Fall risk  Fallen 2 or more times in the past year?: No  Any fall with injury in the past year?: No    Cognitive Screening   1) Repeat 3 items (Leader, Season, Table)    2) Clock draw: NORMAL  3) 3 item recall: Recalls 3 objects  Results: 3 items recalled: COGNITIVE IMPAIRMENT LESS LIKELY    Mini-CogTM Copyright [employer] Licensed by the author for use in [facility]; reprinted with permission (robi@.[email or web address]). All rights reserved.      Do you have sleep apnea, excessive snoring or daytime drowsiness?: no, snores some, and sometimes takes nap during the day    Breast cancer screening: n/a    Regular dental visits: every six months    Eye exam with in 2 years: " just a couple of weeks ago      End of Life Planning     Have you ever done Advance Care Planning? (For example, a Health Directive, POLST, or a discussion with a medical provider or your loved ones about your wishes): Yes, advance care planning is on file.      End of Life Planning:  Patient currently has an advanced directive: Yes.  Practitioner is supportive of decision.        Medical Record Update     Reviewed and updated as needed this visit by clinical staff  Tobacco  Allergies  Meds  Med Hx  Surg Hx  Fam Hx  Soc Hx      Reviewed and updated as needed this visit by Provider  Tobacco  Allergies  Meds  Problems  Med Hx  Surg Hx  Fam Hx          Current Outpatient Medications   Medication     aspirin 81 MG EC tablet     chlorthalidone (HYGROTON) 25 MG tablet     losartan (COZAAR) 50 MG tablet     Lutein 6 MG CAPS     timolol maleate (TIMOPTIC) 0.5 % ophthalmic solution     travoprost, BAK Free, (TRAVATAN Z) 0.004 % ophthalmic solution     No current facility-administered medications for this visit.          Current providers sharing in care for this patient include:   Patient Care Team:  Alberta Fan DO as PCP - General (Internal Medicine)  Alberta Fan DO as Assigned PCP  Oswald Noe MD as Assigned Surgical Provider     Subjective:   He is doing well overall.      He is due for his medications.  He denies any side effects from his medications that he is aware of.  His blood pressure overall has been well controlled.  He is due for recheck of his renal function.    He was noted in the past to have thrombocytopenia.  He is due for recheck of this also.  He has several screening tests that he is due for.    He is interested in seeing a dermatologist.  He does have an erythematous lesion of his right ear.    He continues have some issues with left knee pain.  He has taken some Tylenol as needed for this.    Review of Systems     Denies any fevers, chills, SOB, chest pain, increased lower  "extremity edema, changes in bowel or bladder or other concerns.       OBJECTIVE:     Vitals:    08/09/21 1014   BP: 132/84   BP Location: Right arm   Patient Position: Sitting   Cuff Size: Adult Regular   Pulse: 73   Resp: 16   Temp: 97  F (36.1  C)   TempSrc: Tympanic   SpO2: 99%   Weight: 90.7 kg (200 lb)   Height: 1.905 m (6' 3\")        Estimated body mass index is 25 kg/m  as calculated from the following:    Height as of this encounter: 1.905 m (6' 3\").    Weight as of this encounter: 90.7 kg (200 lb).     Physical Exam  GEN: Vitals reviewed. Healthy appearing. Patient is in no acute distress. Cooperative with exam.  HEENT: Normocephalic atraumatic.  Eyes grossly normal to inspection.  No discharge or erythema, or obvious scleral/conjunctival abnormalities.   CV: Heart regular in rate and rhythm with no murmur.    LUNGS: No audible wheeze, cough, or visible cyanosis.  No visible retractions or increased work of breathing.  Lungs clear to auscultation bilaterally.    ABD:  Nondistended  SKIN: Warm and dry to touch.  Visible skin clear. No significant rash, abnormal pigmentation or lesions.  EXT: No clubbing or cyanosis.  No peripheral edema.  NEURO: Alert and oriented to person, place, and time.  Cranial nerves II-XII grossly intact with no focal or lateralizing deficits.  Muscle tone normal.  Gait normal. No tremor.   MSK: ROM of upper and lower ext symmetric and full.  PSYCH: Mood is good.  Mentation appears normal, affect normal/bright, judgement and insight intact, normal speech and appearance well-groomed.      Labs reviewed in EPIC    ASSESSMENT / PLAN:     Encounter for Medicare annual wellness exam    Essential hypertension  - Blood pressure today of 132/84   is at the goal of <140/90 with no exacerbation.  - Continue current regimen.  Instructed to check BP at home.  - Cautioned patient to monitor with antibiotics, herbals and any OTC medications  - electrolytes and renal function done and okay  - " losartan (COZAAR) 50 MG tablet  Dispense: 90 tablet; Refill: 3  - chlorthalidone (HYGROTON) 25 MG tablet  Dispense: 45 tablet; Refill: 3    Stage 3a chronic kidney disease  Stable overall    Paroxysmal atrial fibrillation (H)  -Continue to monitor.  Overall stable.  - CBC with platelets  - Magnesium    Thrombocytopenia (H)  -Improved    Screening for AAA (abdominal aortic aneurysm)  - US Aorta Medicare AAA Screening    Need for pneumococcal vaccination  Given today    Screening for prostate cancer  - PSA Screen GH    Screening for hyperlipidemia  - Lipid Profile    Screening for diabetes mellitus  - Comprehensive metabolic panel    Lesion of skin of right ear  - Adult Dermatology Referral    Chronic pain of left knee  - Ice, elevation, gentle movement/rest as tolerated  - Ibuprofen, Naproxen or Tylenol as needed  - offered PT, patient will call if interested in future  - patient is to call if he has additional problems with this or if new symptoms develop          Preventative Care Guidelines and Health Counseling     COUNSELING:  Reviewed preventive health counseling  Special attention given to:   Preventative screening  Regular exercise  Healthy diet/nutrition  Immunizations   Reviewed preventive health counseling, as reflected in patient instructions    132/84       Body mass index is 25 kg/m .         Appropriate preventive services were discussed with this patient, including applicable screening as appropriate for cardiovascular disease, diabetes, osteopenia/osteoporosis, and glaucoma.  As appropriate for age/gender, discussed screening for colorectal cancer, prostate cancer, breast cancer, and cervical cancer. Checklist reviewing preventive services available has been given to the patient.    Reviewed patients plan of care and provided an AVS. The Basic Care Plan (routine screening as documented in Health Maintenance) for patient meets the Care Plan requirement. This Care Plan has been established and reviewed  with the Patient and any available family members.    Counseling Resources:  ATP IV Guidelines  Pooled Cohorts Equation Calculator  Breast Cancer Risk Calculator  FRAX Risk Assessment  ICSI Preventive Guidelines  Dietary Guidelines for Americans, 2010  USDA's MyPlate  ASA Prophylaxis  Lung CA Screening    Alberta Fan DO  8/9/2021  10:19 AM  [facility]

## 2021-08-18 ENCOUNTER — HOSPITAL ENCOUNTER (OUTPATIENT)
Dept: ULTRASOUND IMAGING | Facility: OTHER | Age: 66
Discharge: HOME | End: 2021-08-18
Attending: INTERNAL MEDICINE | Admitting: INTERNAL MEDICINE
Payer: MEDICARE

## 2021-08-18 DIAGNOSIS — Z13.6 SCREENING FOR AAA (ABDOMINAL AORTIC ANEURYSM): ICD-10-CM

## 2021-08-18 PROCEDURE — 76706 US ABDL AORTA SCREEN AAA: CPT

## 2021-09-05 DIAGNOSIS — I10 ESSENTIAL HYPERTENSION: ICD-10-CM

## 2021-09-08 RX ORDER — LOSARTAN POTASSIUM 50 MG/1
TABLET ORAL
Qty: 90 TABLET | Refills: 0 | OUTPATIENT
Start: 2021-09-08

## 2021-09-08 NOTE — TELEPHONE ENCOUNTER
losartan (COZAAR) 50 MG tablet 90 tablet 3 8/9/2021  No   Sig - Route: Take 1 tablet (50 mg) by mouth daily -     To Gordy Benjamin RN on 9/8/2021 at 4:00 PM

## 2021-09-22 ENCOUNTER — ALLIED HEALTH/NURSE VISIT (OUTPATIENT)
Dept: FAMILY MEDICINE | Facility: OTHER | Age: 66
End: 2021-09-22
Attending: FAMILY MEDICINE
Payer: MEDICARE

## 2021-09-22 DIAGNOSIS — Z20.822 EXPOSURE TO COVID-19 VIRUS: Primary | ICD-10-CM

## 2021-09-22 PROCEDURE — U0003 INFECTIOUS AGENT DETECTION BY NUCLEIC ACID (DNA OR RNA); SEVERE ACUTE RESPIRATORY SYNDROME CORONAVIRUS 2 (SARS-COV-2) (CORONAVIRUS DISEASE [COVID-19]), AMPLIFIED PROBE TECHNIQUE, MAKING USE OF HIGH THROUGHPUT TECHNOLOGIES AS DESCRIBED BY CMS-2020-01-R: HCPCS | Mod: ZL

## 2021-09-22 PROCEDURE — C9803 HOPD COVID-19 SPEC COLLECT: HCPCS

## 2021-09-24 LAB — SARS-COV-2 RNA RESP QL NAA+PROBE: NEGATIVE

## 2021-10-03 ENCOUNTER — HEALTH MAINTENANCE LETTER (OUTPATIENT)
Age: 66
End: 2021-10-03

## 2021-10-16 ENCOUNTER — HOSPITAL ENCOUNTER (EMERGENCY)
Facility: OTHER | Age: 66
Discharge: HOME | End: 2021-10-16
Attending: PHYSICIAN ASSISTANT | Admitting: FAMILY MEDICINE
Payer: MEDICARE

## 2021-10-16 ENCOUNTER — APPOINTMENT (OUTPATIENT)
Dept: GENERAL RADIOLOGY | Facility: OTHER | Age: 66
End: 2021-10-16
Attending: FAMILY MEDICINE
Payer: MEDICARE

## 2021-10-16 VITALS
RESPIRATION RATE: 18 BRPM | HEART RATE: 68 BPM | DIASTOLIC BLOOD PRESSURE: 69 MMHG | TEMPERATURE: 98.4 F | OXYGEN SATURATION: 99 % | WEIGHT: 200 LBS | BODY MASS INDEX: 25 KG/M2 | SYSTOLIC BLOOD PRESSURE: 120 MMHG

## 2021-10-16 DIAGNOSIS — S62.601B: ICD-10-CM

## 2021-10-16 DIAGNOSIS — S62.605A CLOSED NONDISPLACED FRACTURE OF PHALANX OF LEFT RING FINGER, UNSPECIFIED PHALANX, INITIAL ENCOUNTER: ICD-10-CM

## 2021-10-16 DIAGNOSIS — S61.213A LACERATION OF LEFT MIDDLE FINGER WITHOUT FOREIGN BODY WITHOUT DAMAGE TO NAIL, INITIAL ENCOUNTER: ICD-10-CM

## 2021-10-16 DIAGNOSIS — S61.211A LACERATION OF LEFT INDEX FINGER WITHOUT FOREIGN BODY WITHOUT DAMAGE TO NAIL, INITIAL ENCOUNTER: ICD-10-CM

## 2021-10-16 DIAGNOSIS — S62.603B: ICD-10-CM

## 2021-10-16 PROCEDURE — 29130 APPL FINGER SPLINT STATIC: CPT | Performed by: FAMILY MEDICINE

## 2021-10-16 PROCEDURE — 90715 TDAP VACCINE 7 YRS/> IM: CPT | Performed by: FAMILY MEDICINE

## 2021-10-16 PROCEDURE — 12004 RPR S/N/AX/GEN/TRK7.6-12.5CM: CPT | Mod: XU | Performed by: FAMILY MEDICINE

## 2021-10-16 PROCEDURE — 99283 EMERGENCY DEPT VISIT LOW MDM: CPT | Mod: 25 | Performed by: FAMILY MEDICINE

## 2021-10-16 PROCEDURE — 12004 RPR S/N/AX/GEN/TRK7.6-12.5CM: CPT | Performed by: FAMILY MEDICINE

## 2021-10-16 PROCEDURE — 96365 THER/PROPH/DIAG IV INF INIT: CPT | Performed by: FAMILY MEDICINE

## 2021-10-16 PROCEDURE — 96375 TX/PRO/DX INJ NEW DRUG ADDON: CPT | Performed by: FAMILY MEDICINE

## 2021-10-16 PROCEDURE — 90471 IMMUNIZATION ADMIN: CPT | Performed by: FAMILY MEDICINE

## 2021-10-16 PROCEDURE — 250N000011 HC RX IP 250 OP 636: Performed by: FAMILY MEDICINE

## 2021-10-16 PROCEDURE — 99284 EMERGENCY DEPT VISIT MOD MDM: CPT | Mod: 25 | Performed by: FAMILY MEDICINE

## 2021-10-16 PROCEDURE — 73130 X-RAY EXAM OF HAND: CPT | Mod: LT

## 2021-10-16 RX ORDER — IBUPROFEN 800 MG/1
800 TABLET, FILM COATED ORAL EVERY 8 HOURS PRN
Qty: 24 TABLET | Refills: 0 | Status: SHIPPED | OUTPATIENT
Start: 2021-10-16 | End: 2021-10-24

## 2021-10-16 RX ORDER — CEFAZOLIN SODIUM 2 G/100ML
2 INJECTION, SOLUTION INTRAVENOUS ONCE
Status: COMPLETED | OUTPATIENT
Start: 2021-10-16 | End: 2021-10-16

## 2021-10-16 RX ORDER — ONDANSETRON 4 MG/1
8 TABLET, ORALLY DISINTEGRATING ORAL EVERY 8 HOURS PRN
Qty: 20 TABLET | Refills: 0 | Status: SHIPPED | OUTPATIENT
Start: 2021-10-16 | End: 2021-10-21

## 2021-10-16 RX ORDER — OXYCODONE AND ACETAMINOPHEN 5; 325 MG/1; MG/1
1-2 TABLET ORAL EVERY 4 HOURS PRN
Qty: 30 TABLET | Refills: 0 | Status: SHIPPED | OUTPATIENT
Start: 2021-10-16 | End: 2021-10-21

## 2021-10-16 RX ORDER — BUPIVACAINE HYDROCHLORIDE 5 MG/ML
1 INJECTION, SOLUTION PERINEURAL ONCE
Status: DISCONTINUED | OUTPATIENT
Start: 2021-10-16 | End: 2021-10-16 | Stop reason: HOSPADM

## 2021-10-16 RX ORDER — KETOROLAC TROMETHAMINE 15 MG/ML
15 INJECTION, SOLUTION INTRAMUSCULAR; INTRAVENOUS ONCE
Status: COMPLETED | OUTPATIENT
Start: 2021-10-16 | End: 2021-10-16

## 2021-10-16 RX ADMIN — TETANUS TOXOID, REDUCED DIPHTHERIA TOXOID AND ACELLULAR PERTUSSIS VACCINE, ADSORBED 0.5 ML: 5; 2.5; 8; 8; 2.5 SUSPENSION INTRAMUSCULAR at 13:43

## 2021-10-16 RX ADMIN — KETOROLAC TROMETHAMINE 15 MG: 15 INJECTION, SOLUTION INTRAMUSCULAR; INTRAVENOUS at 14:17

## 2021-10-16 RX ADMIN — CEFAZOLIN SODIUM 2 G: 2 INJECTION, SOLUTION INTRAVENOUS at 13:43

## 2021-10-16 ASSESSMENT — ENCOUNTER SYMPTOMS
MYALGIAS: 1
DYSURIA: 0
ABDOMINAL PAIN: 0
ARTHRALGIAS: 1
SORE THROAT: 0
DIARRHEA: 0
SHORTNESS OF BREATH: 0
CHILLS: 0
BRUISES/BLEEDS EASILY: 1
NAUSEA: 0
COUGH: 0
WEAKNESS: 0
VOMITING: 0
NUMBNESS: 0
FEVER: 0
WOUND: 1

## 2021-10-16 NOTE — ED TRIAGE NOTES
ED Nursing Triage Note (General)   ________________________________    Darron GERMAIN Deepti is a 65 year old Male that presents to triage via private vehicle with a L) hand injury.  Patient was splitting wood when he got his hand smashed between a piece of wood and machinery.  Bruising and bleeding is noted to patients first 3 fingers.  Patient is able to move fingers at this time, however, states numbness is present.   Significant symptoms had onset 30 minutes ago.  Patient appears alert behavior.  GCS-15  Airway: intact  Breathing noted as Normal  Action taken: 3      PRE HOSPITAL PRIOR LIVING SITUATION-home

## 2021-10-16 NOTE — ED PROVIDER NOTES
History     Chief Complaint   Patient presents with     Hand Pain     HPI  Darron Tatum is a 65 year old male who presents to the emergency room for evaluation of a left hand injury.  The patient states that he was splitting wood and became distracted and accidentally struck his hand.  This has resulted in 2 very large lacerations to digits #2 and 3 on the left hand.  He also been to his wedding ring and is unable to remove it.  He complains of dull achy pain that he rates a 5 out of 10 for severity, worsened by movement and touch.  He denies any other injuries.  No further questions or complaints today.  He has a history of atrial fibrillation but is anticoagulated with aspirin daily only.    Allergies:  Allergies   Allergen Reactions     Alphagan P Other (See Comments)       Problem List:    Patient Active Problem List    Diagnosis Date Noted     Chronic kidney disease, stage 3 (H) 08/09/2021     Priority: Medium     Thrombocytopenia (H) 03/18/2019     Priority: Medium     Paroxysmal atrial fibrillation (H) - hx of cardioversion 11/09/2017     Priority: Medium     Benign prostatic hyperplasia 09/26/2013     Priority: Medium        Past Medical History:    Past Medical History:   Diagnosis Date     Atrial fibrillation (H) 10/31/2014     Calculus of kidney      Enlarged prostate without lower urinary tract symptoms (luts) 09/26/2013     HTN (hypertension) 11/13/2019     Mixed hyperlipidemia 03/18/2019     Other specified glaucoma      Squamous cell carcinoma of skin of face        Past Surgical History:    Past Surgical History:   Procedure Laterality Date     CARDIOVERSION  02/2017     COLONOSCOPY  2017     EP ABLATION ATRIAL FLUTTER  01/28/2020     FINGER SURGERY      repair, left long finger injury     HERNIA REPAIR Right 1998     REPAIR RETINACULAR OPEN MEDIAL OR LATERAL      Retinal hole       Family History:    Family History   Problem Relation Age of Onset     Hypertension Mother      Brain Cancer Mother  63     Enlarged prostate Father      Peptic Ulcer Disease Father      Dementia Father      Other - See Comments Brother         detached retina     Skin Cancer Brother         melanoma type     Enlarged prostate Brother      Other - See Comments Brother         detached retina     Atrial fibrillation Brother      Osteopenia Sister        Social History:  Marital Status:   [2]  Social History     Tobacco Use     Smoking status: Never Smoker     Smokeless tobacco: Never Used   Vaping Use     Vaping Use: Never used   Substance Use Topics     Alcohol use: Yes     Alcohol/week: 4.2 standard drinks     Comment: glass of Wine, 4-5 times a week     Drug use: No     Comment: Drug use: No        Medications:    ibuprofen (ADVIL/MOTRIN) 800 MG tablet  ondansetron (ZOFRAN ODT) 4 MG ODT tab  oxyCODONE-acetaminophen (PERCOCET) 5-325 MG tablet  aspirin 81 MG EC tablet  chlorthalidone (HYGROTON) 25 MG tablet  losartan (COZAAR) 50 MG tablet  Lutein 6 MG CAPS  timolol maleate (TIMOPTIC) 0.5 % ophthalmic solution  travoprost, LONI Free, (TRAVATAN Z) 0.004 % ophthalmic solution          Review of Systems   Constitutional: Negative for chills and fever.   HENT: Negative for congestion and sore throat.    Eyes: Negative for visual disturbance.   Respiratory: Negative for cough and shortness of breath.    Cardiovascular: Negative for chest pain.   Gastrointestinal: Negative for abdominal pain, diarrhea, nausea and vomiting.   Genitourinary: Negative for dysuria.   Musculoskeletal: Positive for arthralgias and myalgias.   Skin: Positive for wound. Negative for pallor.   Neurological: Negative for weakness and numbness.   Hematological: Bruises/bleeds easily.   All other systems reviewed and are negative.      Physical Exam   BP: 126/77  Pulse: 67  Temp: 98.4  F (36.9  C)  Resp: 18  Weight: 90.7 kg (200 lb)  SpO2: 98 %      Physical Exam  Vitals and nursing note reviewed.   Constitutional:       General: He is not in acute distress.      Appearance: Normal appearance. He is well-developed. He is not ill-appearing or diaphoretic.   HENT:      Head: Normocephalic and atraumatic.      Right Ear: External ear normal.      Left Ear: External ear normal.      Nose: Nose normal.      Mouth/Throat:      Lips: Pink.      Mouth: Mucous membranes are moist.      Pharynx: Oropharynx is clear. No oropharyngeal exudate.   Eyes:      Extraocular Movements: Extraocular movements intact.      Conjunctiva/sclera: Conjunctivae normal.      Pupils: Pupils are equal, round, and reactive to light.   Neck:      Thyroid: No thyromegaly.   Cardiovascular:      Rate and Rhythm: Normal rate and regular rhythm.      Pulses: Normal pulses.      Heart sounds: Normal heart sounds, S1 normal and S2 normal. No murmur heard.     Pulmonary:      Effort: Pulmonary effort is normal.      Breath sounds: Normal breath sounds. No decreased breath sounds, wheezing, rhonchi or rales.   Abdominal:      General: Bowel sounds are normal.      Palpations: Abdomen is soft.      Tenderness: There is no abdominal tenderness.   Musculoskeletal:         General: Tenderness present.      Right hand: Normal.      Left hand: Swelling, laceration, tenderness and bony tenderness present. Decreased range of motion. Normal strength. Normal sensation. Normal capillary refill. Normal pulse.      Cervical back: Full passive range of motion without pain, normal range of motion and neck supple.      Right lower leg: No edema.      Left lower leg: No edema.      Comments: Left second digit has a large vertical incision along the dorsal aspect that is approximately 3.5 cm in length.  Left third digit has a large vertical incision along the dorsal aspect that is approximately 7 cm in length.  Generalized brusing and swelling digits 2 and 3.  Small area of bruising and swelling of digit 4 adjacent to bent wedding ring.    Lymphadenopathy:      Cervical: No cervical adenopathy.   Skin:     General: Skin is warm.       Capillary Refill: Capillary refill takes less than 2 seconds.   Neurological:      Mental Status: He is alert and oriented to person, place, and time.      GCS: GCS eye subscore is 4. GCS verbal subscore is 5. GCS motor subscore is 6.      Cranial Nerves: Cranial nerves are intact.      Sensory: Sensation is intact.      Motor: Motor function is intact.   Psychiatric:         Mood and Affect: Mood normal.         Behavior: Behavior normal. Behavior is cooperative.         ED Course     Procedures     [facility] Procedure Note        Laceration Repair:    Performed by: Kishor Dixon MD, MD  Authorized by: Kishor Dixon MD, MD  Consent given by: Patient who states understanding of the procedure being performed after discussing the risks, benefits and alternatives.    Preparation: Patient was prepped and draped in usual sterile fashion.  Irrigation solution: saline    Body area: Left Digit 2  Laceration length: 3cm  Contamination: The wound is not contaminated.  Foreign bodies:none  Tendon involvement: none  Anesthesia: Digital block  Local anesthetic: Bupivacaine 0.5%, Lidocaine     1%  Anesthetic total: 3ml    Debridement: none  Skin closure: Closed with 7 x 4.0 Ethilon  Technique: interrupted  Approximation: close  Approximation difficulty: simple    Patient tolerance: Patient tolerated the procedure well with no immediate complications.    [facility] Procedure Note        Laceration Repair:    Performed by: Kishor Dixon MD, MD  Authorized by: Kishor Dixon MD, MD  Consent given by: Patient who states understanding of the procedure being performed after discussing the risks, benefits and alternatives.    Preparation: Patient was prepped and draped in usual sterile fashion.  Irrigation solution: saline    Body area: Left 3rd finger  Laceration length: 7cm  Contamination: The wound is not contaminated.  Foreign bodies:none  Tendon involvement: none  Anesthesia: Digital block  Local  anesthetic: Bupivacaine 0.5%, Lidocaine     1%  Anesthetic total: 4ml    Debridement: none  Skin closure: Closed with 17 x 4.0 Ethilon  Technique: interrupted  Approximation: close  Approximation difficulty: simple    Patient tolerance: Patient tolerated the procedure well with no immediate complications.      Critical Care time:  none      Results for orders placed or performed during the hospital encounter of 10/16/21 (from the past 24 hour(s))   XR Hand Left G/E 3 Views    Narrative    PROCEDURE INFORMATION:   Exam: XR Left Hand   Exam date and time: 10/16/2021 10:02 AM   Age: 65 years old   Clinical indication: Injury or trauma; Other: Smashed index, middle, and ring   finger between wood and machinery; Blunt trauma (contusions or hematomas);   Right; Index finger and middle finger and ring finger; Injury date: 10/16/2021     TECHNIQUE:   Imaging protocol: XR Left hand.   Views: 3 or more views.     COMPARISON:   No relevant prior studies available.     FINDINGS:   Bones/joints: Multiple nondisplaced fractures of the distal left 2nd, 3rd, and   4th phalanges. Left distal 2nd tuft fracture. Left 3rd distal tuft fracture and   avulsion at the lateral base of the phalanx. Adjacent soft tissue swelling   around the avulsion fracture in the 3rd finger. Best seen on the lateral view,   there is minimal dorsal angulation of the base of the left 4th distal phalanx   fracture.   Soft tissues: Associated soft tissue disruptions and swelling of the mid left   2nd through 4th fingers with lacerations/introduced gas in the soft tissues   from the injury. No radiopaque foreign body.       Impression    IMPRESSION:   1. Multiple fractures of the distal left 2nd through 4th phalanges.   2. Soft tissue disruptions of the left 2nd, 3rd, and 4th fingers with   introduced gas identified from recent injury/laceration. No foreign body.     THIS DOCUMENT HAS BEEN ELECTRONICALLY SIGNED BY MD Nunu NETTLES  (ANCEF) intermittent infusion 2 g in 100 mL dextrose PRE-MIX (2 g Intravenous New Bag 10/16/21 1343)   BUPivacaine (MARCAINE) 0.5% injection (has no administration in time range)   lidocaine 1 % 10 mL (has no administration in time range)   ketorolac (TORADOL) injection 15 mg (has no administration in time range)   Tdap (tetanus-diptheria-acell pertussis) (BOOSTRIX) injection [place] 0.5 mL (0.5 mLs Intramuscular Given 10/16/21 1343)       Assessments & Plan (with Medical Decision Making)     I have reviewed the nursing notes.    The lacerations are repaired as above.  Digits #2 and 3 of the left hand are placed in tube gauze.  Digit #4 in addition to the entire left hand are placed in a splint.  The patient is given a sling.    I had a discussion with the patient and the family regarding the symptoms, exam, laboratory, CT Scan, X ray results, diagnosis, and plan.    I answered all questions to the best of my ability.  The patient is discharged home in good condition.  Follow-up with orthopedic hand surgery.  The importance of this was discussed with the patient who voiced his understanding and agreement.  We discussed the fact that he has a significant crush injury which may need further modification.  He is given prescriptions for ibuprofen, Percocet, Zofran.  He is instructed to keep the hand splinted until he is seen by orthopedic hand surgeon.  He is given a sling.  He does not need a note for work.  The patient voiced understanding of the plan, was agreeable, and had no further questions or concerns. Advised to return for any worsening symptoms.      New Prescriptions    IBUPROFEN (ADVIL/MOTRIN) 800 MG TABLET    Take 1 tablet (800 mg) by mouth every 8 hours as needed for moderate pain    ONDANSETRON (ZOFRAN ODT) 4 MG ODT TAB    Take 2 tablets (8 mg) by mouth every 8 hours as needed for nausea    OXYCODONE-ACETAMINOPHEN (PERCOCET) 5-325 MG TABLET    Take 1-2 tablets by mouth every 4 hours as needed for pain        Final diagnoses:   Laceration of left index finger without foreign body without damage to nail, initial encounter   Laceration of left middle finger without foreign body without damage to nail, initial encounter   Open nondisplaced fracture of phalanx of left index finger, unspecified phalanx, initial encounter   Open nondisplaced fracture of phalanx of left middle finger, unspecified phalanx, initial encounter   Closed nondisplaced fracture of phalanx of left ring finger, unspecified phalanx, initial encounter       10/16/2021   [facility]     Kishor Dixon MD  10/16/21 1344       Kishor Dixon MD  10/16/21 1410

## 2021-10-26 ENCOUNTER — LAB (OUTPATIENT)
Dept: LAB | Facility: OTHER | Age: 66
End: 2021-10-26
Attending: INTERNAL MEDICINE
Payer: MEDICARE

## 2021-10-26 DIAGNOSIS — R19.7 DIARRHEA, UNSPECIFIED TYPE: ICD-10-CM

## 2021-10-26 LAB
C DIFF TOX B STL QL: NEGATIVE
RIBOTYPE 027/NAP1/BI: NORMAL

## 2021-10-26 PROCEDURE — 87493 C DIFF AMPLIFIED PROBE: CPT | Mod: ZL

## 2021-10-29 ENCOUNTER — OFFICE VISIT (OUTPATIENT)
Dept: ORTHOPEDICS | Facility: OTHER | Age: 66
End: 2021-10-29
Attending: SPECIALIST
Payer: MEDICARE

## 2021-10-29 ENCOUNTER — HOSPITAL ENCOUNTER (OUTPATIENT)
Dept: OCCUPATIONAL THERAPY | Facility: OTHER | Age: 66
Setting detail: THERAPIES SERIES
End: 2021-10-29
Attending: SPECIALIST
Payer: MEDICARE

## 2021-10-29 DIAGNOSIS — S67.22XA CRUSH INJURY OF HAND, LEFT, INITIAL ENCOUNTER: Primary | ICD-10-CM

## 2021-10-29 DIAGNOSIS — S69.92XA HAND INJURY, LEFT, INITIAL ENCOUNTER: ICD-10-CM

## 2021-10-29 PROCEDURE — G0463 HOSPITAL OUTPT CLINIC VISIT: HCPCS

## 2021-10-29 PROCEDURE — G0463 HOSPITAL OUTPT CLINIC VISIT: HCPCS | Performed by: SPECIALIST

## 2021-10-29 PROCEDURE — 97166 OT EVAL MOD COMPLEX 45 MIN: CPT | Mod: GO | Performed by: OCCUPATIONAL THERAPIST

## 2021-10-29 PROCEDURE — 97110 THERAPEUTIC EXERCISES: CPT | Mod: GO | Performed by: OCCUPATIONAL THERAPIST

## 2021-10-29 PROCEDURE — 99213 OFFICE O/P EST LOW 20 MIN: CPT | Performed by: SPECIALIST

## 2021-10-29 NOTE — PROGRESS NOTES
[facility]          OUTPATIENT OCCUPATIONAL THERAPY  EVALUATION  PLAN OF TREATMENT FOR OUTPATIENT REHABILITATION  (COMPLETE FOR INITIAL CLAIMS ONLY)  Patient's Last Name, First Name, M.I.  YOB: 1955  Darron Tatum                        Provider's Name  [facility] Medical Record No.  [id number]                               Onset Date:     10/16/21   Start of Care Date:     10/29/21   Type:     ___PT   _X_OT   ___SLP Medical Diagnosis:     (P) S69.92XA (ICD-10-CM) - Hand injury                          OT Diagnosis:     Decline in self care and home mangement tasks due to hand injury Visits from SOC:  1   _________________________________________________________________________________  Plan of Treatment/Functional Goals:  ROM, Orthotic fitting/training, Strengthening, Stretching, Therapeutic activities, Manual therapy, Joint mobilization, Coordination training  Iontophoresis, Paraffin bath, Ultrasound, Hot/cold packs  Phonophoresis with 10% ketoprofen              Goals  Goal Identifier: AROM  Goal Description: Pt will ba able to make a full composite fist in left hand to be able to grasp and hold pills asn coins in his hand   Target Date: 11/19/21     Goal Identifier: self cares  Goal Description: Pt will be able to use his left hand to perform bilateral hand tasks needed for sefl cares tasks such as cutting meat, shaving, managing fasteners.   Target Date: 11/12/21     Goal Identifier: grasping  Goal Description: Pt will be able to grasp and hold up to 10# in his left hand to increase ease with home mangement taks  Target Date: 12/24/21     Goal Identifier: Hand Strength  Goal Description: Pt will have 80# or greater of  strength in left hand to be able to lift firewood into sotve  Target Date: 01/21/22                 Therapy Frequency: 2x/week      Predicted Duration of Therapy Intervention (days/wks): 12 weeks  Lisa Villavicencio, OT          I CERTIFY THE NEED FOR THESE SERVICES FURNISHED UNDER        THIS PLAN OF TREATMENT AND WHILE UNDER MY CARE     (Physician co-signature of this document indicates review and certification of the therapy plan).                 ,    Certification date from: 10/29/21, Certification date to: 12/24/21               Referring Physician: Dr. Barnett     Initial Assessment        See Epic Evaluation      Start Of Care Date: 10/29/21

## 2021-10-29 NOTE — PROGRESS NOTES
Visit Date: 10/29/2021    HISTORY OF PRESENT ILLNESS:  Mr. 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  He is seen today for a followup visit of his left hand.  He sustained an injury in a power log splitter on or about 10/16/2021.  He was referred and Dr. Cuellar irrigated and closed his digit.  He did not tolerate clindamycin very well.     PAST MEDICAL HISTORY, PAST SURGICAL HISTORY, MEDICATIONS, ALLERGIES:  Reviewed.    PHYSICAL EXAMINATION:  Reveals a 65-year-old male, alert and oriented x3 and appropriate.  Gait and station are appropriate, well-groomed and well kempt.  Examination reveals full and symmetric range of motion of elbows, wrists.  Examination of the left hand reveals previous evidence of soft tissue injury to the long finger.  In addition to this, he has burst type injuries of both the index and long finger.  The long finger is more involved.  The wounds have been closed and do not appear infected.  He has significant limitation in motion.  Flexor tendon function appears intact.    IMAGING:  X-rays were reviewed dated 10/16/2021.  The plain film radiographs show a tuft fracture of the left long finger.  There is evidence of previous soft tissue injury.  There is also some irregularity along the radial side of the distal phalanx.  This may be acute.  Remaining films do not show any evidence of obvious fracture to my eye.    IMPRESSION:  Log splitter injury with injuries to the left long and index finger.    PLAN:  At this point, I have recommended a course of extensive hand therapy.  He has passive range of motion, shows reasonably soft endpoints, but I am concerned that if we do not get him to therapy urgently, he will develop more stiffness.  I think that even with the aggressive therapy, he may have persistent limitation of motion due to the severe nature of the crush component.  Stitches are not yet ready to be removed.  We will leave them in and  therapy can remove them when the wounds appear stable.  I would like to see him back in 2 weeks' time to monitor his progress, sooner if any problems occur.    Maulik Barnett MD        D: 10/29/2021   T: 10/29/2021   MT: [facility]    Name:     EMILY RIZO  MRN:      [id number]        Account:    [id number]   :      1955           Visit Date: 10/29/2021     Document: [id number]

## 2021-10-29 NOTE — PROGRESS NOTES
Patient is here for follow up on his left hand.   Shabana Fenton LPN .....................10/29/2021 8:58 AM

## 2021-10-29 NOTE — PROGRESS NOTES
10/29/21 1200   Quick Adds   Quick Adds Certification   Type of Visit Initial Outpatient Occupational Therapy Evaluation   General Information   Start Of Care Date 10/29/21   Referring Physician Dr. Barnett   Orders Evaluate and treat as indicated   Orders Date 10/29/21   Medical Diagnosis S69.92XA (ICD-10-CM) - Hand injury   Onset of Illness/Injury or Date of Surgery 10/16/21   Surgical/Medical History Reviewed Yes   Additional Occupational Profile Info/Pertinent History of Current Problem Pt sustianed injuries to digits 2-4 on the left hand from a  power log splitter. He underwent exam by ortho and has a possible tuft fracutre on the long finger. He has previous injuries ns deformities to the distal phalange of the LF. The sutures are still in and will be removed as able depending on healing. He is very active caring for the home and grounds.    Role/Living Environment   Prior Level - ADLS Independent   Prior Responsibilities - IADL Meal Preparation;Housekeeping;Shopping;Yardwork;Finances;Driving;Medication management   Patient/family Goals Statement to gain as much function of his hand as possible   Pain   Patient currently in pain Yes   Pain rating 0-1   Pain description   (stiffness)   Pain description comment pulling,stiffness   Fall Risk Screen   Fall screen completed by OT   Have you fallen 2 or more times in the past year? No   Have you fallen and had an injury in the past year? No   Is patient a fall risk? No   Hand, Detailed ROM   Hand Detailed ROM Left Hand AROM   Left Hand AROM   Left Digit 2 AROM - degrees MCP: 80  PIP: 58  DIP: 29   Left Digit 3 AROM - degrees MCP: 84  PIP 50   Left Digit 4 AROM - degrees MCP: 84  PIP: 104   Right Hand AROM   Right Digit 2 AROM - degrees 84    108   Right Digit 3 AROM - degrees 86  106   Right Digit 4 AROM - degrees 90  104   Right Digit 5 AROM - degrees 87  106   Hand Strength   Hand Dominance Right   Right Hand  (pounds) 100 pounds   Right Lateral Pinch  (pounds) 21 pounds   Right Three Point Pinch (pounds) 20 pounds   Planned Therapy Interventions   Planned Therapy Interventions ROM;Orthotic fitting/training;Strengthening;Stretching;Therapeutic activities;Manual therapy;Joint mobilization;Coordination training   Planned Modalities Iontophoresis;Paraffin bath;Ultrasound;Hot/cold packs   Intervention Comments Phonophoresis with 10% ketoprofen    OT Goal 1   Goal Identifier AROM   Goal Description Pt will ba able to make a full composite fist in left hand to be able to grasp and hold pills asn coins in his hand    Target Date 11/19/21    OT Goal 2   Goal Identifier self cares   Goal Description Pt will be able to use his left hand to perform bilateral hand tasks needed for sefl cares tasks such as cutting meat, shaving, managing fasteners.    Target Date 11/12/21    OT Goal 3   Goal Identifier grasping   Goal Description Pt will be able to grasp and hold up to 10# in his left hand to increase ease with home mangement taks   Target Date 12/24/21   OT Goal 4   Goal Identifier Hand Strength   Goal Description Pt will have 80# or greater of  strength in left hand to be able to lift firewood into sotve   Target Date 01/21/22   Clinical Impression   Criteria for Skilled Therapeutic Interventions Met Yes, treatment indicated   OT Diagnosis Decline in self care and home mangement tasks due to hand injury   Influenced by the following impairments pian, weakness,open wounds, decrease ROM   Assessment of Occupational Performance 1-3 Performance Deficits   Identified Performance Deficits fastening zipper, picking up firewood,    Clinical Decision Making (Complexity) Moderate complexity   Therapy Frequency 2x/week   Predicted Duration of Therapy Intervention (days/wks) 12 weeks   Risks and Benefits of Treatment have been explained. Yes   Patient, Family & other staff in agreement with plan of care Yes   Clinical Impression Comments Pt would benefit from skilled OT for wounda  nd scar management and to progress ROM and strength annd prevention of adhesions   Education Assessment   Barriers To Learning No Barriers   Preferred Learning Style Demonstration;Listening;Pictures/video;Reading   Therapy Certification   Certification date from 10/29/21   Certification date to 12/24/21   Total Evaluation Time   OT Eval, Moderate Complexity Minutes (41976) 20

## 2021-11-02 ENCOUNTER — HOSPITAL ENCOUNTER (OUTPATIENT)
Dept: OCCUPATIONAL THERAPY | Facility: OTHER | Age: 66
Setting detail: THERAPIES SERIES
End: 2021-11-02
Attending: SPECIALIST
Payer: MEDICARE

## 2021-11-02 PROCEDURE — 97140 MANUAL THERAPY 1/> REGIONS: CPT | Mod: GO

## 2021-11-02 PROCEDURE — 97110 THERAPEUTIC EXERCISES: CPT | Mod: GO

## 2021-11-03 ENCOUNTER — ALLIED HEALTH/NURSE VISIT (OUTPATIENT)
Dept: FAMILY MEDICINE | Facility: OTHER | Age: 66
End: 2021-11-03
Attending: FAMILY MEDICINE
Payer: MEDICARE

## 2021-11-03 DIAGNOSIS — Z20.822 COVID-19 RULED OUT: ICD-10-CM

## 2021-11-03 DIAGNOSIS — R05.9 COUGH: Primary | ICD-10-CM

## 2021-11-03 PROCEDURE — C9803 HOPD COVID-19 SPEC COLLECT: HCPCS

## 2021-11-03 PROCEDURE — U0005 INFEC AGEN DETEC AMPLI PROBE: HCPCS | Mod: ZL

## 2021-11-05 ENCOUNTER — TELEPHONE (OUTPATIENT)
Dept: INTERNAL MEDICINE | Facility: OTHER | Age: 66
End: 2021-11-05

## 2021-11-05 LAB — SARS-COV-2 RNA RESP QL NAA+PROBE: POSITIVE

## 2021-11-05 NOTE — TELEPHONE ENCOUNTER
States he just tested positive for covid-19 and he is wanting to know if there is something he should do and if he should be getting an infusion

## 2021-11-05 NOTE — TELEPHONE ENCOUNTER
Routing to Dr. Fan to advise on infusions prior to calling Pt back with other instructions. Lara Jain RN .............. 11/5/2021  11:51 AM

## 2021-11-12 ENCOUNTER — OFFICE VISIT (OUTPATIENT)
Dept: ORTHOPEDICS | Facility: OTHER | Age: 66
End: 2021-11-12
Attending: SPECIALIST
Payer: MEDICARE

## 2021-11-12 DIAGNOSIS — S67.22XA CRUSH INJURY OF HAND, LEFT, INITIAL ENCOUNTER: Primary | ICD-10-CM

## 2021-11-12 PROCEDURE — 99213 OFFICE O/P EST LOW 20 MIN: CPT | Performed by: SPECIALIST

## 2021-11-12 PROCEDURE — G0463 HOSPITAL OUTPT CLINIC VISIT: HCPCS

## 2021-11-12 NOTE — PROGRESS NOTES
Patient is here for follow up on his left hand.   Shabana Fenton LPN .....................11/12/2021 2:26 PM

## 2021-11-13 NOTE — PROGRESS NOTES
Visit Date: 2021    SUBJECTIVE:  Mr. Tatum returns for followup.  He injured his hand in a log splitter on about 10/16/2021 and he was treated by Dr. Cuellar.  He is back today overall doing well.  He had some therapy.    OBJECTIVE:  Physical examination today shows his traumatic wounds to be healing nicely.  His range of motion is excellent.  There is still a small amount of eschar.    IMPRESSION:  Status post left hand power log splitter injury 10/16/2021.  He is progressing remarkably well.  I have recommended he continues with use of the hand.  He can get it wet, but I would not soak it.  I would like to see him back for a final visit in about 4-6 weeks, sooner if any problems occur.    Maulik Barnett MD        D: 2021   T: 2021   MT: [facility]    Name:     EMILY TATUM  MRN:      [id number]        Account:    [id number]   :      1955           Visit Date: 2021     Document: [id number]

## 2021-11-19 ENCOUNTER — HOSPITAL ENCOUNTER (OUTPATIENT)
Dept: OCCUPATIONAL THERAPY | Facility: OTHER | Age: 66
Setting detail: THERAPIES SERIES
End: 2021-11-19
Attending: SPECIALIST
Payer: MEDICARE

## 2021-11-19 PROCEDURE — 97110 THERAPEUTIC EXERCISES: CPT | Mod: GO | Performed by: OCCUPATIONAL THERAPIST

## 2021-11-19 PROCEDURE — 97140 MANUAL THERAPY 1/> REGIONS: CPT | Mod: GO | Performed by: OCCUPATIONAL THERAPIST

## 2021-11-19 PROCEDURE — 97035 APP MDLTY 1+ULTRASOUND EA 15: CPT | Mod: GO | Performed by: OCCUPATIONAL THERAPIST

## 2021-11-23 ENCOUNTER — HOSPITAL ENCOUNTER (OUTPATIENT)
Dept: OCCUPATIONAL THERAPY | Facility: OTHER | Age: 66
Setting detail: THERAPIES SERIES
End: 2021-11-23
Attending: SPECIALIST
Payer: MEDICARE

## 2021-11-23 PROCEDURE — 97140 MANUAL THERAPY 1/> REGIONS: CPT | Mod: GO | Performed by: OCCUPATIONAL THERAPIST

## 2021-11-23 PROCEDURE — 97035 APP MDLTY 1+ULTRASOUND EA 15: CPT | Mod: GO | Performed by: OCCUPATIONAL THERAPIST

## 2021-11-23 PROCEDURE — 97110 THERAPEUTIC EXERCISES: CPT | Mod: GO | Performed by: OCCUPATIONAL THERAPIST

## 2021-11-30 ENCOUNTER — HOSPITAL ENCOUNTER (OUTPATIENT)
Dept: OCCUPATIONAL THERAPY | Facility: OTHER | Age: 66
Setting detail: THERAPIES SERIES
End: 2021-11-30
Attending: SPECIALIST
Payer: MEDICARE

## 2021-11-30 PROCEDURE — 97035 APP MDLTY 1+ULTRASOUND EA 15: CPT | Mod: GO | Performed by: OCCUPATIONAL THERAPIST

## 2021-11-30 PROCEDURE — 97110 THERAPEUTIC EXERCISES: CPT | Mod: GO | Performed by: OCCUPATIONAL THERAPIST

## 2021-11-30 PROCEDURE — 97140 MANUAL THERAPY 1/> REGIONS: CPT | Mod: GO | Performed by: OCCUPATIONAL THERAPIST

## 2021-12-02 ENCOUNTER — HOSPITAL ENCOUNTER (OUTPATIENT)
Dept: OCCUPATIONAL THERAPY | Facility: OTHER | Age: 66
Setting detail: THERAPIES SERIES
End: 2021-12-02
Attending: SPECIALIST
Payer: MEDICARE

## 2021-12-02 PROCEDURE — 97110 THERAPEUTIC EXERCISES: CPT | Mod: GO | Performed by: OCCUPATIONAL THERAPIST

## 2021-12-02 PROCEDURE — 97140 MANUAL THERAPY 1/> REGIONS: CPT | Mod: GO | Performed by: OCCUPATIONAL THERAPIST

## 2021-12-02 PROCEDURE — 97035 APP MDLTY 1+ULTRASOUND EA 15: CPT | Mod: GO | Performed by: OCCUPATIONAL THERAPIST

## 2022-01-23 ENCOUNTER — OFFICE VISIT (OUTPATIENT)
Dept: FAMILY MEDICINE | Facility: OTHER | Age: 67
End: 2022-01-23
Attending: NURSE PRACTITIONER
Payer: MEDICARE

## 2022-01-23 VITALS
RESPIRATION RATE: 18 BRPM | BODY MASS INDEX: 24.6 KG/M2 | SYSTOLIC BLOOD PRESSURE: 122 MMHG | OXYGEN SATURATION: 100 % | WEIGHT: 196.8 LBS | TEMPERATURE: 99.5 F | DIASTOLIC BLOOD PRESSURE: 70 MMHG | HEART RATE: 70 BPM

## 2022-01-23 DIAGNOSIS — J02.9 ACUTE PHARYNGITIS, UNSPECIFIED ETIOLOGY: Primary | ICD-10-CM

## 2022-01-23 LAB — GROUP A STREP BY PCR: NOT DETECTED

## 2022-01-23 PROCEDURE — 87651 STREP A DNA AMP PROBE: CPT | Mod: ZL | Performed by: FAMILY MEDICINE

## 2022-01-23 PROCEDURE — G0463 HOSPITAL OUTPT CLINIC VISIT: HCPCS

## 2022-01-23 PROCEDURE — 99213 OFFICE O/P EST LOW 20 MIN: CPT | Performed by: FAMILY MEDICINE

## 2022-01-23 RX ORDER — SWAB
1 SWAB, NON-MEDICATED MISCELLANEOUS DAILY
COMMUNITY
End: 2022-09-06

## 2022-01-23 RX ORDER — VIT C/B6/B5/MAGNESIUM/HERB 173 50-5-6-5MG
CAPSULE ORAL
COMMUNITY
End: 2022-09-06

## 2022-01-23 ASSESSMENT — PAIN SCALES - GENERAL: PAINLEVEL: MODERATE PAIN (4)

## 2022-01-23 NOTE — NURSING NOTE
"Chief Complaint   Patient presents with     Throat Pain     Patient is here for a sore throat that started Thursday. Patient has been taking Tylenol with the last dose around 4 AM with some relief. Patient has also tried Ibuprofen with some relief.     Initial /70   Pulse 70   Temp 99.5  F (37.5  C) (Tympanic)   Resp 18   Wt 89.3 kg (196 lb 12.8 oz)   SpO2 100%   BMI 24.60 kg/m   Estimated body mass index is 24.6 kg/m  as calculated from the following:    Height as of 8/9/21: 1.905 m (6' 3\").    Weight as of this encounter: 89.3 kg (196 lb 12.8 oz).  Medication Reconciliation: complete    FOOD SECURITY SCREENING QUESTIONS:    The next two questions are to help us understand your food security.  If you are feeling you need any assistance in this area, we have resources available to support you today.    Hunger Vital Signs:  Within the past 12 months we worried whether our food would run out before we got money to buy more. Never  Within the past 12 months the food we bought just didn't last and we didn't have money to get more. Never    Diana Brown LPN  "

## 2022-01-23 NOTE — PROGRESS NOTES
Assessment & Plan   Problem List Items Addressed This Visit     None      Visit Diagnoses     Acute pharyngitis, unspecified etiology    -  Primary    Relevant Orders    Group A Streptococcus PCR Throat Swab    Group A Streptococcus PCR Throat Swab         Results for orders placed or performed in visit on 01/23/22   Group A Streptococcus PCR Throat Swab     Status: Normal    Specimen: Throat; Swab   Result Value Ref Range    Group A strep by PCR Not Detected Not Detected    Narrative    The Xpert Xpress Strep A test, performed on the [employer], is a rapid, qualitative in vitro diagnostic test for the detection of Streptococcus pyogenes (Group A ß-hemolytic Streptococcus, Strep A) in throat swab specimens from patients with signs and symptoms of pharyngitis. The Xpert Xpress Strep A test can be used as an aid in the diagnosis of Group A Streptococcal pharyngitis. The assay is not intended to monitor treatment for Group A Streptococcus infections. The Xpert Xpress Strep A test utilizes an automated real-time polymerase chain reaction (PCR) to detect Streptococcus pyogenes DNA.     This appears viral.  COVID can cause these symptoms.  He does not want testing at this time. Symptom support sand follow up as needed                    No follow-ups on file.    [facility] NURSE  [facility]    Anton Jiménez is a 66 year old who presents for the following health issues     HPI sore throat. Has had this for about 3 days now.  Yesterday started to get hoarse too.  Pain with swallowing. Not getting better or worse. No rhinorrhea.  No cough. No fevers. No known COVID exposures. No fevers. No chills. Has had 2 home COVID tests that were both negative.     Current Outpatient Medications   Medication     aspirin 81 MG EC tablet     chlorthalidone (HYGROTON) 25 MG tablet     losartan (COZAAR) 50 MG tablet     Lutein 6 MG CAPS     Multiple Vitamin (MULTIVITAMIN ADULT PO)     timolol maleate  (TIMOPTIC) 0.5 % ophthalmic solution     travoprost, LONI Free, (TRAVATAN Z) 0.004 % ophthalmic solution     Turmeric 500 MG CAPS     vitamin D3 (CHOLECALCIFEROL) 10 MCG (400 UNIT) capsule     No current facility-administered medications for this visit.               Review of Systems         Objective    /70   Pulse 70   Temp 99.5  F (37.5  C) (Tympanic)   Resp 18   Wt 89.3 kg (196 lb 12.8 oz)   SpO2 100%   BMI 24.60 kg/m    Body mass index is 24.6 kg/m .  Physical Exam  Constitutional:       Appearance: Normal appearance.   HENT:      Right Ear: Tympanic membrane normal.      Left Ear: Tympanic membrane normal.      Nose: Nose normal.      Mouth/Throat:      Mouth: Mucous membranes are moist.      Pharynx: Posterior oropharyngeal erythema present. No oropharyngeal exudate.   Cardiovascular:      Rate and Rhythm: Normal rate and regular rhythm.   Neurological:      General: No focal deficit present.      Mental Status: He is alert and oriented to person, place, and time.   Psychiatric:         Mood and Affect: Mood normal.         Behavior: Behavior normal.         Thought Content: Thought content normal.

## 2022-05-25 ENCOUNTER — MYC MEDICAL ADVICE (OUTPATIENT)
Dept: INTERNAL MEDICINE | Facility: OTHER | Age: 67
End: 2022-05-25
Payer: COMMERCIAL

## 2022-05-25 DIAGNOSIS — L72.9 CYST OF SKIN: Primary | ICD-10-CM

## 2022-05-25 NOTE — CONFIDENTIAL NOTE
General surgery referral placed.  Please let him know someone will contact him to get him scheduled.

## 2022-09-02 ASSESSMENT — ENCOUNTER SYMPTOMS
HEARTBURN: 0
HEADACHES: 0
HEMATOCHEZIA: 0
COUGH: 0
FEVER: 0
CHILLS: 0
ABDOMINAL PAIN: 0
DYSURIA: 0
HEMATURIA: 0
DIZZINESS: 0
PALPITATIONS: 0
MYALGIAS: 0
NAUSEA: 0
NERVOUS/ANXIOUS: 0
DIARRHEA: 0
CONSTIPATION: 0
EYE PAIN: 0
WEAKNESS: 0
JOINT SWELLING: 1
PARESTHESIAS: 0
SORE THROAT: 0
ARTHRALGIAS: 1
SHORTNESS OF BREATH: 0

## 2022-09-02 ASSESSMENT — ACTIVITIES OF DAILY LIVING (ADL): CURRENT_FUNCTION: NO ASSISTANCE NEEDED

## 2022-09-02 ASSESSMENT — PATIENT HEALTH QUESTIONNAIRE - PHQ9
SUM OF ALL RESPONSES TO PHQ QUESTIONS 1-9: 2
SUM OF ALL RESPONSES TO PHQ QUESTIONS 1-9: 2
10. IF YOU CHECKED OFF ANY PROBLEMS, HOW DIFFICULT HAVE THESE PROBLEMS MADE IT FOR YOU TO DO YOUR WORK, TAKE CARE OF THINGS AT HOME, OR GET ALONG WITH OTHER PEOPLE: NOT DIFFICULT AT ALL

## 2022-09-03 DIAGNOSIS — I10 ESSENTIAL HYPERTENSION: ICD-10-CM

## 2022-09-04 ENCOUNTER — HEALTH MAINTENANCE LETTER (OUTPATIENT)
Age: 67
End: 2022-09-04

## 2022-09-06 ENCOUNTER — OFFICE VISIT (OUTPATIENT)
Dept: INTERNAL MEDICINE | Facility: OTHER | Age: 67
End: 2022-09-06
Attending: INTERNAL MEDICINE
Payer: MEDICARE

## 2022-09-06 ENCOUNTER — HOSPITAL ENCOUNTER (OUTPATIENT)
Dept: GENERAL RADIOLOGY | Facility: OTHER | Age: 67
Discharge: HOME | End: 2022-09-06
Attending: INTERNAL MEDICINE
Payer: MEDICARE

## 2022-09-06 VITALS
SYSTOLIC BLOOD PRESSURE: 126 MMHG | BODY MASS INDEX: 24.49 KG/M2 | DIASTOLIC BLOOD PRESSURE: 70 MMHG | WEIGHT: 197 LBS | OXYGEN SATURATION: 100 % | RESPIRATION RATE: 16 BRPM | HEART RATE: 72 BPM | HEIGHT: 75 IN | TEMPERATURE: 96.2 F

## 2022-09-06 DIAGNOSIS — M25.562 CHRONIC PAIN OF LEFT KNEE: ICD-10-CM

## 2022-09-06 DIAGNOSIS — D69.6 THROMBOCYTOPENIA (H): ICD-10-CM

## 2022-09-06 DIAGNOSIS — G89.29 CHRONIC PAIN OF LEFT KNEE: ICD-10-CM

## 2022-09-06 DIAGNOSIS — Z13.220 SCREENING FOR HYPERLIPIDEMIA: ICD-10-CM

## 2022-09-06 DIAGNOSIS — Z13.1 SCREENING FOR DIABETES MELLITUS: ICD-10-CM

## 2022-09-06 DIAGNOSIS — Z23 NEED FOR PNEUMOCOCCAL VACCINATION: ICD-10-CM

## 2022-09-06 DIAGNOSIS — Z12.11 SCREENING FOR COLON CANCER: ICD-10-CM

## 2022-09-06 DIAGNOSIS — N18.31 STAGE 3A CHRONIC KIDNEY DISEASE (H): ICD-10-CM

## 2022-09-06 DIAGNOSIS — I10 ESSENTIAL HYPERTENSION: ICD-10-CM

## 2022-09-06 DIAGNOSIS — I48.0 PAROXYSMAL ATRIAL FIBRILLATION (H): ICD-10-CM

## 2022-09-06 DIAGNOSIS — Z00.00 ENCOUNTER FOR MEDICARE ANNUAL WELLNESS EXAM: Primary | ICD-10-CM

## 2022-09-06 LAB
ALBUMIN SERPL-MCNC: 4.3 G/DL (ref 3.5–5.7)
ALP SERPL-CCNC: 60 U/L (ref 34–104)
ALT SERPL W P-5'-P-CCNC: 12 U/L (ref 7–52)
ANION GAP SERPL CALCULATED.3IONS-SCNC: 5 MMOL/L (ref 3–14)
AST SERPL W P-5'-P-CCNC: 21 U/L (ref 13–39)
BILIRUB SERPL-MCNC: 0.5 MG/DL (ref 0.3–1)
BUN SERPL-MCNC: 20 MG/DL (ref 7–25)
CALCIUM SERPL-MCNC: 9.6 MG/DL (ref 8.6–10.3)
CHLORIDE BLD-SCNC: 104 MMOL/L (ref 98–107)
CHOLEST SERPL-MCNC: 161 MG/DL
CO2 SERPL-SCNC: 31 MMOL/L (ref 21–31)
CREAT SERPL-MCNC: 1.32 MG/DL (ref 0.7–1.3)
ERYTHROCYTE [DISTWIDTH] IN BLOOD BY AUTOMATED COUNT: 12.2 % (ref 10–15)
FASTING STATUS PATIENT QL REPORTED: ABNORMAL
FERRITIN SERPL-MCNC: 23 NG/ML (ref 24–336)
GFR SERPL CREATININE-BSD FRML MDRD: 59 ML/MIN/1.73M2
GLUCOSE BLD-MCNC: 88 MG/DL (ref 70–105)
HCT VFR BLD AUTO: 38.2 % (ref 40–53)
HDLC SERPL-MCNC: 41 MG/DL (ref 23–92)
HGB BLD-MCNC: 13.6 G/DL (ref 13.3–17.7)
IRON SATN MFR SERPL: 30 % (ref 20–55)
IRON SERPL-MCNC: 109 UG/DL (ref 50–212)
LDLC SERPL CALC-MCNC: 79 MG/DL
MAGNESIUM SERPL-MCNC: 2.1 MG/DL (ref 1.9–2.7)
MCH RBC QN AUTO: 32.5 PG (ref 26.5–33)
MCHC RBC AUTO-ENTMCNC: 35.6 G/DL (ref 31.5–36.5)
MCV RBC AUTO: 91 FL (ref 78–100)
NONHDLC SERPL-MCNC: 120 MG/DL
PLATELET # BLD AUTO: 197 10E3/UL (ref 150–450)
POTASSIUM BLD-SCNC: 3.3 MMOL/L (ref 3.5–5.1)
PROT SERPL-MCNC: 7.1 G/DL (ref 6.4–8.9)
RBC # BLD AUTO: 4.19 10E6/UL (ref 4.4–5.9)
SODIUM SERPL-SCNC: 140 MMOL/L (ref 134–144)
TIBC SERPL-MCNC: 361.2 UG/DL (ref 245–400)
TRANSFERRIN SERPL-MCNC: 258 MG/DL (ref 203–362)
TRIGL SERPL-MCNC: 206 MG/DL
UIBC (UNSATURATED): 252.2 MG/DL
VIT B12 SERPL-MCNC: 580 PG/ML (ref 180–914)
WBC # BLD AUTO: 8.3 10E3/UL (ref 4–11)

## 2022-09-06 PROCEDURE — G0438 PPPS, INITIAL VISIT: HCPCS | Performed by: INTERNAL MEDICINE

## 2022-09-06 PROCEDURE — 82043 UR ALBUMIN QUANTITATIVE: CPT | Mod: ZL | Performed by: INTERNAL MEDICINE

## 2022-09-06 PROCEDURE — 82607 VITAMIN B-12: CPT | Mod: ZL | Performed by: INTERNAL MEDICINE

## 2022-09-06 PROCEDURE — 90677 PCV20 VACCINE IM: CPT

## 2022-09-06 PROCEDURE — 73562 X-RAY EXAM OF KNEE 3: CPT | Mod: LT

## 2022-09-06 PROCEDURE — 83550 IRON BINDING TEST: CPT | Mod: ZL | Performed by: INTERNAL MEDICINE

## 2022-09-06 PROCEDURE — 83735 ASSAY OF MAGNESIUM: CPT | Mod: ZL | Performed by: INTERNAL MEDICINE

## 2022-09-06 PROCEDURE — 85027 COMPLETE CBC AUTOMATED: CPT | Mod: ZL | Performed by: INTERNAL MEDICINE

## 2022-09-06 PROCEDURE — 99213 OFFICE O/P EST LOW 20 MIN: CPT | Mod: 25 | Performed by: INTERNAL MEDICINE

## 2022-09-06 PROCEDURE — 80061 LIPID PANEL: CPT | Mod: ZL | Performed by: INTERNAL MEDICINE

## 2022-09-06 PROCEDURE — 36415 COLL VENOUS BLD VENIPUNCTURE: CPT | Mod: ZL | Performed by: INTERNAL MEDICINE

## 2022-09-06 PROCEDURE — 80053 COMPREHEN METABOLIC PANEL: CPT | Mod: ZL | Performed by: INTERNAL MEDICINE

## 2022-09-06 PROCEDURE — 82728 ASSAY OF FERRITIN: CPT | Mod: ZL | Performed by: INTERNAL MEDICINE

## 2022-09-06 PROCEDURE — G0463 HOSPITAL OUTPT CLINIC VISIT: HCPCS | Mod: 25

## 2022-09-06 RX ORDER — LOSARTAN POTASSIUM 50 MG/1
50 TABLET ORAL DAILY
Qty: 90 TABLET | Refills: 4 | Status: SHIPPED | OUTPATIENT
Start: 2022-09-06 | End: 2023-09-06

## 2022-09-06 RX ORDER — CHLORTHALIDONE 25 MG/1
12.5 TABLET ORAL DAILY
Qty: 45 TABLET | Refills: 4 | Status: SHIPPED | OUTPATIENT
Start: 2022-09-06 | End: 2023-09-06

## 2022-09-06 RX ORDER — LATANOPROST 50 UG/ML
1 SOLUTION/ DROPS OPHTHALMIC DAILY
COMMUNITY
Start: 2022-08-05 | End: 2023-09-06

## 2022-09-06 ASSESSMENT — ENCOUNTER SYMPTOMS
DIZZINESS: 0
CHILLS: 0
ABDOMINAL PAIN: 0
CONSTIPATION: 0
SHORTNESS OF BREATH: 0
COUGH: 0
FEVER: 0
WEAKNESS: 0
NAUSEA: 0
JOINT SWELLING: 1
HEMATOCHEZIA: 0
PARESTHESIAS: 0
HEADACHES: 0
ARTHRALGIAS: 1
PALPITATIONS: 0
MYALGIAS: 0
EYE PAIN: 0
HEARTBURN: 0
SORE THROAT: 0
NERVOUS/ANXIOUS: 0
HEMATURIA: 0
DIARRHEA: 0
DYSURIA: 0

## 2022-09-06 ASSESSMENT — ACTIVITIES OF DAILY LIVING (ADL): CURRENT_FUNCTION: NO ASSISTANCE NEEDED

## 2022-09-06 ASSESSMENT — PAIN SCALES - GENERAL: PAINLEVEL: NO PAIN (0)

## 2022-09-06 NOTE — PROGRESS NOTES
"SUBJECTIVE:   Darron Tatum is a 66 year old male who presents for Preventive Visit.    He overall is doing well.  He has a couple concerns he would like to discuss today.  He has been having some ongoing issues with left knee.  He was referred to orthopedics last year however had some other health issues and did not make it.  He has had continued to have some stiffness, decreased range of motion and pain off and on.  He reports that this is worse with walking and at night.  He is able to play pickle ball and tennis without issue.  He denies any issues with the knee giving out on him.    He is due for cholesterol, diabetes screening and pneumonia vaccine.    He has a history of stage III chronic kidney disease.  He is due for recheck.  He is curious if his blood pressure meds could be contributing to this and we discussed in depth today.  Overall I do feel his blood pressure meds are beneficial and his renal function has been stable.  We will monitor for changes.    He has a history of paroxysmal atrial fibrillation.  He continues on an aspirin however has not needed rate control.  He denies any stomach upset with this.  He has had some slight blood in the stool on wiping due to irritation.  His wife inspected the area and reports that it looked like a \"skin tag\".  We discussed looking at this today however he is hesitant.  He is due for colon cancer screening.    He has had some fatigue and is curious if his iron is low.  He does give blood a couple times yearly.      Patient has been advised of split billing requirements and indicates understanding: Yes  Are you in the first 12 months of your Medicare coverage?  No    Healthy Habits:     In general, how would you rate your overall health?  Good    Frequency of exercise:  4-5 days/week    Duration of exercise:  45-60 minutes    Do you usually eat at least 4 servings of fruit and vegetables a day, include whole grains    & fiber and avoid regularly eating high fat " "or \"junk\" foods?  Yes    Taking medications regularly:  Yes    Medication side effects:  None    Ability to successfully perform activities of daily living:  No assistance needed    Home Safety:  No safety concerns identified    Hearing Impairment:  Need to ask people to speak up or repeat themselves    In the past 6 months, have you been bothered by leaking of urine?  No    In general, how would you rate your overall mental or emotional health?  Good      PHQ-2 Total Score: 0    Do you feel safe in your environment? Yes    Have you ever done Advance Care Planning? (For example, a Health Directive, POLST, or a discussion with a medical provider or your loved ones about your wishes): Yes, advance care planning is on file.       Fall risk  Fallen 2 or more times in the past year?: No  Any fall with injury in the past year?: No    Cognitive Screening   1) Repeat 3 items (Captain, Garden, Picture)    2) Clock draw: NORMAL  3) 3 item recall: Recalls 3 objects  Results: 3 items recalled: COGNITIVE IMPAIRMENT LESS LIKELY    Mini-CogTM Copyright [employer] Licensed by the author for use in [facility]; reprinted with permission (soob@[facility]). All rights reserved.      Do you have sleep apnea, excessive snoring or daytime drowsiness?: no, does snore but doesn't feel it is a problem    Reviewed and updated as needed this visit by clinical staff   Tobacco  Allergies  Meds  Problems  Med Hx  Surg Hx  Fam Hx            Reviewed and updated as needed this visit by Provider   Tobacco  Allergies  Meds  Problems  Med Hx  Surg Hx  Fam Hx           Social History     Tobacco Use     Smoking status: Never Smoker     Smokeless tobacco: Never Used   Substance Use Topics     Alcohol use: Yes     Alcohol/week: 4.2 standard drinks     Comment: glass of Wine, 4-5 times a week         Alcohol Use 9/2/2022   Prescreen: >3 drinks/day or >7 drinks/week? No     Review current opioid prescriptions    For a patient with a " current opioid prescription:    Reviewed potential Opioid Use Disorder (OUD) risk factors: Yes     Evaluate their pain severity and current treatment plan:     Provide information on non-opioid treatment options:    Refer to a specialist, as appropriate:    Get more information on pain management in the HHS Pain Management Best Practices Inter-agency Task Force Report    Screen for potential Substance Use Disorders (SUDs)    Reviewed the patient s potential risk factors for SUDs: Yes     Refer to treatment or specialist, as appropriate:     A screening tool isn t required but you may use one:  Find more information in the National [employer] on Drug Abuse Screening and Assessment Tools Chart    Current providers sharing in care for this patient include:   Patient Care Team:  Alberta Fan DO as PCP - General (Internal Medicine)  Oswald Noe MD as Assigned Surgical Provider  Alberta Fan DO as Assigned PCP  Maulik Barnett MD as Assigned Musculoskeletal Provider    The following health maintenance items are reviewed in Epic and correct as of today:  Health Maintenance Due   Topic Date Due     MICROALBUMIN  11/11/2016     COLORECTAL CANCER SCREENING  04/30/2017     COVID-19 Vaccine (4 - Booster for Moderna series) 04/28/2022     BMP  08/09/2022     LIPID  08/09/2022     INFLUENZA VACCINE (1) 09/01/2022     HEMOGLOBIN  08/09/2022     Pneumococcal Vaccine: 65+ Years (2 - PPSV23 or PCV20) 08/09/2022     Current Outpatient Medications   Medication     aspirin 81 MG EC tablet     chlorthalidone (HYGROTON) 25 MG tablet     latanoprost (XALATAN) 0.005 % ophthalmic solution     losartan (COZAAR) 50 MG tablet     Multiple Vitamin (MULTIVITAMIN ADULT PO)     timolol maleate (TIMOPTIC) 0.5 % ophthalmic solution     No current facility-administered medications for this visit.       Review of Systems   Constitutional: Negative for chills and fever.   HENT: Negative for congestion, ear pain, hearing loss and sore throat.  "   Eyes: Positive for visual disturbance. Negative for pain.        Is seeing eye dr next week   Respiratory: Negative for cough and shortness of breath.    Cardiovascular: Negative for chest pain, palpitations and peripheral edema.   Gastrointestinal: Negative for abdominal pain, constipation, diarrhea, heartburn, hematochezia and nausea.   Genitourinary: Negative for dysuria, genital sores, hematuria, impotence, penile discharge and urgency.   Musculoskeletal: Positive for arthralgias and joint swelling. Negative for myalgias.        Chronic knee pain and sometimes hand and 1st MCP   Skin: Negative for rash.   Neurological: Negative for dizziness, weakness, headaches and paresthesias.   Psychiatric/Behavioral: Negative for mood changes. The patient is not nervous/anxious.        OBJECTIVE:   /70 (BP Location: Right arm, Patient Position: Sitting, Cuff Size: Adult Large)   Pulse 72   Temp (!) 96.2  F (35.7  C) (Temporal)   Resp 16   Ht 1.895 m (6' 2.6\")   Wt 89.4 kg (197 lb)   SpO2 100%   BMI 24.89 kg/m   Estimated body mass index is 24.89 kg/m  as calculated from the following:    Height as of this encounter: 1.895 m (6' 2.6\").    Weight as of this encounter: 89.4 kg (197 lb).  Physical Exam  GEN: Vitals reviewed. Healthy appearing. Patient is in no acute distress. Cooperative with exam.  HEENT: Normocephalic atraumatic.  Eyes grossly normal to inspection.  No discharge or erythema, or obvious scleral/conjunctival abnormalities.  NECK: Supple; no thyromegaly or masses noted.  No cervical or supraclavicular lymphadenopathy.  CV: Heart regular in rate and rhythm with no murmur.    LUNGS: No audible wheeze, cough, or visible cyanosis.  No visible retractions or increased work of breathing.  Lungs clear to auscultation bilaterally.    ABD:  Nondistended  SKIN: Warm and dry to touch.  Visible skin clear. No significant rash, abnormal pigmentation or lesions.  EXT: No clubbing or cyanosis.  No peripheral " edema.  NEURO: Alert and oriented to person, place, and time.  Cranial nerves II-XII grossly intact with no focal or lateralizing deficits.  Muscle tone normal.  Gait normal. No tremor.   MSK: ROM of upper and lower ext symmetric and full.  PSYCH: Mood is good.  Mentation appears normal, affect normal/bright, judgement and insight intact, normal speech and appearance well-groomed.      Diagnostic Test Results:  Labs reviewed in Epic    ASSESSMENT / PLAN:   1. Paroxysmal atrial fibrillation (H)  -Overall controlled.  No new symptoms.  - Ferritin; Future  - Vitamin B12; Future    2. Thrombocytopenia (H)  Most recent CBC with normal platelets however given his fatigue we will recheck this today.    3. Stage 3a chronic kidney disease (H)  Recheck today for stability  - CBC with platelets; Future  - Magnesium; Future  - Albumin Random Urine Quantitative with Creat Ratio; Future  - Ferritin; Future  - Iron binding panel; Future    4. Essential hypertension  - Blood pressure today of 126/70   is at the goal of <140/90 with no exacerbation.  - Continue current regimen.  Instructed to check BP at home.  - Cautioned patient to monitor with antibiotics, herbals and any OTC medications  - electrolytes and renal function done  - chlorthalidone (HYGROTON) 25 MG tablet; Take 0.5 tablets (12.5 mg) by mouth daily  Dispense: 45 tablet; Refill: 4  - losartan (COZAAR) 50 MG tablet; Take 1 tablet (50 mg) by mouth daily  Dispense: 90 tablet; Refill: 4  - Ferritin; Future    5. Screening for diabetes mellitus  - Comprehensive metabolic panel; Future    6. Screening for hyperlipidemia  - Lipid Profile; Future    7. Screening for colon cancer  - Colonoscopy Screening  Referral; Future    8. Need for pneumococcal vaccination  -Given today    9. Chronic pain of left knee  We discussed options for his knee including physical therapy, injections or referral back to orthopedics.  He will think about these and in the meantime we will get  "an x-ray.  - XR Knee Left 1/2 Views; Future      Patient has been advised of split billing requirements and indicates understanding: Yes    COUNSELING:  Reviewed preventive health counseling, as reflected in patient instructions    Estimated body mass index is 24.89 kg/m  as calculated from the following:    Height as of this encounter: 1.895 m (6' 2.6\").    Weight as of this encounter: 89.4 kg (197 lb).      He reports that he has never smoked. He has never used smokeless tobacco.      Appropriate preventive services were discussed with this patient, including applicable screening as appropriate for cardiovascular disease, diabetes, osteopenia/osteoporosis, and glaucoma.  As appropriate for age/gender, discussed screening for colorectal cancer, prostate cancer, breast cancer, and cervical cancer. Checklist reviewing preventive services available has been given to the patient.    Reviewed patients plan of care and provided an AVS. The Basic Care Plan (routine screening as documented in Health Maintenance) for Darron meets the Care Plan requirement. This Care Plan has been established and reviewed with the Patient.    Counseling Resources:  ATP IV Guidelines  Pooled Cohorts Equation Calculator  Breast Cancer Risk Calculator  Breast Cancer: Medication to Reduce Risk  FRAX Risk Assessment  ICSI Preventive Guidelines  Dietary Guidelines for Americans, 2010  [employer]'s MyPlate  ASA Prophylaxis  Lung CA Screening    Alberta Fan DO  [facility]    Identified Health Risks:  Answers for HPI/ROS submitted by the patient on 9/2/2022  If you checked off any problems, how difficult have these problems made it for you to do your work, take care of things at home, or get along with other people?: Not difficult at all  PHQ9 TOTAL SCORE: 2        The patient was provided with written information regarding signs of hearing loss.  "

## 2022-09-06 NOTE — PATIENT INSTRUCTIONS
Patient Education   Personalized Prevention Plan  You are due for the preventive services outlined below.  Your care team is available to assist you in scheduling these services.  If you have already completed any of these items, please share that information with your care team to update in your medical record.  Health Maintenance Due   Topic Date Due     Kidney Microalbumin Urine Test  11/11/2016     Colorectal Cancer Screening  04/30/2017     COVID-19 Vaccine (4 - Booster for Moderna series) 04/28/2022     Basic Metabolic Panel  08/09/2022     Cholesterol Lab  08/09/2022     Flu Vaccine (1) 09/01/2022     Hemoglobin  08/09/2022     Pneumococcal Vaccine (2 - PPSV23 or PCV20) 08/09/2022       Signs of Hearing Loss      Hearing much better with one ear can be a sign of hearing loss.   Hearing loss is a problem shared by many people. In fact, it is one of the most common health problems, particularly as people age. Most people age 65 and older have some hearing loss. By age 80, almost everyone does. Hearing loss often occurs slowly over the years. So you may not realize your hearing has gotten worse.  Have your hearing checked  Call your healthcare provider if you:    Have to strain to hear normal conversation    Have to watch other people s faces very carefully to follow what they re saying    Need to ask people to repeat what they ve said    Often misunderstand what people are saying    Turn the volume of the television or radio up so high that others complain    Feel that people are mumbling when they re talking to you    Find that the effort to hear leaves you feeling tired and irritated    Notice, when using the phone, that you hear better with one ear than the other  [facility] last reviewed this educational content on 1/1/2020 2000-2021 [employer]. All rights reserved. This information is not intended as a substitute for professional medical care. Always follow your healthcare professional's  instructions.

## 2022-09-06 NOTE — NURSING NOTE
Patient presents to clinic today for annual Medicare wellness visit.  Medication reconciliation completed.    ACP on file? yes    FOOD SECURITY SCREENING QUESTIONS    The next two questions are to help us understand your food security.  If you are feeling you need any assistance in this area, we have resources available to support you today.    Hunger Vital Signs:   Within the past 12 months we worried whether our food would run out before we got money to buy more. Never  Within the past 12 months the food we bought just didn't last and we didn't have money to get more. Never    Toshia Carranza CMA(AAMA)..................9/6/2022   1:30 PM

## 2022-09-07 LAB
CREAT UR-MCNC: 61 MG/DL
MICROALBUMIN UR-MCNC: <12 MG/L
MICROALBUMIN/CREAT UR: NORMAL MG/G{CREAT}

## 2022-09-07 RX ORDER — LOSARTAN POTASSIUM 50 MG/1
TABLET ORAL
Qty: 90 TABLET | Refills: 0 | OUTPATIENT
Start: 2022-09-07

## 2022-09-07 NOTE — TELEPHONE ENCOUNTER
Patient's chart was accessed to determine the status of a refill request - nothing needed at this time as the request was previously addressed.    Lara Jain RN .............. 9/7/2022  12:50 PM

## 2022-10-03 ENCOUNTER — MYC MEDICAL ADVICE (OUTPATIENT)
Dept: FAMILY MEDICINE | Facility: OTHER | Age: 67
End: 2022-10-03

## 2022-10-18 NOTE — PATIENT INSTRUCTIONS
Preparing for Your Surgery  Getting started  A nurse will call you to review your health history and instructions. They will give you an arrival time based on your scheduled surgery time. Please be ready to share:    Your doctor's clinic name and phone number    Your medical, surgical and anesthesia history    A list of allergies and sensitivities    A list of medicines, including herbal treatments and over-the-counter drugs    Whether the patient has a legal guardian (ask how to send us the papers in advance)  Please tell us if you're pregnant--or if there's any chance you might be pregnant. Some surgeries may injure a fetus (unborn baby), so they require a pregnancy test. Surgeries that are safe for a fetus don't always need a test, and you can choose whether to have one.   If you have a child who's having surgery, please ask for a copy of Preparing for Your Child's Surgery.    Preparing for surgery    Within 10 to 30 days of surgery: Have a pre-op exam (sometimes called an H&P, or History and Physical). This can be done at a clinic or pre-operative center.  ? If you're having a , you may not need this exam. Talk to your care team.    At your pre-op exam, talk to your care team about all medicines you take. If you need to stop any medicines before surgery, ask when to start taking them again.  ? We do this for your safety. Many medicines can make you bleed too much during surgery. Some change how well surgery (anesthesia) drugs work.    Call your insurance company to let them know you're having surgery. (If you don't have insurance, call [phone].)    Call your clinic if there's any change in your health. This includes signs of a cold or flu (sore throat, runny nose, cough, rash, fever). It also includes a scrape or scratch near the surgery site.    If you have questions on the day of surgery, call your hospital or surgery center.  COVID testing  You may need to be tested for COVID-19 before having  surgery. If so, we will give you instructions (or click here).  Eating and drinking guidelines  For your safety: Unless your surgeon tells you otherwise, follow the guidelines below.    Eat and drink as usual until 8 hours before surgery. After that, no food or milk.    Drink clear liquids until 2 hours before surgery. These are liquids you can see through, like water, Gatorade and Propel Water. You may also have black coffee and tea (no cream or milk).    Nothing by mouth within 2 hours of surgery. This includes gum, candy and breath mints.    If you drink alcohol: Stop drinking it the night before surgery.    If your care team tells you to take medicine on the morning of surgery, it's okay to take it with a sip of water.  Preventing infection    Shower or bathe the night before and morning of your surgery. Follow the instructions your clinic gave you. (If no instructions, use regular soap.)    Don't shave or clip hair near your surgery site. We'll remove the hair if needed.    Don't smoke or vape the morning of surgery. You may chew nicotine gum up to 2 hours before surgery. A nicotine patch is okay.  ? Note: Some surgeries require you to completely quit smoking and nicotine. Check with your surgeon.    Your care team will make every effort to keep you safe from infection. We will:  ? Clean our hands often with soap and water (or an alcohol-based hand rub).  ? Clean the skin at your surgery site with a special soap that kills germs.  ? Give you a special gown to keep you warm. (Cold raises the risk of infection.)  ? Wear special hair covers, masks, gowns and gloves during surgery.  ? Give antibiotic medicine, if prescribed. Not all surgeries need antibiotics.  What to bring on the day of surgery    Photo ID and insurance card    Copy of your health care directive, if you have one    Glasses and hearing aides (bring cases)  ? You can't wear contacts during surgery    Inhaler and eye drops, if you use them (tell us  about these when you arrive)    CPAP machine or breathing device, if you use them    A few personal items, if spending the night    If you have . . .  ? A pacemaker, ICD (cardiac defibrillator) or other implant: Bring the ID card.  ? An implanted stimulator: Bring the remote control.  ? A legal guardian: Bring a copy of the certified (court-stamped) guardianship papers.  Please remove any jewelry, including body piercings. Leave jewelry and other valuables at home.  If you're going home the day of surgery    You must have a responsible adult drive you home. They should stay with you overnight as well.    If you don't have someone to stay with you, and you aren't safe to go home alone, we may keep you overnight. Insurance often won't pay for this.  After surgery  If it's hard to control your pain or you need more pain medicine, please call your surgeon's office.  Questions?   If you have any questions for your care team, list them here: _________________________________________________________________________________________________________________________________________________________________________ ____________________________________ ____________________________________ ____________________________________  For informational purposes only. Not to replace the advice of your health care provider. Copyright   2003, 2019 [facility]. All rights reserved. Clinically reviewed by Linda Wyatt MD. [employer] [id number] - REV 07/22.

## 2022-10-18 NOTE — PROGRESS NOTES
[facility]  [address]  [place] [address]  Phone: [phone]  Primary Provider: Alberta Fan  Pre-op Performing Provider: RENEA HU    PREOPERATIVE EVALUATION:  Today's date: 10/19/2022    Darron Tatum is a 66 year old male who presents for a preoperative evaluation.    Surgical Information:  Surgery/Procedure:    Cataract Surgery - R Eye -10/26/22, L Eye - 11/9/22                                      Colonoscopy - 10/20/22 - Dr. Cortes  Surgery Location[facility]  [facility]  Surgeon:   Dr. De Santiago  Surgery Date:   10/26/22  Time of Surgery:  TBD  Where patient plans to recover: At home with family  Fax number for surgical facility: on file    Type of Anesthesia Anticipated: to be determined    Assessment & Plan     The proposed surgical procedure is considered LOW risk.    Senile cataract, unspecified age-related cataract type, unspecified laterality  Preop general physical exam  - CBC with Platelets & Differential ([facility] Only)  - Comprehensive Metabolic Panel    Risks and Recommendations:  The patient has the following additional risks and recommendations for perioperative complications:   - No identified additional risk factors other than previously addressed    Medication Instructions:  Patient is to take all scheduled medications on the day of surgery EXCEPT for modifications listed below:   - aspirin: Discontinue aspirin 7-10 days prior to procedure to reduce bleeding risk. It should be resumed postoperatively.    - ACE/ARB: May be continued on the day of surgery.    - Diuretics: May be continued on the day of surgery.    RECOMMENDATION:  APPROVAL GIVEN to proceed with proposed procedure, without further diagnostic evaluation.    20 minutes spent on the date of the encounter doing chart review, history and exam, documentation and further activities per the note    Subjective     HPI related to upcoming procedure: cataract of both  eyes    Preop Questions 10/19/2022   1. Have you ever had a heart attack or stroke? No   2. Have you ever had surgery on your heart or blood vessels, such as a stent placement, a coronary artery bypass, or surgery on an artery in your head, neck, heart, or legs? YES    3. Do you have chest pain with activity? No   4. Do you have a history of  heart failure? No   5. Do you currently have a cold, bronchitis or symptoms of other infection? No   6. Do you have a cough, shortness of breath, or wheezing? No   7. Do you or anyone in your family have previous history of blood clots? YES    8. Do you or does anyone in your family have a serious bleeding problem such as prolonged bleeding following surgeries or cuts? No   9. Have you ever had problems with anemia or been told to take iron pills? No   10. Have you had any abnormal blood loss such as black, tarry or bloody stools? No   11. Have you ever had a blood transfusion? No   12. Are you willing to have a blood transfusion if it is medically needed before, during, or after your surgery? Yes   13. Have you or any of your relatives ever had problems with anesthesia? UNKNOWN    14. Do you have sleep apnea, excessive snoring or daytime drowsiness? No   15. Do you have any artifical heart valves or other implanted medical devices like a pacemaker, defibrillator, or continuous glucose monitor? No   16. Do you have artificial joints? No   17. Are you allergic to latex? No     Health Care Directive:  Patient has a Health Care Directive on file    Preoperative Review of :   reviewed - no record of controlled substances prescribed.    Status of Chronic Conditions:  See problem list for active medical problems.  Problems all longstanding and stable, except as noted/documented.  See ROS for pertinent symptoms related to these conditions.      Review of Systems  Constitutional, neuro, ENT, endocrine, pulmonary, cardiac, gastrointestinal, genitourinary, musculoskeletal,  integument and psychiatric systems are negative, except as otherwise noted.    Patient Active Problem List    Diagnosis Date Noted     Chronic kidney disease, stage 3 (H) 08/09/2021     Priority: Medium     Thrombocytopenia (H) 03/18/2019     Priority: Medium     Paroxysmal atrial fibrillation (H) - hx of cardioversion 11/09/2017     Priority: Medium     Benign prostatic hyperplasia 09/26/2013     Priority: Medium      Past Medical History:   Diagnosis Date     Atrial fibrillation (H) 10/31/2014     Calculus of kidney     passed spontaneous, age of 30     Enlarged prostate without lower urinary tract symptoms (luts) 09/26/2013     HTN (hypertension) 11/13/2019     Injury of hand, left, initial encounter 10/2021    fracture and laceration - splitting wood     Mixed hyperlipidemia 03/18/2019     Other specified glaucoma      Squamous cell carcinoma of skin of face      Past Surgical History:   Procedure Laterality Date     CARDIOVERSION  02/2017     COLONOSCOPY  2017     EP ABLATION ATRIAL FLUTTER  01/28/2020     FINGER SURGERY Left 2021    repair, left long finger injury     HERNIA REPAIR Right 1998     REPAIR RETINACULAR OPEN MEDIAL OR LATERAL      Retinal hole     Current Outpatient Medications   Medication Sig Dispense Refill     aspirin 81 MG EC tablet Take 81 mg by mouth daily       chlorthalidone (HYGROTON) 25 MG tablet Take 0.5 tablets (12.5 mg) by mouth daily 45 tablet 4     latanoprost (XALATAN) 0.005 % ophthalmic solution Place 1 drop into both eyes daily       losartan (COZAAR) 50 MG tablet Take 1 tablet (50 mg) by mouth daily 90 tablet 4     Multiple Vitamin (MULTIVITAMIN ADULT PO)        timolol maleate (TIMOPTIC) 0.5 % ophthalmic solution Place 1 drop into the right eye 2 times daily         Allergies   Allergen Reactions     Alphagan P Other (See Comments)     Other reaction(s): Conjunctivitis        Social History     Tobacco Use     Smoking status: Never     Smokeless tobacco: Never   Substance Use  "Topics     Alcohol use: Yes     Alcohol/week: 4.2 standard drinks     Comment: glass of Wine, 4-5 times a week     Family History   Problem Relation Age of Onset     Hypertension Mother      Brain Cancer Mother 63     Enlarged prostate Father      Peptic Ulcer Disease Father      Dementia Father      Other - See Comments Brother         detached retina     Skin Cancer Brother         melanoma type     Enlarged prostate Brother      Other - See Comments Brother         detached retina     Atrial fibrillation Brother      Osteopenia Sister      History   Drug Use No         Objective     /72 (BP Location: Right arm, Patient Position: Sitting, Cuff Size: Adult Regular)   Temp 97.6  F (36.4  C) (Temporal)   Resp 14   Ht 1.899 m (6' 2.75\")   Wt 88.5 kg (195 lb)   BMI 24.54 kg/m      Physical Exam    GENERAL APPEARANCE: healthy, alert and no distress     RESP: lungs clear to auscultation - no rales, rhonchi or wheezes     CV: regular rates and rhythm, normal S1 S2, no S3 or S4 and no murmur, click or rub     ABDOMEN:  soft, nontender, no HSM or masses and bowel sounds normal     MS: extremities normal- no gross deformities noted, no evidence of inflammation in joints, FROM in all extremities.     SKIN: no suspicious lesions or rashes     NEURO: Normal strength and tone, sensory exam grossly normal, mentation intact and speech normal     PSYCH: mentation appears normal. and affect normal/bright    Recent Labs   Lab Test 09/06/22  1435 08/09/21  1110   HGB 13.6 13.8    187    138   POTASSIUM 3.3* 4.2   CR 1.32* 1.38*      Diagnostics:  Recent Results (from the past 24 hour(s))   Comprehensive Metabolic Panel    Collection Time: 10/19/22  8:16 AM   Result Value Ref Range    Sodium 139 134 - 144 mmol/L    Potassium 3.9 3.5 - 5.1 mmol/L    Chloride 101 98 - 107 mmol/L    Carbon Dioxide (CO2) 32 (H) 21 - 31 mmol/L    Anion Gap 6 3 - 14 mmol/L    Urea Nitrogen 16 7 - 25 mg/dL    Creatinine 1.39 (H) 0.70 - " 1.30 mg/dL    Calcium 10.0 8.6 - 10.3 mg/dL    Glucose 124 (H) 70 - 105 mg/dL    Alkaline Phosphatase 59 34 - 104 U/L    AST 23 13 - 39 U/L    ALT 15 7 - 52 U/L    Protein Total 7.1 6.4 - 8.9 g/dL    Albumin 4.2 3.5 - 5.7 g/dL    Bilirubin Total 0.7 0.3 - 1.0 mg/dL    GFR Estimate 56 (L) >60 mL/min/1.73m2   CBC with platelets and differential    Collection Time: 10/19/22  8:16 AM   Result Value Ref Range    WBC Count 6.0 4.0 - 11.0 10e3/uL    RBC Count 4.42 4.40 - 5.90 10e6/uL    Hemoglobin 14.2 13.3 - 17.7 g/dL    Hematocrit 40.9 40.0 - 53.0 %    MCV 93 78 - 100 fL    MCH 32.1 26.5 - 33.0 pg    MCHC 34.7 31.5 - 36.5 g/dL    RDW 12.0 10.0 - 15.0 %    Platelet Count 190 150 - 450 10e3/uL    % Neutrophils 63 %    % Lymphocytes 26 %    % Monocytes 8 %    % Eosinophils 2 %    % Basophils 1 %    % Immature Granulocytes 0 %    NRBCs per 100 WBC 0 <1 /100    Absolute Neutrophils 3.8 1.6 - 8.3 10e3/uL    Absolute Lymphocytes 1.5 0.8 - 5.3 10e3/uL    Absolute Monocytes 0.5 0.0 - 1.3 10e3/uL    Absolute Eosinophils 0.1 0.0 - 0.7 10e3/uL    Absolute Basophils 0.0 0.0 - 0.2 10e3/uL    Absolute Immature Granulocytes 0.0 <=0.4 10e3/uL    Absolute NRBCs 0.0 10e3/uL      No EKG required for low risk surgery (cataract, skin procedure, breast biopsy, etc).    Revised Cardiac Risk Index (RCRI):  The patient has the following serious cardiovascular risks for perioperative complications:   - No serious cardiac risks = 0 points     RCRI Interpretation: 0 points: Class I (very low risk - 0.4% complication rate)       Signed Electronically by: Teresa Mckenna NP  Copy of this evaluation report is provided to requesting physician.

## 2022-10-19 ENCOUNTER — OFFICE VISIT (OUTPATIENT)
Dept: INTERNAL MEDICINE | Facility: OTHER | Age: 67
End: 2022-10-19
Attending: NURSE PRACTITIONER
Payer: MEDICARE

## 2022-10-19 VITALS
BODY MASS INDEX: 24.25 KG/M2 | DIASTOLIC BLOOD PRESSURE: 72 MMHG | RESPIRATION RATE: 14 BRPM | WEIGHT: 195 LBS | TEMPERATURE: 97.6 F | SYSTOLIC BLOOD PRESSURE: 122 MMHG | HEIGHT: 75 IN

## 2022-10-19 DIAGNOSIS — H25.9 SENILE CATARACT, UNSPECIFIED AGE-RELATED CATARACT TYPE, UNSPECIFIED LATERALITY: Primary | ICD-10-CM

## 2022-10-19 DIAGNOSIS — Z01.818 PREOP GENERAL PHYSICAL EXAM: ICD-10-CM

## 2022-10-19 LAB
ALBUMIN SERPL-MCNC: 4.2 G/DL (ref 3.5–5.7)
ALP SERPL-CCNC: 59 U/L (ref 34–104)
ALT SERPL W P-5'-P-CCNC: 15 U/L (ref 7–52)
ANION GAP SERPL CALCULATED.3IONS-SCNC: 6 MMOL/L (ref 3–14)
AST SERPL W P-5'-P-CCNC: 23 U/L (ref 13–39)
BASOPHILS # BLD AUTO: 0 10E3/UL (ref 0–0.2)
BASOPHILS NFR BLD AUTO: 1 %
BILIRUB SERPL-MCNC: 0.7 MG/DL (ref 0.3–1)
BUN SERPL-MCNC: 16 MG/DL (ref 7–25)
CALCIUM SERPL-MCNC: 10 MG/DL (ref 8.6–10.3)
CHLORIDE BLD-SCNC: 101 MMOL/L (ref 98–107)
CO2 SERPL-SCNC: 32 MMOL/L (ref 21–31)
CREAT SERPL-MCNC: 1.39 MG/DL (ref 0.7–1.3)
EOSINOPHIL # BLD AUTO: 0.1 10E3/UL (ref 0–0.7)
EOSINOPHIL NFR BLD AUTO: 2 %
ERYTHROCYTE [DISTWIDTH] IN BLOOD BY AUTOMATED COUNT: 12 % (ref 10–15)
GFR SERPL CREATININE-BSD FRML MDRD: 56 ML/MIN/1.73M2
GLUCOSE BLD-MCNC: 124 MG/DL (ref 70–105)
HCT VFR BLD AUTO: 40.9 % (ref 40–53)
HGB BLD-MCNC: 14.2 G/DL (ref 13.3–17.7)
HOLD SPECIMEN: NORMAL
IMM GRANULOCYTES # BLD: 0 10E3/UL
IMM GRANULOCYTES NFR BLD: 0 %
LYMPHOCYTES # BLD AUTO: 1.5 10E3/UL (ref 0.8–5.3)
LYMPHOCYTES NFR BLD AUTO: 26 %
MCH RBC QN AUTO: 32.1 PG (ref 26.5–33)
MCHC RBC AUTO-ENTMCNC: 34.7 G/DL (ref 31.5–36.5)
MCV RBC AUTO: 93 FL (ref 78–100)
MONOCYTES # BLD AUTO: 0.5 10E3/UL (ref 0–1.3)
MONOCYTES NFR BLD AUTO: 8 %
NEUTROPHILS # BLD AUTO: 3.8 10E3/UL (ref 1.6–8.3)
NEUTROPHILS NFR BLD AUTO: 63 %
NRBC # BLD AUTO: 0 10E3/UL
NRBC BLD AUTO-RTO: 0 /100
PLATELET # BLD AUTO: 190 10E3/UL (ref 150–450)
POTASSIUM BLD-SCNC: 3.9 MMOL/L (ref 3.5–5.1)
PROT SERPL-MCNC: 7.1 G/DL (ref 6.4–8.9)
RBC # BLD AUTO: 4.42 10E6/UL (ref 4.4–5.9)
SODIUM SERPL-SCNC: 139 MMOL/L (ref 134–144)
WBC # BLD AUTO: 6 10E3/UL (ref 4–11)

## 2022-10-19 PROCEDURE — 80053 COMPREHEN METABOLIC PANEL: CPT | Mod: ZL | Performed by: NURSE PRACTITIONER

## 2022-10-19 PROCEDURE — 85014 HEMATOCRIT: CPT | Mod: ZL | Performed by: NURSE PRACTITIONER

## 2022-10-19 PROCEDURE — 36415 COLL VENOUS BLD VENIPUNCTURE: CPT | Mod: ZL | Performed by: NURSE PRACTITIONER

## 2022-10-19 PROCEDURE — 99214 OFFICE O/P EST MOD 30 MIN: CPT | Performed by: NURSE PRACTITIONER

## 2022-10-19 PROCEDURE — G0463 HOSPITAL OUTPT CLINIC VISIT: HCPCS

## 2022-10-19 PROCEDURE — 82040 ASSAY OF SERUM ALBUMIN: CPT | Mod: ZL | Performed by: NURSE PRACTITIONER

## 2022-10-19 RX ORDER — POLYETHYLENE GLYCOL 3350, SODIUM CHLORIDE, SODIUM BICARBONATE, POTASSIUM CHLORIDE 420; 11.2; 5.72; 1.48 G/4L; G/4L; G/4L; G/4L
POWDER, FOR SOLUTION ORAL
COMMUNITY
Start: 2022-09-07 | End: 2023-09-06

## 2022-10-19 RX ORDER — PREDNISOLONE ACETATE 10 MG/ML
SUSPENSION/ DROPS OPHTHALMIC
COMMUNITY
Start: 2022-10-11 | End: 2023-09-06

## 2022-10-19 RX ORDER — KETOROLAC TROMETHAMINE 5 MG/ML
SOLUTION OPHTHALMIC
COMMUNITY
Start: 2022-10-11 | End: 2023-09-06

## 2022-10-19 RX ORDER — MOXIFLOXACIN 5 MG/ML
SOLUTION/ DROPS OPHTHALMIC
COMMUNITY
Start: 2022-10-11 | End: 2023-09-06

## 2022-10-19 ASSESSMENT — PAIN SCALES - GENERAL: PAINLEVEL: NO PAIN (0)

## 2022-10-19 NOTE — NURSING NOTE
"Chief Complaint   Patient presents with     Pre-Op Exam     Cataract Surgery - R Eye - 10/26/22,  L Eye 11/9/22; Dr. De Santiago, [facility]; [facility]  Colonoscopy - 10/20/22, Dr. Cortes; [facility]       Initial /72 (BP Location: Right arm, Patient Position: Sitting, Cuff Size: Adult Regular)   Temp 97.6  F (36.4  C) (Temporal)   Resp 14   Ht 1.899 m (6' 2.75\")   Wt 88.5 kg (195 lb)   BMI 24.54 kg/m   Estimated body mass index is 24.54 kg/m  as calculated from the following:    Height as of this encounter: 1.899 m (6' 2.75\").    Weight as of this encounter: 88.5 kg (195 lb).     Medication Reconciliation: complete      FOOD SECURITY SCREENING QUESTIONS:    The next two questions are to help us understand your food security.  If you are feeling you need any assistance in this area, we have resources available to support you today.    Hunger Vital Signs:  Within the past 12 months we worried whether our food would run out before we got money to buy more. Never  Within the past 12 months the food we bought just didn't last and we didn't have money to get more. Never        Advance care plan reviewed      Sobeida Lopez LPN on 10/19/2022 at 8:25 AM      "

## 2022-10-20 ENCOUNTER — TRANSFERRED RECORDS (OUTPATIENT)
Dept: HEALTH INFORMATION MANAGEMENT | Facility: OTHER | Age: 67
End: 2022-10-20

## 2023-04-03 ENCOUNTER — MYC MEDICAL ADVICE (OUTPATIENT)
Dept: INTERNAL MEDICINE | Facility: OTHER | Age: 68
End: 2023-04-03
Payer: MEDICARE

## 2023-04-03 DIAGNOSIS — M25.562 CHRONIC PAIN OF LEFT KNEE: Primary | ICD-10-CM

## 2023-04-03 DIAGNOSIS — G89.29 CHRONIC PAIN OF LEFT KNEE: Primary | ICD-10-CM

## 2023-05-03 ENCOUNTER — OFFICE VISIT (OUTPATIENT)
Dept: ORTHOPEDICS | Facility: OTHER | Age: 68
End: 2023-05-03
Attending: INTERNAL MEDICINE
Payer: MEDICARE

## 2023-05-03 VITALS
DIASTOLIC BLOOD PRESSURE: 72 MMHG | OXYGEN SATURATION: 96 % | WEIGHT: 198.2 LBS | HEART RATE: 70 BPM | SYSTOLIC BLOOD PRESSURE: 116 MMHG | RESPIRATION RATE: 16 BRPM | BODY MASS INDEX: 24.94 KG/M2

## 2023-05-03 DIAGNOSIS — M25.562 CHRONIC PAIN OF LEFT KNEE: ICD-10-CM

## 2023-05-03 DIAGNOSIS — G89.29 CHRONIC PAIN OF LEFT KNEE: ICD-10-CM

## 2023-05-03 PROCEDURE — G0463 HOSPITAL OUTPT CLINIC VISIT: HCPCS

## 2023-05-03 PROCEDURE — 99203 OFFICE O/P NEW LOW 30 MIN: CPT | Performed by: ORTHOPAEDIC SURGERY

## 2023-05-03 ASSESSMENT — PAIN SCALES - GENERAL: PAINLEVEL: MILD PAIN (3)

## 2023-05-03 NOTE — PROGRESS NOTES
Visit Date: 05/03/2023    REASON FOR EVALUATION:  Left knee pain.    HISTORY OF PRESENT ILLNESS:  Darron comes in with regards to left knee pain.  It has been ongoing here for a couple years, getting a little bit worse here over time.  Seems to be worse with walking long distances.  He is okay playing things like pickleball here.  Pain is on the medial side.  Gets occasional swelling.  He is still doing his activities here, but certainly not back to complete and normalized function here at this point in time.  He is here look at next steps and options here moving forward.  The patient denies any other major concerns to me here today.  He has not tried injections.  Did recently get set up on some formalized therapy.  He is looking forward to that.  Does feel like he is already seeing a little bit of improvement just doing his home care therapy.    MEDICATIONS:  Reviewed.    ALLERGIES:  ALSO REVIEWED.    REVIEW OF SYSTEMS:  A 12-point otherwise negative with the exception stated above.    PHYSICAL EXAMINATION:  The patient is 6 feet 2 inches, 195 pounds.  Alert and oriented x3, cooperative with exam, in no distress.  Does ambulate with satisfactory gait.  Affect is appropriate.    PHYSICAL EXAMINATION:  Left knee shows tenderness across medial joint line, slight varus deformity.  Neurovascular examination intact.  Range of motion about -2 or 3 to about 125.  Limb exam stable and balanced.  No pain with Raymundo testing at this time.  Minimal pain associated with hip range of motion.  Quad strength 5/5.  Kneecap tracking acceptable there as well.    IMAGING:  X-rays reviewed, shows moderate arthrosis medial compartment as well as patellofemoral joint at this time.    IMPRESSION:  Left knee moderate arthrosis.    PLAN:  At this time, recommendation is that he does continue conservative treatment, occasional injections, occasional anti-inflammatories and therapy to start with.  Certainly if things do ramp up, we can look  at injections down the road.  He is going to keep me in the loop for that process at this point in time.  Long-term management and potential joint replacement if symptoms continue to progress here over time.    Collins Trejo MD        D: 2023   T: 2023   MT: [id number]    Name:     EMILY RIZO  MRN:      [id number]        Account:    [id number]   :      1955           Visit Date: 2023     Document: [id number]

## 2023-05-04 ENCOUNTER — HOSPITAL ENCOUNTER (OUTPATIENT)
Dept: PHYSICAL THERAPY | Facility: OTHER | Age: 68
Setting detail: THERAPIES SERIES
Discharge: HOME | End: 2023-05-04
Attending: INTERNAL MEDICINE
Payer: MEDICARE

## 2023-05-04 DIAGNOSIS — G89.29 CHRONIC PAIN OF LEFT KNEE: ICD-10-CM

## 2023-05-04 DIAGNOSIS — M25.562 CHRONIC PAIN OF LEFT KNEE: ICD-10-CM

## 2023-05-04 PROCEDURE — 97161 PT EVAL LOW COMPLEX 20 MIN: CPT | Mod: GP

## 2023-05-04 PROCEDURE — 97110 THERAPEUTIC EXERCISES: CPT | Mod: GP

## 2023-05-04 NOTE — PROGRESS NOTES
[facility]    OUTPATIENT PHYSICAL THERAPY ORTHOPEDIC EVALUATION  PLAN OF TREATMENT FOR OUTPATIENT REHABILITATION  (COMPLETE FOR INITIAL CLAIMS ONLY)  Patient's Last Name, First Name, M.I.  YOB: 1955  Darron Tatum    Provider s Name:  [facility]   Medical Record No.  [id number]   Start of Care Date:  05/04/23   Onset Date:      Type:     _X__PT   ___OT   ___SLP Medical Diagnosis:  M25.562, G89.29 (ICD-10-CM) - Chronic pain of left knee     PT Diagnosis:  L knee pain   Visits from SOC:  1      _________________________________________________________________________________  Plan of Treatment/Functional Goals:  gait training, joint mobilization, manual therapy, ROM, strengthening, stretching     Cryotherapy, TENS, Ultrasound, Hot packs  vasopneumatic compression as needed  Goals  Goal Identifier: L knee ROM  Goal Description: Patient will increase L knee ROM to -5-135 to be equate R knee ROM and decrease overall stiffness  Target Date: 06/01/23    Goal Identifier: Proximal hip strength  Goal Description: Patient will increase L hip strength to 5/5 in all directions to increase stance leg stability and decrease stress of L knee  Target Date: 06/15/23    Goal Identifier: HEP  Goal Description: Patient will be independent with HEP and appropriate progressions within 2 weeks  Target Date: 05/18/23    Goal Identifier: Gait/Walking distance  Goal Description: Patient will report ability to ambulate >2 miles x3 week without rest breaks and pain <2/10  Target Date: 06/15/23    Therapy Frequency:  2 times/Week  Predicted Duration of Therapy Intervention:  6 weeks    Soila Cordova PT                 I CERTIFY THE NEED FOR THESE SERVICES FURNISHED UNDER        THIS PLAN OF TREATMENT AND WHILE UNDER MY CARE     (Physician co-signature of this document indicates review and  certification of the therapy plan).                     Certification Date From:  05/04/23   Certification Date To:  07/27/23    Referring Provider:  DO Mita    Initial Assessment        See Epic Evaluation Start of Care Date: 05/04/23

## 2023-05-04 NOTE — PROGRESS NOTES
"   05/04/23 0800   General Information   Type of Visit Initial OP Ortho PT Evaluation   Start of Care Date 05/04/23   Referring Physician Mita, DO   Patient/Family Goals Statement \"I would like to be able to feel comfortable for a walk\" goal is a couple miles   Orders Evaluate and Treat   Date of Order 04/10/23   Certification Required? Yes   Medical Diagnosis M25.562, G89.29 (ICD-10-CM) - Chronic pain of left knee   Surgical/Medical history reviewed Yes   Precautions/Limitations no known precautions/limitations   Body Part(s)   Body Part(s) Knee   Presentation and Etiology   Pertinent history of current problem (include personal factors and/or comorbidities that impact the POC) Patient endorses L knee pain. Reports pain is mostly with long distance ambulation and change of surfaces. States he can still play pickleball and the quick movements (\"spurt running\") doesn't bother but if he plants his leg and twists will have discomfort. He reorts earlier this winter he started doing exercises (given from brother who was seeing therapy for similar pain) and has noticed less pain. Reports last week walked 3x for a mile with less pain (before was going a 1/4 mile prior to needing to stop). Reports he feels like his walk is different (\"gimpy\") when pain starts and towards end of walks. Denies any significant injury recently but was a contact sport athlete in highschool. Plays pickleball several times a week -- reports he played once twice a day and \"felt it\". Patient is R handed and feels his is L leg dominant. Reports 1/10 pain sitting at start of evaluation.   Impairments A. Pain;C. Swelling;E. Decreased flexibility;M. Locking or catching   Functional Limitations perform desired leisure / sports activities   Symptom Location L knee   How/Where did it occur From Degenerative Joint Disease   Chronicity Chronic   Pain rating (0-10 point scale) Best (/10);Worst (/10)   Best (/10) 0   Worst (/10) 6   Pain quality C. Aching "   Frequency of pain/symptoms C. With activity   Pain/symptoms exacerbated by B. Walking;M. Other  (inactivity)   Pain/symptoms eased by H. Cold;C. Rest;K. Other  (exercises)   Progression of symptoms since onset: Improved  (since adding exercises)   Current / Previous Interventions   Diagnostic Tests: X-ray   X-ray Results Results   X-ray results L knee 9/6/2022: IMPRESSION: Degenerative change most severe in the medial compartment.   Prior Level of Function   Prior Level of Function-Mobility Ind   Prior Level of Function-ADLs Ind   Current Level of Function   Patient role/employment history F. Retired  ()   Fall Risk Screen   Fall screen completed by PT   Have you fallen 2 or more times in the past year? No   Have you fallen and had an injury in the past year? No   Abuse Screen (yes response referral indicated)   Feels Unsafe at Home or Work/School no   Feels Threatened by Someone no   Does Anyone Try to Keep You From Having Contact with Others or Doing Things Outside Your Home? no   Physical Signs of Abuse Present no   Patient needs abuse support services and resources No   Knee Objective Findings   Gait/Locomotion step through gait pattern with heel contact; slight increased horizontal sway with wide base of support   Foot Position In Standing slight over pronation on the L   Knee ROM Comment R knee -5-135 AROM   Knee/Hip Strength Comments L hip glut max + hamstring 3/5; L hip glut max 3/5   Palpation some tenderness along medial joint line into tibial plateau.   Observation L knee in valgus position   Integumentary  pocket of swelling below patellar tendon and superior lateral with palpable mass at IT insertion   Anterior Drawer Test -   Posterior Drawer Test -   Varus Stress Test -   Valgus Stress Test -   Raymundo's Test -   Side (if bilateral, select both right and left) Left   Left Knee Extension AROM 0   Left Knee Extension PROM -5   Left Knee Flexion AROM 122   Left Knee Flexion PROM defered  due to pain   Left Knee Flexion Strength 4/5   Left Knee Extension Strength 4+/5   Left Hip Abduction Strength 4/5   Left Hamstring Flexibility 40 degrees from neutral (20 degrees is norm)   Left Hip Flexor Flexibility tight on the L   Left Quadricep Flexibility tight on the L   Planned Therapy Interventions   Planned Therapy Interventions gait training;joint mobilization;manual therapy;ROM;strengthening;stretching   Planned Modality Interventions   Planned Modality Interventions Cryotherapy;TENS;Ultrasound;Hot packs   Planned Modality Interventions Comments vasopneumatic compression as needed   Clinical Impression   Criteria for Skilled Therapeutic Interventions Met yes, treatment indicated   PT Diagnosis L knee pain   Influenced by the following impairments weakness, pain, impaired ROM, impaired balance   Functional limitations due to impairments impaired gait, impaired long distance walking   Clinical Presentation Stable/Uncomplicated   Clinical Presentation Rationale Patient presents with s/s of arthritic knee with valgus position that is in static and dynamic position. Patient presents with fair knee strength but decreased hip strenth/ROM and stiffness in the L knee.   Clinical Decision Making (Complexity) Low complexity   Therapy Frequency 2 times/Week   Predicted Duration of Therapy Intervention (days/wks) 6 weeks   Risk & Benefits of therapy have been explained Yes   Patient, Family & other staff in agreement with plan of care Yes   Education Assessment   Preferred Learning Style Listening;Reading;Demonstration;Pictures/video   Barriers to Learning No barriers   ORTHO GOALS   PT Ortho Eval Goals 1;2;3;4   Ortho Goal 1   Goal Identifier L knee ROM   Goal Description Patient will increase L knee ROM to -5-135 to be equate R knee ROM and decrease overall stiffness   Target Date 06/01/23   Ortho Goal 2   Goal Identifier Proximal hip strength   Goal Description Patient will increase L hip strength to 5/5 in all  directions to increase stance leg stability and decrease stress of L knee   Target Date 06/15/23   Ortho Goal 3   Goal Identifier HEP   Goal Description Patient will be independent with HEP and appropriate progressions within 2 weeks   Target Date 05/18/23   Ortho Goal 4   Goal Identifier Gait/Walking distance   Goal Description Patient will report ability to ambulate >2 miles x3 week without rest breaks and pain <2/10   Target Date 06/15/23   Total Evaluation Time   PT Eval, Low Complexity Minutes (24122) 25   Therapy Certification   Certification date from 05/04/23   Certification date to 07/27/23   Medical Diagnosis IMPRESSION: Degenerative change most severe in the medial compartment.

## 2023-05-08 ENCOUNTER — HOSPITAL ENCOUNTER (OUTPATIENT)
Dept: PHYSICAL THERAPY | Facility: OTHER | Age: 68
Setting detail: THERAPIES SERIES
Discharge: HOME | End: 2023-05-08
Attending: INTERNAL MEDICINE
Payer: MEDICARE

## 2023-05-08 PROCEDURE — 97110 THERAPEUTIC EXERCISES: CPT | Mod: GP

## 2023-05-11 ENCOUNTER — HOSPITAL ENCOUNTER (OUTPATIENT)
Dept: PHYSICAL THERAPY | Facility: OTHER | Age: 68
Setting detail: THERAPIES SERIES
Discharge: HOME | End: 2023-05-11
Attending: INTERNAL MEDICINE
Payer: MEDICARE

## 2023-05-11 PROCEDURE — 97110 THERAPEUTIC EXERCISES: CPT | Mod: GP

## 2023-05-15 ENCOUNTER — HOSPITAL ENCOUNTER (OUTPATIENT)
Dept: PHYSICAL THERAPY | Facility: OTHER | Age: 68
Setting detail: THERAPIES SERIES
Discharge: HOME | End: 2023-05-15
Attending: INTERNAL MEDICINE
Payer: MEDICARE

## 2023-05-15 PROCEDURE — 97110 THERAPEUTIC EXERCISES: CPT | Mod: GP

## 2023-05-23 ENCOUNTER — THERAPY VISIT (OUTPATIENT)
Dept: PHYSICAL THERAPY | Facility: OTHER | Age: 68
End: 2023-05-23
Attending: INTERNAL MEDICINE
Payer: MEDICARE

## 2023-05-23 DIAGNOSIS — M25.562 CHRONIC PAIN OF LEFT KNEE: Primary | ICD-10-CM

## 2023-05-23 DIAGNOSIS — G89.29 CHRONIC PAIN OF LEFT KNEE: Primary | ICD-10-CM

## 2023-05-23 PROCEDURE — 97110 THERAPEUTIC EXERCISES: CPT | Mod: GP

## 2023-05-25 ENCOUNTER — THERAPY VISIT (OUTPATIENT)
Dept: PHYSICAL THERAPY | Facility: OTHER | Age: 68
End: 2023-05-25
Attending: INTERNAL MEDICINE
Payer: MEDICARE

## 2023-05-25 DIAGNOSIS — M25.562 CHRONIC PAIN OF LEFT KNEE: Primary | ICD-10-CM

## 2023-05-25 DIAGNOSIS — G89.29 CHRONIC PAIN OF LEFT KNEE: Primary | ICD-10-CM

## 2023-05-25 PROCEDURE — 97110 THERAPEUTIC EXERCISES: CPT | Mod: GP

## 2023-05-25 NOTE — PROGRESS NOTES
05/25/23 0500   Appointment Info   Signing clinician's name / credentials Soila Cordova, PT   Visits Used 6   Medical Diagnosis M25.562, G89.29 (ICD-10-CM) - Chronic pain of left knee   PT Tx Diagnosis L knee pain   Precautions/Limitations none   Quick Adds Certification   Progress Note/Certification   Start of Care Date 05/04/23   Therapy Frequency 2x weel   Predicted Duration 6 weeks   Certification date from 05/04/23   Certification date to 07/13/23   GOALS   PT Goals 2;3;4   PT Goal 1   Goal Identifier ROM   Goal Description Patient will increase L knee ROM to -5-135 to be equate R knee ROM and decrease overall stiffness   Goal Progress L knee ROM 0-125   Target Date 06/01/23   PT Goal 2   Goal Identifier Proximal hip strength   Goal Description Patient will increase L hip strength to 5/5 in all directions to increase stance leg stability and decrease stress of L knee   Target Date 06/15/23   Date Met 05/25/23   PT Goal 3   Goal Identifier HEP   Goal Description Patient will be independent with HEP and appropriate progressions within 2 weeks   Goal Progress Patient indepenent with HEP   Target Date 05/18/23   Date Met 05/23/23   PT Goal 4   Goal Identifier Gait/Walking distance   Goal Description Patient will report ability to ambulate >2 miles x3 week without rest breaks and pain <2/10   Goal Progress Patient ambulated ~1 mile last night with no increase pain   Target Date 06/15/23   Date Met 05/25/23   Subjective Report   Subjective Report Patient played pickleball on Tuesday - reports legs felt tired but no significant pain. Went for a walk last night and reports some stiffness in quads.   Objective Measures   Objective Measures Objective Measure 2;Objective Measure 1   Objective Measure 1   Objective Measure Pain   Details 1/10   Objective Measure 2   Objective Measure knee ROM left   Details 0-125   Treatment Interventions (PT)   Interventions Therapeutic Procedure/Exercise   Therapeutic  "Procedure/Exercise   Ther Proc 1 SciFit xL2.5 x6 minutes LEs only; single leg lateral step downs from 6\" step x15 reps x2 sets B; single leg step downs from 6\" step x10 reps x2 sets B single leg heel raises x15 reps B; standing calf stretch on incline board x30 seconds x2 sets; seated hamstring stretch x30 seconds x2 B; supine IT band stretch L x30 seconds x2 set B, mini squats with chair cue x10 reps x2 sets; side steps x15 feet with GTB x3 sets; bridge double leg x10 reps; single leg x10 reps B   Skilled Intervention education, strengthening, stretches   Patient Response/Progress less clicking this date with stretches first   Education   Learner/Method Patient   Education Comments see above   Plan   Homework HEP   Home program SLR, sit to stand with R foot forward, heel raises, lateral step downs, bridges, hamstring stretches   Plan for next session patient discharged this session with HEP Medicare Claim Information   Medical Diagnosis M25.562, G89.29 (ICD-10-CM) - Chronic pain of left knee   PT Diagnosis L knee pain   Start of Care Date 05/04/23   Certification date from 05/04/23         DISCHARGE  Reason for Discharge: Patient has met all goals.  No further expectation of progress.    Equipment Issued: RTB, GTB    Discharge Plan: Patient to continue home program. Patient provided with comprehensive hip/knee strengthening and stretching protocol    Referring Provider:  Alberta Fan    "

## 2023-09-03 ASSESSMENT — ENCOUNTER SYMPTOMS
DIZZINESS: 0
DYSURIA: 1
HEARTBURN: 0
NAUSEA: 0
SHORTNESS OF BREATH: 0
NERVOUS/ANXIOUS: 0
HEMATOCHEZIA: 0
CHILLS: 0
CONSTIPATION: 0
FREQUENCY: 1
ABDOMINAL PAIN: 0
PALPITATIONS: 0
HEMATURIA: 0
HEADACHES: 0
WEAKNESS: 0
COUGH: 0
EYE PAIN: 0
FEVER: 0
MYALGIAS: 0
DIARRHEA: 0
PARESTHESIAS: 0
JOINT SWELLING: 1
ARTHRALGIAS: 1
SORE THROAT: 0

## 2023-09-03 ASSESSMENT — ACTIVITIES OF DAILY LIVING (ADL): CURRENT_FUNCTION: NO ASSISTANCE NEEDED

## 2023-09-06 ENCOUNTER — OFFICE VISIT (OUTPATIENT)
Dept: INTERNAL MEDICINE | Facility: OTHER | Age: 68
End: 2023-09-06
Attending: INTERNAL MEDICINE
Payer: MEDICARE

## 2023-09-06 VITALS
DIASTOLIC BLOOD PRESSURE: 80 MMHG | OXYGEN SATURATION: 99 % | BODY MASS INDEX: 24.97 KG/M2 | RESPIRATION RATE: 16 BRPM | HEART RATE: 84 BPM | SYSTOLIC BLOOD PRESSURE: 118 MMHG | TEMPERATURE: 97.1 F | HEIGHT: 75 IN | WEIGHT: 200.8 LBS

## 2023-09-06 DIAGNOSIS — R97.20 ELEVATED PROSTATE SPECIFIC ANTIGEN (PSA): ICD-10-CM

## 2023-09-06 DIAGNOSIS — N18.31 STAGE 3A CHRONIC KIDNEY DISEASE (H): ICD-10-CM

## 2023-09-06 DIAGNOSIS — Z12.5 SCREENING PSA (PROSTATE SPECIFIC ANTIGEN): ICD-10-CM

## 2023-09-06 DIAGNOSIS — Z13.220 SCREENING FOR HYPERLIPIDEMIA: ICD-10-CM

## 2023-09-06 DIAGNOSIS — Z13.1 SCREENING FOR DIABETES MELLITUS: ICD-10-CM

## 2023-09-06 DIAGNOSIS — I10 ESSENTIAL HYPERTENSION: ICD-10-CM

## 2023-09-06 DIAGNOSIS — M17.12 PRIMARY OSTEOARTHRITIS OF LEFT KNEE: ICD-10-CM

## 2023-09-06 DIAGNOSIS — I48.0 PAROXYSMAL ATRIAL FIBRILLATION (H): ICD-10-CM

## 2023-09-06 DIAGNOSIS — Z00.00 ENCOUNTER FOR MEDICARE ANNUAL WELLNESS EXAM: Primary | ICD-10-CM

## 2023-09-06 LAB
ALBUMIN SERPL BCG-MCNC: 4.3 G/DL (ref 3.5–5.2)
ALP SERPL-CCNC: 71 U/L (ref 40–129)
ALT SERPL W P-5'-P-CCNC: 17 U/L (ref 0–70)
ANION GAP SERPL CALCULATED.3IONS-SCNC: 8 MMOL/L (ref 7–15)
AST SERPL W P-5'-P-CCNC: 25 U/L (ref 0–45)
BILIRUB SERPL-MCNC: 0.8 MG/DL
BUN SERPL-MCNC: 17.4 MG/DL (ref 8–23)
CALCIUM SERPL-MCNC: 9.4 MG/DL (ref 8.8–10.2)
CHLORIDE SERPL-SCNC: 101 MMOL/L (ref 98–107)
CHOLEST SERPL-MCNC: 155 MG/DL
CREAT SERPL-MCNC: 1.27 MG/DL (ref 0.67–1.17)
CREAT UR-MCNC: 65.3 MG/DL
DEPRECATED HCO3 PLAS-SCNC: 28 MMOL/L (ref 22–29)
ERYTHROCYTE [DISTWIDTH] IN BLOOD BY AUTOMATED COUNT: 11.9 % (ref 10–15)
GFR SERPL CREATININE-BSD FRML MDRD: 62 ML/MIN/1.73M2
GLUCOSE SERPL-MCNC: 101 MG/DL (ref 70–99)
HCT VFR BLD AUTO: 39.9 % (ref 40–53)
HDLC SERPL-MCNC: 48 MG/DL
HGB BLD-MCNC: 13.6 G/DL (ref 13.3–17.7)
HOLD SPECIMEN: NORMAL
LDLC SERPL CALC-MCNC: 94 MG/DL
MAGNESIUM SERPL-MCNC: 2 MG/DL (ref 1.7–2.3)
MCH RBC QN AUTO: 31.9 PG (ref 26.5–33)
MCHC RBC AUTO-ENTMCNC: 34.1 G/DL (ref 31.5–36.5)
MCV RBC AUTO: 94 FL (ref 78–100)
MICROALBUMIN UR-MCNC: <12 MG/L
MICROALBUMIN/CREAT UR: NORMAL MG/G{CREAT}
NONHDLC SERPL-MCNC: 107 MG/DL
PLATELET # BLD AUTO: 178 10E3/UL (ref 150–450)
POTASSIUM SERPL-SCNC: 4 MMOL/L (ref 3.4–5.3)
PROT SERPL-MCNC: 7.1 G/DL (ref 6.4–8.3)
PSA SERPL DL<=0.01 NG/ML-MCNC: 4.72 NG/ML (ref 0–4.5)
RBC # BLD AUTO: 4.26 10E6/UL (ref 4.4–5.9)
SODIUM SERPL-SCNC: 137 MMOL/L (ref 136–145)
TRIGL SERPL-MCNC: 66 MG/DL
WBC # BLD AUTO: 6.3 10E3/UL (ref 4–11)

## 2023-09-06 PROCEDURE — 36415 COLL VENOUS BLD VENIPUNCTURE: CPT | Mod: ZL | Performed by: INTERNAL MEDICINE

## 2023-09-06 PROCEDURE — 83735 ASSAY OF MAGNESIUM: CPT | Mod: ZL | Performed by: INTERNAL MEDICINE

## 2023-09-06 PROCEDURE — 93010 ELECTROCARDIOGRAM REPORT: CPT | Performed by: STUDENT IN AN ORGANIZED HEALTH CARE EDUCATION/TRAINING PROGRAM

## 2023-09-06 PROCEDURE — 82374 ASSAY BLOOD CARBON DIOXIDE: CPT | Mod: ZL | Performed by: INTERNAL MEDICINE

## 2023-09-06 PROCEDURE — G0463 HOSPITAL OUTPT CLINIC VISIT: HCPCS

## 2023-09-06 PROCEDURE — 82570 ASSAY OF URINE CREATININE: CPT | Mod: ZL | Performed by: INTERNAL MEDICINE

## 2023-09-06 PROCEDURE — 99214 OFFICE O/P EST MOD 30 MIN: CPT | Mod: 25 | Performed by: INTERNAL MEDICINE

## 2023-09-06 PROCEDURE — G0103 PSA SCREENING: HCPCS | Mod: ZL | Performed by: INTERNAL MEDICINE

## 2023-09-06 PROCEDURE — 80061 LIPID PANEL: CPT | Mod: ZL | Performed by: INTERNAL MEDICINE

## 2023-09-06 PROCEDURE — 85027 COMPLETE CBC AUTOMATED: CPT | Mod: ZL | Performed by: INTERNAL MEDICINE

## 2023-09-06 PROCEDURE — G0439 PPPS, SUBSEQ VISIT: HCPCS | Performed by: INTERNAL MEDICINE

## 2023-09-06 PROCEDURE — 93005 ELECTROCARDIOGRAM TRACING: CPT | Performed by: INTERNAL MEDICINE

## 2023-09-06 RX ORDER — LOSARTAN POTASSIUM 50 MG/1
50 TABLET ORAL DAILY
Qty: 90 TABLET | Refills: 4 | Status: SHIPPED | OUTPATIENT
Start: 2023-09-06 | End: 2024-09-09

## 2023-09-06 RX ORDER — CHLORTHALIDONE 25 MG/1
12.5 TABLET ORAL DAILY
Qty: 45 TABLET | Refills: 4 | Status: SHIPPED | OUTPATIENT
Start: 2023-09-06 | End: 2024-09-09

## 2023-09-06 ASSESSMENT — ENCOUNTER SYMPTOMS
CONSTIPATION: 0
CHILLS: 0
PALPITATIONS: 0
HEMATOCHEZIA: 0
SHORTNESS OF BREATH: 0
COUGH: 0
HEARTBURN: 0
MYALGIAS: 0
ABDOMINAL PAIN: 0
WEAKNESS: 0
NERVOUS/ANXIOUS: 0
ARTHRALGIAS: 1
HEADACHES: 0
JOINT SWELLING: 1
DIARRHEA: 0
PARESTHESIAS: 0
DIZZINESS: 0
NAUSEA: 0
DYSURIA: 1
HEMATURIA: 0
FREQUENCY: 1
FEVER: 0
SORE THROAT: 0
EYE PAIN: 0

## 2023-09-06 ASSESSMENT — ACTIVITIES OF DAILY LIVING (ADL): CURRENT_FUNCTION: NO ASSISTANCE NEEDED

## 2023-09-06 ASSESSMENT — PAIN SCALES - GENERAL: PAINLEVEL: NO PAIN (0)

## 2023-09-06 NOTE — PROGRESS NOTES
"SUBJECTIVE:   Tino is a 67 year old who presents for Preventive Visit.      9/6/2023     8:58 AM   Additional Questions   Roomed by Dory RUDOLPH       Are you in the first 12 months of your Medicare coverage?  No    Healthy Habits:     In general, how would you rate your overall health?  Good    Frequency of exercise:  4-5 days/week    Duration of exercise:  Greater than 60 minutes    Do you usually eat at least 4 servings of fruit and vegetables a day, include whole grains    & fiber and avoid regularly eating high fat or \"junk\" foods?  Yes    Taking medications regularly:  Yes    Medication side effects:  None    Ability to successfully perform activities of daily living:  No assistance needed    Home Safety:  Lack of grab bars in the bathroom    Hearing Impairment:  No hearing concerns    In the past 6 months, have you been bothered by leaking of urine?  No    In general, how would you rate your overall mental or emotional health?  Good    Additional concerns today:  No    Patient overall has been doing well.    He continues on medications for his blood pressure.  He has been on chlorthalidone and losartan.  He did have a recent low blood pressure around 100 systolic.  He has not had any side effects from his medications otherwise.    He has a history of paroxysmal atrial fibrillation.  He underwent ablation years ago.  He continues on a baby aspirin as he has not had A-fib during the last several years since this occurred.  He has however had a couple episodes recently where he thought perhaps he had recurrence although not to the degree that he had prior.  He has not been on anticoagulation since his ablation.    He continues to have issues with osteoarthritis in the left knee.  He is curious about \"life wave\" therapy.  We discussed this in depth today along with treatment of his osteoarthritis.    He is due for recheck of his kidney function.  He is also due for diabetes and cholesterol screening.  He is due for " follow-up PSA.  He sees dermatology annually.    Have you ever done Advance Care Planning? (For example, a Health Directive, POLST, or a discussion with a medical provider or your loved ones about your wishes): Yes, advance care planning is on file.       Fall risk  Fallen 2 or more times in the past year?: No  Any fall with injury in the past year?: No    Cognitive Screening   1) Repeat 3 items (Leader, Season, Table)    2) Clock draw: NORMAL  3) 3 item recall: Recalls 3 objects  Results: 3 items recalled: COGNITIVE IMPAIRMENT LESS LIKELY    Mini-CogTM Copyright S [employer]. Licensed by the author for use in [facility]; reprinted with permission (somercedes@[facility]). All rights reserved.      Do you have sleep apnea, excessive snoring or daytime drowsiness? : yes daytime drowsiness    Reviewed and updated as needed this visit by clinical staff   Tobacco  Allergies  Meds  Problems  Med Hx  Surg Hx  Fam Hx          Reviewed and updated as needed this visit by Provider   Tobacco  Allergies  Meds  Problems  Med Hx  Surg Hx  Fam Hx         Social History     Tobacco Use     Smoking status: Never     Smokeless tobacco: Never   Substance Use Topics     Alcohol use: Yes     Alcohol/week: 4.2 standard drinks of alcohol     Comment: glass of Wine, 4-5 times a week         9/3/2023     2:20 PM   Alcohol Use   Prescreen: >3 drinks/day or >7 drinks/week? No     Do you have a current opioid prescription? No  Do you use any other controlled substances or medications that are not prescribed by a provider? None    Current providers sharing in care for this patient include:   Patient Care Team:  Alberta Fan DO as PCP - General (Internal Medicine)  Alberta Fan DO as Assigned PCP  Collins Trejo MD as Assigned Musculoskeletal Provider    The following health maintenance items are reviewed in Epic and correct as of today:  Health Maintenance   Topic Date Due     COVID-19 Vaccine (5 - Moderna series)  04/19/2023     INFLUENZA VACCINE (1) 09/01/2023     LIPID  09/06/2023     MICROALBUMIN  09/06/2023     HEMOGLOBIN  10/19/2023     BMP  10/19/2023     MEDICARE ANNUAL WELLNESS VISIT  09/06/2024     FALL RISK ASSESSMENT  09/06/2024     COLORECTAL CANCER SCREENING  10/20/2027     ADVANCE CARE PLANNING  09/06/2028     DTAP/TDAP/TD IMMUNIZATION (4 - Td or Tdap) 10/16/2031     HEPATITIS C SCREENING  Completed     PHQ-2 (once per calendar year)  Completed     Pneumococcal Vaccine: 65+ Years  Completed     URINALYSIS  Completed     ZOSTER IMMUNIZATION  Completed     AORTIC ANEURYSM SCREENING (SYSTEM ASSIGNED)  Completed     IPV IMMUNIZATION  Aged Out     HPV IMMUNIZATION  Aged Out     MENINGITIS IMMUNIZATION  Aged Out       Current Outpatient Medications   Medication     aspirin 81 MG EC tablet     chlorthalidone (HYGROTON) 25 MG tablet     losartan (COZAAR) 50 MG tablet     Multiple Vitamin (MULTIVITAMIN ADULT PO)     No current facility-administered medications for this visit.       Review of Systems   Constitutional:  Negative for chills and fever.   HENT:  Negative for congestion, ear pain, hearing loss and sore throat.    Eyes:  Negative for pain and visual disturbance.   Respiratory:  Negative for cough and shortness of breath.    Cardiovascular:  Negative for chest pain, palpitations and peripheral edema.   Gastrointestinal:  Negative for abdominal pain, constipation, diarrhea, heartburn, hematochezia and nausea.   Genitourinary:  Positive for dysuria, frequency and urgency. Negative for genital sores, hematuria, impotence and penile discharge.   Musculoskeletal:  Positive for arthralgias and joint swelling. Negative for myalgias.   Skin:  Negative for rash.   Neurological:  Negative for dizziness, weakness, headaches and paresthesias.   Psychiatric/Behavioral:  Negative for mood changes. The patient is not nervous/anxious.        OBJECTIVE:   /80 (BP Location: Right arm, Patient Position: Sitting, Cuff Size:  "Adult Large)   Pulse 84   Temp 97.1  F (36.2  C) (Temporal)   Resp 16   Ht 1.892 m (6' 2.5\")   Wt 91.1 kg (200 lb 12.8 oz)   SpO2 99%   BMI 25.44 kg/m   Estimated body mass index is 25.44 kg/m  as calculated from the following:    Height as of this encounter: 1.892 m (6' 2.5\").    Weight as of this encounter: 91.1 kg (200 lb 12.8 oz).  Physical Exam  GEN: Vitals reviewed. Healthy appearing. Patient is in no acute distress. Cooperative with exam.  HEENT: Normocephalic atraumatic.  Eyes grossly normal to inspection.  No discharge or erythema, or obvious scleral/conjunctival abnormalities. Oropharynx with no erythema or exudates. Dentition adequate.  EACs clear bilaterally, TM gray with normal landmarks.  NECK: Supple; no thyromegaly or masses noted.  No cervical or supraclavicular lymphadenopathy.  CV: Heart is irregularly irregular with no obvious murmur.  LUNGS: No audible wheeze, cough, or visible cyanosis.  No visible retractions or increased work of breathing.  Lungs clear to auscultation bilaterally.    ABD: Nondistended  SKIN: Warm and dry to touch.  Visible skin clear. No significant rash, abnormal pigmentation or lesions.  EXT: No clubbing or cyanosis.  No peripheral edema.  NEURO: Alert and oriented to person, place, and time.  Cranial nerves II-XII grossly intact with no focal or lateralizing deficits.  Muscle tone normal.  Gait normal. No tremor.   MSK: ROM of upper and lower ext symmetric and full.  PSYCH: Mood is good.  Mentation appears normal, affect normal/bright, judgement and insight intact, normal speech and appearance well-groomed.      Diagnostic Test Results:  Labs reviewed in Epic    ASSESSMENT / PLAN:   1. Encounter for Medicare annual wellness exam    2. Paroxysmal atrial fibrillation (H)  - EKG shows sinus arrhythmia, not A fib which is reassuring. Continue to monitor periodically.  - EKG 12-lead, tracing only  - Magnesium    3. Stage 3a chronic kidney disease (H)  - recheck is " stable/improved  - Albumin Random Urine Quantitative with Creat Ratio  - CBC with platelets    4. Essential hypertension  - Controlled.  Continue current regimen.    - chlorthalidone (HYGROTON) 25 MG tablet; Take 0.5 tablets (12.5 mg) by mouth daily  Dispense: 45 tablet; Refill: 4  - losartan (COZAAR) 50 MG tablet; Take 1 tablet (50 mg) by mouth daily  Dispense: 90 tablet; Refill: 4    5. Screening for diabetes mellitus  - Comprehensive metabolic panel    6. Screening for hyperlipidemia  - Lipid Profile    7. Screening PSA (prostate specific antigen)  See below  - Prostate Specific Antigen Screen    8. Primary osteoarthritis of left knee  - ok to trial options for pain relief, he is to call when he is interested in surgical intervention which we discussed in depth today    9. Elevated prostate specific antigen (PSA)  PSA came back increased and slightly abnormal (previous was 2.65 in 2021, now 4.72); recommend referral to Urology for further monitoring/intervention  - Adult Urology  Referral; Future      Patient has been advised of split billing requirements and indicates understanding: Yes      COUNSELING:  Reviewed preventive health counseling, as reflected in patient instructions        He reports that he has never smoked. He has never used smokeless tobacco.      Appropriate preventive services were discussed with this patient, including applicable screening as appropriate for cardiovascular disease, diabetes, osteopenia/osteoporosis, and glaucoma.  As appropriate for age/gender, discussed screening for colorectal cancer, prostate cancer, breast cancer, and cervical cancer. Checklist reviewing preventive services available has been given to the patient.    Reviewed patients plan of care and provided an AVS. The Basic Care Plan (routine screening as documented in Health Maintenance) for Darron meets the Care Plan requirement. This Care Plan has been established and reviewed with the Patient.    Alberta MAYFIELD  DO MICHELLE Fan [facility]    Identified Health Risks:  I have reviewed Opioid Use Disorder and Substance Use Disorder risk factors and made any needed referrals.

## 2023-09-06 NOTE — PATIENT INSTRUCTIONS
Patient Education   Personalized Prevention Plan  You are due for the preventive services outlined below.  Your care team is available to assist you in scheduling these services.  If you have already completed any of these items, please share that information with your care team to update in your medical record.  Health Maintenance Due   Topic Date Due     COVID-19 Vaccine (5 - Moderna series) 04/19/2023     Flu Vaccine (1) 09/01/2023     Annual Wellness Visit  09/06/2023     Cholesterol Lab  09/06/2023     Kidney Microalbumin Urine Test  09/06/2023     Hemoglobin  10/19/2023

## 2023-09-06 NOTE — NURSING NOTE
"Chief Complaint   Patient presents with    Medicare Visit   Patient presents to the clinic today for a medicare visit    Initial There were no vitals taken for this visit. Estimated body mass index is 24.94 kg/m  as calculated from the following:    Height as of 10/19/22: 1.899 m (6' 2.75\").    Weight as of 5/3/23: 89.9 kg (198 lb 3.2 oz).  Meds Reconciled: complete        FOOD SECURITY SCREENING QUESTIONS:    The next two questions are to help us understand your food security.  If you are feeling you need any assistance in this area, we have resources available to support you today.    Hunger Vital Signs:  Within the past 12 months we worried whether our food would run out before we got money to buy more. Never  Within the past 12 months the food we bought just didn't last and we didn't have money to get more. Never  Dory Palumbo LPN,LPN on 9/6/2023 at 8:59 AM      Dory Palumbo LPN  "

## 2023-09-07 LAB
ATRIAL RATE - MUSE: 56 BPM
DIASTOLIC BLOOD PRESSURE - MUSE: NORMAL MMHG
INTERPRETATION ECG - MUSE: NORMAL
P AXIS - MUSE: 32 DEGREES
PR INTERVAL - MUSE: 134 MS
QRS DURATION - MUSE: 96 MS
QT - MUSE: 446 MS
QTC - MUSE: 430 MS
R AXIS - MUSE: 53 DEGREES
SYSTOLIC BLOOD PRESSURE - MUSE: NORMAL MMHG
T AXIS - MUSE: 26 DEGREES
VENTRICULAR RATE- MUSE: 56 BPM

## 2023-10-09 ENCOUNTER — LAB (OUTPATIENT)
Dept: LAB | Facility: OTHER | Age: 68
End: 2023-10-09
Payer: MEDICARE

## 2023-10-09 DIAGNOSIS — R97.20 ELEVATED PSA: ICD-10-CM

## 2023-10-09 PROCEDURE — 84154 ASSAY OF PSA FREE: CPT | Mod: ZL

## 2023-10-09 PROCEDURE — 84153 ASSAY OF PSA TOTAL: CPT | Mod: ZL

## 2023-10-09 PROCEDURE — 36415 COLL VENOUS BLD VENIPUNCTURE: CPT | Mod: ZL

## 2023-10-10 LAB
PSA FREE MFR SERPL: 23.12 %
PSA FREE SERPL-MCNC: 0.8 NG/ML
PSA SERPL DL<=0.01 NG/ML-MCNC: 3.46 NG/ML (ref 0–4.5)

## 2024-03-01 DIAGNOSIS — R97.20 ELEVATED PSA: Primary | ICD-10-CM

## 2024-03-11 ENCOUNTER — NURSE TRIAGE (OUTPATIENT)
Dept: INTERNAL MEDICINE | Facility: OTHER | Age: 69
End: 2024-03-11
Payer: MEDICARE

## 2024-03-11 NOTE — TELEPHONE ENCOUNTER
Scheduled with JOLENE Wallace NP tomorrow at 0900. Care advice discussed per protocol. Patient advised to call if symptoms worsen/new symptoms develop. Jaqueline Hutchins RN on 3/11/2024 at 8:41 AM      Reason for Disposition   Localized pain, redness or hard lump along vein   Patient wants to be seen    Additional Information   Negative: Followed a hip injury   Negative: Followed a knee injury   Negative: Followed an ankle or foot injury   Negative: Back pain radiating (shooting) into leg(s)   Negative: Foot pain is main symptom   Negative: Ankle pain is main symptom   Negative: Knee pain is main symptom   Negative: Leg swelling is main symptom   Negative: Looks like a broken bone or dislocated joint (e.g., crooked or deformed)   Negative: Sounds like a life-threatening emergency to the triager   Negative: Chest pain   Negative: Difficulty breathing   Negative: Entire foot is cool or blue in comparison to other side   Negative: Unable to walk   Negative: Fever and red area (or area very tender to touch)   Negative: Swollen joint and fever   Negative: Thigh or calf pain in only one leg and present > 1 hour   Negative: Thigh, calf, or ankle swelling in only one leg   Negative: Thigh, calf, or ankle swelling in both legs, but one side is definitely more swollen   Negative: History of prior 'blood clot' in leg or lungs (i.e., deep vein thrombosis, pulmonary embolism)   Negative: History of inherited increased risk of blood clots (e.g., factor 5 Leiden, antithrombin 3, protein C or protein S deficiency, prothrombin mutation)   Negative: Major surgery in past month   Negative: Hip or leg fracture (broken bone) in past month (or had cast on leg or ankle in past month)   Negative: Illness requiring prolonged bedrest in past month (e.g., immobilization, long hospital stay)   Negative: Long-distance travel in past month (e.g., car, bus, train, plane; with trip lasting 6 or more hours)   Negative: Cancer treatment in past six  "months (or has cancer now)   Negative: Patient sounds very sick or weak to the triager   Negative: SEVERE pain (e.g., excruciating, unable to do any normal activities)   Negative: Cast on leg or ankle and now has increasing pain   Negative: Red area or streak > 2 inches (or 5 cm)   Negative: Painful rash with multiple small blisters grouped together (i.e., dermatomal distribution or 'band' or 'stripe')   Negative: Looks like a boil, infected sore, deep ulcer, or other infected rash (spreading redness, pus)   Negative: Localized rash is very painful (no fever)   Negative: Numbness in a leg or foot (i.e., loss of sensation)    Answer Assessment - Initial Assessment Questions  1. ONSET: \"When did the pain start?\"       He was playing [employer] ball Sunday and thinks he pulled a calf muscle      2. LOCATION: \"Where is the pain located?\"       Right calf four inches below the knee     3. PAIN: \"How bad is the pain?\"    (Scale 1-10; or mild, moderate, severe)    -  MILD (1-3): doesn't interfere with normal activities     -  MODERATE (4-7): interferes with normal activities (e.g., work or school) or awakens from sleep, limping     -  SEVERE (8-10): excruciating pain, unable to do any normal activities, unable to walk      Tender to the touch     4. WORK OR EXERCISE: \"Has there been any recent work or exercise that involved this part of the body?\"       Yes, as above, walking exacerbates it     5. CAUSE: \"What do you think is causing the leg pain?\"      Patient thinks injury but concerned about a blood clot     6. OTHER SYMPTOMS: \"Do you have any other symptoms?\" (e.g., chest pain, back pain, breathing difficulty, swelling, rash, fever, numbness, weakness)      Warm to touch     7. PREGNANCY: \"Is there any chance you are pregnant?\" \"When was your last menstrual period?\"      no    Protocols used: Leg Pain-A-[facility]    "

## 2024-04-08 ENCOUNTER — LAB (OUTPATIENT)
Dept: LAB | Facility: OTHER | Age: 69
End: 2024-04-08
Payer: MEDICARE

## 2024-04-08 DIAGNOSIS — R97.20 ELEVATED PSA: ICD-10-CM

## 2024-04-08 PROCEDURE — 36415 COLL VENOUS BLD VENIPUNCTURE: CPT | Mod: ZL

## 2024-04-08 PROCEDURE — 84153 ASSAY OF PSA TOTAL: CPT | Mod: ZL

## 2024-04-09 LAB
PSA FREE MFR SERPL: 19.61 %
PSA FREE SERPL-MCNC: 0.7 NG/ML
PSA SERPL DL<=0.01 NG/ML-MCNC: 3.57 NG/ML (ref 0–4.5)

## 2024-05-01 ENCOUNTER — TRANSFERRED RECORDS (OUTPATIENT)
Dept: HEALTH INFORMATION MANAGEMENT | Facility: OTHER | Age: 69
End: 2024-05-01

## 2024-05-01 ENCOUNTER — OFFICE VISIT (OUTPATIENT)
Dept: ORTHOPEDICS | Facility: OTHER | Age: 69
End: 2024-05-01
Attending: ORTHOPAEDIC SURGERY
Payer: MEDICARE

## 2024-05-01 DIAGNOSIS — G89.29 CHRONIC PAIN OF LEFT KNEE: Primary | ICD-10-CM

## 2024-05-01 DIAGNOSIS — M25.562 CHRONIC PAIN OF LEFT KNEE: Primary | ICD-10-CM

## 2024-05-01 PROCEDURE — 250N000011 HC RX IP 250 OP 636: Performed by: ORTHOPAEDIC SURGERY

## 2024-05-01 PROCEDURE — 20610 DRAIN/INJ JOINT/BURSA W/O US: CPT | Mod: LT | Performed by: ORTHOPAEDIC SURGERY

## 2024-05-01 PROCEDURE — 250N000009 HC RX 250: Performed by: ORTHOPAEDIC SURGERY

## 2024-05-01 PROCEDURE — G0463 HOSPITAL OUTPT CLINIC VISIT: HCPCS

## 2024-05-01 RX ORDER — LIDOCAINE HYDROCHLORIDE 10 MG/ML
4 INJECTION, SOLUTION EPIDURAL; INFILTRATION; INTRACAUDAL; PERINEURAL ONCE
Status: COMPLETED | OUTPATIENT
Start: 2024-05-01 | End: 2024-05-01

## 2024-05-01 RX ORDER — TRIAMCINOLONE ACETONIDE 40 MG/ML
40 INJECTION, SUSPENSION INTRA-ARTICULAR; INTRAMUSCULAR ONCE
Status: COMPLETED | OUTPATIENT
Start: 2024-05-01 | End: 2024-05-01

## 2024-05-01 RX ADMIN — TRIAMCINOLONE ACETONIDE 40 MG: 40 INJECTION, SUSPENSION INTRA-ARTICULAR; INTRAMUSCULAR at 09:36

## 2024-05-01 RX ADMIN — LIDOCAINE HYDROCHLORIDE 4 ML: 10 INJECTION, SOLUTION INFILTRATION; PERINEURAL at 09:36

## 2024-05-01 NOTE — PROGRESS NOTES
SUBJECTIVE:  Patient returns in regards to left knee pain.  Patient reports occasional discomfort with regards to his knee.  Patient continues to remain active and playing pickle ball.  Patient reports discomfort mostly on the inner aspect.  Patient is unable to take anti-inflammatories secondary to kidney disease.  Patient has yet to try injections would like to give this ago here at this time.  Patient had x-rays done showing moderate approaching significant arthrosis medial compartment as well as patellofemoral joint this was done previously in 2022.    ROS: Musculoskeletal and general review of systems are negative, per review of previous clinic questionnaire.  Denies SOB and calf pain.    EXAM: Left knee examination shows varus deformity.  Tenderness across medial joint line.  Crepitation with flexion extension noted.  Neuro exam intact.    PROCEDURE NOTE: Left knee injected with 4 cc of 1% lidocaine and 40 mg of Kenalog under sterile conditions.  Patient did tolerate the procedure well.    IMAGING: None    ASSESSMENT: Left knee arthrosis    PLAN: Left knee injection administered here today.  Repeat injection in the future if necessary.  Long-term management consider moving forward joint reconstruction if symptoms do worsen.    Collins Trejo MD    Negative

## 2024-06-03 ENCOUNTER — TRANSFERRED RECORDS (OUTPATIENT)
Dept: HEALTH INFORMATION MANAGEMENT | Facility: OTHER | Age: 69
End: 2024-06-03
Payer: MEDICARE

## 2024-08-29 ENCOUNTER — OFFICE VISIT (OUTPATIENT)
Dept: FAMILY MEDICINE | Facility: OTHER | Age: 69
End: 2024-08-29
Attending: NURSE PRACTITIONER
Payer: MEDICARE

## 2024-08-29 VITALS
WEIGHT: 190.3 LBS | TEMPERATURE: 98.3 F | RESPIRATION RATE: 18 BRPM | HEART RATE: 62 BPM | HEIGHT: 75 IN | DIASTOLIC BLOOD PRESSURE: 80 MMHG | OXYGEN SATURATION: 98 % | SYSTOLIC BLOOD PRESSURE: 128 MMHG | BODY MASS INDEX: 23.66 KG/M2

## 2024-08-29 DIAGNOSIS — L08.9 INFECTED SEBACEOUS CYST: Primary | ICD-10-CM

## 2024-08-29 DIAGNOSIS — L72.3 INFECTED SEBACEOUS CYST: Primary | ICD-10-CM

## 2024-08-29 PROCEDURE — 99213 OFFICE O/P EST LOW 20 MIN: CPT | Performed by: NURSE PRACTITIONER

## 2024-08-29 PROCEDURE — 87186 SC STD MICRODIL/AGAR DIL: CPT | Mod: ZL | Performed by: NURSE PRACTITIONER

## 2024-08-29 PROCEDURE — G0463 HOSPITAL OUTPT CLINIC VISIT: HCPCS

## 2024-08-29 RX ORDER — CEPHALEXIN 500 MG/1
500 CAPSULE ORAL 2 TIMES DAILY
Qty: 14 CAPSULE | Refills: 0 | Status: SHIPPED | OUTPATIENT
Start: 2024-08-29 | End: 2024-09-05

## 2024-08-29 ASSESSMENT — PAIN SCALES - GENERAL: PAINLEVEL: NO PAIN (0)

## 2024-08-29 NOTE — NURSING NOTE
"Chief Complaint   Patient presents with    Derm Problem     Cyst on back, left shoulder blade     Patient here for a cyst on his back that has been there for about a year. He notes that it has gotten infected in the past week, with drainage and tender to touch.     Initial /80   Pulse 62   Temp 98.3  F (36.8  C) (Tympanic)   Resp 18   Ht 1.905 m (6' 3\")   Wt 86.3 kg (190 lb 4.8 oz)   SpO2 98%   BMI 23.79 kg/m   Estimated body mass index is 23.79 kg/m  as calculated from the following:    Height as of this encounter: 1.905 m (6' 3\").    Weight as of this encounter: 86.3 kg (190 lb 4.8 oz).  Medication Review: complete    The next two questions are to help us understand your food security.  If you are feeling you need any assistance in this area, we have resources available to support you today.          8/29/2024   SDOH- Food Insecurity   Within the past 12 months, did you worry that your food would run out before you got money to buy more? N   Within the past 12 months, did the food you bought just not last and you didn t have money to get more? N            Health Care Directive:  Patient has a Health Care Directive on file      Sherly Basilio LPN      "

## 2024-08-29 NOTE — PROGRESS NOTES
ASSESSMENT/PLAN:     I have reviewed the nursing notes.  I have reviewed the findings, diagnosis, plan and need for follow up with the patient.      1. Infected sebaceous cyst  - Cyst Aerobic Bacterial Culture Routine Without Gram Stain  - cephALEXin (KEFLEX) 500 MG capsule; Take 1 capsule (500 mg) by mouth 2 times daily for 7 days.  Dispense: 14 capsule; Refill: 0  - Adult Gen Surg  Referral; Future    Infected sebaceous cyst present on left scapular area with spontaneous opening therefore manually drained.  Wound culture obtained and pending.  Will start oral antibiotics due to localized erythema and inflammation of tissue.  May use over-the-counter Tylenol or ibuprofen PRN.  Recommend use of hot showers and hot compresses.  Referral to general surgery for removal of cystic sac in approximately 2 weeks.        I explained my diagnostic considerations and recommendations to the patient, who voiced understanding and agreement with the treatment plan. All questions were answered. We discussed potential side effects of any prescribed or recommended therapies, as well as expectations for response to treatments.    Cris Corral NP  [facility]      SUBJECTIVE:   Darron Tatum is a 68 year old male who presents to clinic today for the following health issues:  Cyst    HPI  Patient with persisting cyst on his back near the left shoulder blade present for approximately a year.  He notes acute onset of concerns for infection over the past week with tenderness and swelling.   Now with drainage now the past 2 nights.    No previous hx of cysts.  No fevers.  Using hot showers at home and covering with bandage.          Past Medical History:   Diagnosis Date    Atrial fibrillation (H) 10/31/2014    Calculus of kidney     passed spontaneous, age of 30    Enlarged prostate without lower urinary tract symptoms (luts) 09/26/2013    HTN (hypertension) 11/13/2019    Injury of hand, left, initial  "encounter 10/2021    fracture and laceration - splitting wood    Mixed hyperlipidemia 03/18/2019    Other specified glaucoma     Squamous cell carcinoma of skin of face      Past Surgical History:   Procedure Laterality Date    CARDIOVERSION  02/2017    COLONOSCOPY  2017    COLONOSCOPY  10/20/2022    Joaquin Cortes MD, [facility]    EP ABLATION ATRIAL FLUTTER  01/28/2020    FINGER SURGERY Left 2021    repair, left long finger injury    HERNIA REPAIR Right 1998    REPAIR RETINACULAR OPEN MEDIAL OR LATERAL      Retinal hole     Social History     Tobacco Use    Smoking status: Never    Smokeless tobacco: Never   Substance Use Topics    Alcohol use: Yes     Alcohol/week: 4.2 standard drinks of alcohol     Comment: glass of Wine, 4-5 times a week     Current Outpatient Medications   Medication Sig Dispense Refill    aspirin 81 MG EC tablet Take 81 mg by mouth daily      chlorthalidone (HYGROTON) 25 MG tablet Take 0.5 tablets (12.5 mg) by mouth daily 45 tablet 4    losartan (COZAAR) 50 MG tablet Take 1 tablet (50 mg) by mouth daily 90 tablet 4    Multiple Vitamin (MULTIVITAMIN ADULT PO)        Allergies   Allergen Reactions    Brimonidine Tartrate Other (See Comments)     Other reaction(s): Conjunctivitis         Past medical history, past surgical history, current medications and allergies reviewed and accurate to the best of my knowledge.        OBJECTIVE:     /80   Pulse 62   Temp 98.3  F (36.8  C) (Tympanic)   Resp 18   Ht 1.905 m (6' 3\")   Wt 86.3 kg (190 lb 4.8 oz)   SpO2 98%   BMI 23.79 kg/m    Body mass index is 23.79 kg/m .        Physical Exam  General Appearance: Well appearing adult male, appropriate appearance for age. No acute distress  Respiratory: normal chest wall and respirations.  Normal effort.  No cough appreciated.   Musculoskeletal:  Equal movement of bilateral upper extremities.  Equal movement of bilateral lower extremities.  Normal gait.    Dermatological: left scapular " area with approximately 1.5 to 2 cm domed pustular cyst with mild surrounding erythema; active thick purulent drainage with white debris expressed.  Culture obtained.    Psychological: normal affect, alert, oriented, and pleasant.       Labs:  Wound culture pending

## 2024-09-02 LAB — BACTERIA FLD CULT: ABNORMAL

## 2024-09-09 RX ORDER — PREDNISOLONE ACETATE 10 MG/ML
SUSPENSION/ DROPS OPHTHALMIC
COMMUNITY
Start: 2024-06-05 | End: 2024-09-11

## 2024-09-09 RX ORDER — DORZOLAMIDE HYDROCHLORIDE AND TIMOLOL MALEATE 20; 5 MG/ML; MG/ML
SOLUTION/ DROPS OPHTHALMIC
COMMUNITY
Start: 2024-06-04 | End: 2024-09-11 | Stop reason: ALTCHOICE

## 2024-09-09 RX ORDER — POLYMYXIN B SULFATE AND TRIMETHOPRIM 1; 10000 MG/ML; [USP'U]/ML
SOLUTION OPHTHALMIC
COMMUNITY
Start: 2024-05-02 | End: 2024-09-11

## 2024-09-09 RX ORDER — LATANOPROST 50 UG/ML
SOLUTION/ DROPS OPHTHALMIC
COMMUNITY
Start: 2022-09-18

## 2024-09-10 ASSESSMENT — PATIENT HEALTH QUESTIONNAIRE - PHQ9
SUM OF ALL RESPONSES TO PHQ QUESTIONS 1-9: 3
SUM OF ALL RESPONSES TO PHQ QUESTIONS 1-9: 3
10. IF YOU CHECKED OFF ANY PROBLEMS, HOW DIFFICULT HAVE THESE PROBLEMS MADE IT FOR YOU TO DO YOUR WORK, TAKE CARE OF THINGS AT HOME, OR GET ALONG WITH OTHER PEOPLE: NOT DIFFICULT AT ALL

## 2024-09-11 ENCOUNTER — TELEPHONE (OUTPATIENT)
Dept: INTERNAL MEDICINE | Facility: OTHER | Age: 69
End: 2024-09-11

## 2024-09-11 ENCOUNTER — OFFICE VISIT (OUTPATIENT)
Dept: INTERNAL MEDICINE | Facility: OTHER | Age: 69
End: 2024-09-11
Attending: INTERNAL MEDICINE
Payer: MEDICARE

## 2024-09-11 ENCOUNTER — OFFICE VISIT (OUTPATIENT)
Dept: ORTHOPEDICS | Facility: OTHER | Age: 69
End: 2024-09-11
Attending: ORTHOPAEDIC SURGERY
Payer: MEDICARE

## 2024-09-11 VITALS
SYSTOLIC BLOOD PRESSURE: 126 MMHG | WEIGHT: 190.8 LBS | HEART RATE: 62 BPM | RESPIRATION RATE: 14 BRPM | OXYGEN SATURATION: 98 % | HEIGHT: 75 IN | DIASTOLIC BLOOD PRESSURE: 76 MMHG | BODY MASS INDEX: 23.72 KG/M2 | TEMPERATURE: 96.6 F

## 2024-09-11 DIAGNOSIS — L72.3 SEBACEOUS CYST: ICD-10-CM

## 2024-09-11 DIAGNOSIS — D69.6 THROMBOCYTOPENIA (H): ICD-10-CM

## 2024-09-11 DIAGNOSIS — Z23 ENCOUNTER FOR IMMUNIZATION: ICD-10-CM

## 2024-09-11 DIAGNOSIS — E78.2 MIXED HYPERLIPIDEMIA: ICD-10-CM

## 2024-09-11 DIAGNOSIS — G89.29 CHRONIC PAIN OF LEFT KNEE: Primary | ICD-10-CM

## 2024-09-11 DIAGNOSIS — R13.10 DYSPHAGIA, UNSPECIFIED TYPE: ICD-10-CM

## 2024-09-11 DIAGNOSIS — Z12.5 SCREENING FOR PROSTATE CANCER: ICD-10-CM

## 2024-09-11 DIAGNOSIS — Z71.85 VACCINE COUNSELING: ICD-10-CM

## 2024-09-11 DIAGNOSIS — I48.0 PAROXYSMAL ATRIAL FIBRILLATION (H): ICD-10-CM

## 2024-09-11 DIAGNOSIS — Z00.00 ENCOUNTER FOR MEDICARE ANNUAL WELLNESS EXAM: Primary | ICD-10-CM

## 2024-09-11 DIAGNOSIS — M25.562 CHRONIC PAIN OF LEFT KNEE: Primary | ICD-10-CM

## 2024-09-11 DIAGNOSIS — N18.31 STAGE 3A CHRONIC KIDNEY DISEASE (H): ICD-10-CM

## 2024-09-11 DIAGNOSIS — I10 ESSENTIAL HYPERTENSION: ICD-10-CM

## 2024-09-11 LAB
ALBUMIN SERPL BCG-MCNC: 4.4 G/DL (ref 3.5–5.2)
ALP SERPL-CCNC: 64 U/L (ref 40–150)
ALT SERPL W P-5'-P-CCNC: 20 U/L (ref 0–70)
ANION GAP SERPL CALCULATED.3IONS-SCNC: 8 MMOL/L (ref 7–15)
AST SERPL W P-5'-P-CCNC: 30 U/L (ref 0–45)
BILIRUB SERPL-MCNC: 0.7 MG/DL
BUN SERPL-MCNC: 16.3 MG/DL (ref 8–23)
CALCIUM SERPL-MCNC: 9.6 MG/DL (ref 8.8–10.4)
CHLORIDE SERPL-SCNC: 98 MMOL/L (ref 98–107)
CHOLEST SERPL-MCNC: 168 MG/DL
CREAT SERPL-MCNC: 1.28 MG/DL (ref 0.67–1.17)
CREAT UR-MCNC: 65.7 MG/DL
EGFRCR SERPLBLD CKD-EPI 2021: 61 ML/MIN/1.73M2
ERYTHROCYTE [DISTWIDTH] IN BLOOD BY AUTOMATED COUNT: 11.6 % (ref 10–15)
FASTING STATUS PATIENT QL REPORTED: NO
FASTING STATUS PATIENT QL REPORTED: NO
GLUCOSE SERPL-MCNC: 97 MG/DL (ref 70–99)
HCO3 SERPL-SCNC: 29 MMOL/L (ref 22–29)
HCT VFR BLD AUTO: 39.7 % (ref 40–53)
HDLC SERPL-MCNC: 55 MG/DL
HGB BLD-MCNC: 13.6 G/DL (ref 13.3–17.7)
LDLC SERPL CALC-MCNC: 101 MG/DL
MAGNESIUM SERPL-MCNC: 2.1 MG/DL (ref 1.7–2.3)
MCH RBC QN AUTO: 31.8 PG (ref 26.5–33)
MCHC RBC AUTO-ENTMCNC: 34.3 G/DL (ref 31.5–36.5)
MCV RBC AUTO: 93 FL (ref 78–100)
MICROALBUMIN UR-MCNC: <12 MG/L
MICROALBUMIN/CREAT UR: NORMAL MG/G{CREAT}
NONHDLC SERPL-MCNC: 113 MG/DL
PLATELET # BLD AUTO: 194 10E3/UL (ref 150–450)
POTASSIUM SERPL-SCNC: 4.1 MMOL/L (ref 3.4–5.3)
PROT SERPL-MCNC: 7.3 G/DL (ref 6.4–8.3)
PSA SERPL DL<=0.01 NG/ML-MCNC: 3.74 NG/ML (ref 0–4.5)
RBC # BLD AUTO: 4.28 10E6/UL (ref 4.4–5.9)
SODIUM SERPL-SCNC: 135 MMOL/L (ref 135–145)
TRIGL SERPL-MCNC: 59 MG/DL
WBC # BLD AUTO: 7.3 10E3/UL (ref 4–11)

## 2024-09-11 PROCEDURE — 36415 COLL VENOUS BLD VENIPUNCTURE: CPT | Mod: ZL | Performed by: INTERNAL MEDICINE

## 2024-09-11 PROCEDURE — 83735 ASSAY OF MAGNESIUM: CPT | Mod: ZL | Performed by: INTERNAL MEDICINE

## 2024-09-11 PROCEDURE — 20610 DRAIN/INJ JOINT/BURSA W/O US: CPT | Mod: LT

## 2024-09-11 PROCEDURE — 250N000011 HC RX IP 250 OP 636: Mod: JZ | Performed by: ORTHOPAEDIC SURGERY

## 2024-09-11 PROCEDURE — G0463 HOSPITAL OUTPT CLINIC VISIT: HCPCS | Mod: 25,27 | Performed by: INTERNAL MEDICINE

## 2024-09-11 PROCEDURE — 250N000009 HC RX 250: Performed by: ORTHOPAEDIC SURGERY

## 2024-09-11 PROCEDURE — 90662 IIV NO PRSV INCREASED AG IM: CPT

## 2024-09-11 PROCEDURE — G0103 PSA SCREENING: HCPCS | Mod: ZL | Performed by: INTERNAL MEDICINE

## 2024-09-11 PROCEDURE — 85027 COMPLETE CBC AUTOMATED: CPT | Mod: ZL | Performed by: INTERNAL MEDICINE

## 2024-09-11 PROCEDURE — G0439 PPPS, SUBSEQ VISIT: HCPCS | Performed by: INTERNAL MEDICINE

## 2024-09-11 PROCEDURE — 20610 DRAIN/INJ JOINT/BURSA W/O US: CPT | Mod: LT | Performed by: ORTHOPAEDIC SURGERY

## 2024-09-11 PROCEDURE — 82043 UR ALBUMIN QUANTITATIVE: CPT | Mod: ZL | Performed by: INTERNAL MEDICINE

## 2024-09-11 PROCEDURE — 80061 LIPID PANEL: CPT | Mod: ZL | Performed by: INTERNAL MEDICINE

## 2024-09-11 PROCEDURE — G0463 HOSPITAL OUTPT CLINIC VISIT: HCPCS | Mod: 25

## 2024-09-11 PROCEDURE — 99214 OFFICE O/P EST MOD 30 MIN: CPT | Mod: 25 | Performed by: INTERNAL MEDICINE

## 2024-09-11 PROCEDURE — 80053 COMPREHEN METABOLIC PANEL: CPT | Mod: ZL | Performed by: INTERNAL MEDICINE

## 2024-09-11 RX ORDER — LIDOCAINE HYDROCHLORIDE 10 MG/ML
4 INJECTION, SOLUTION EPIDURAL; INFILTRATION; INTRACAUDAL; PERINEURAL ONCE
Status: COMPLETED | OUTPATIENT
Start: 2024-09-11 | End: 2024-09-11

## 2024-09-11 RX ORDER — LOSARTAN POTASSIUM 50 MG/1
50 TABLET ORAL DAILY
Qty: 90 TABLET | Refills: 4 | Status: SHIPPED | OUTPATIENT
Start: 2024-09-11

## 2024-09-11 RX ORDER — CHLORTHALIDONE 25 MG/1
12.5 TABLET ORAL
Qty: 45 TABLET | Refills: 4 | Status: SHIPPED | OUTPATIENT
Start: 2024-09-12

## 2024-09-11 RX ORDER — TRIAMCINOLONE ACETONIDE 40 MG/ML
40 INJECTION, SUSPENSION INTRA-ARTICULAR; INTRAMUSCULAR ONCE
Status: COMPLETED | OUTPATIENT
Start: 2024-09-11 | End: 2024-09-11

## 2024-09-11 RX ADMIN — LIDOCAINE HYDROCHLORIDE 4 ML: 10 INJECTION, SOLUTION INFILTRATION; PERINEURAL at 11:39

## 2024-09-11 RX ADMIN — TRIAMCINOLONE ACETONIDE 40 MG: 40 INJECTION, SUSPENSION INTRA-ARTICULAR; INTRAMUSCULAR at 11:40

## 2024-09-11 SDOH — HEALTH STABILITY: PHYSICAL HEALTH: ON AVERAGE, HOW MANY MINUTES DO YOU ENGAGE IN EXERCISE AT THIS LEVEL?: 120 MIN

## 2024-09-11 NOTE — PROGRESS NOTES
Assessment & Plan     Stage 3a chronic kidney disease (H)  Kidney function overall is stable from prior.  Continue to monitor every 6 to 12 months.  - Albumin Random Urine Quantitative with Creat Ratio  - Magnesium    Vaccine counseling  Flu shot given today.  Encouraged to get RSV this fall.  - INFLUENZA HIGH DOSE, TRIVALENT, PF (FLUZONE)  - RSV vaccine, bivalent, ABRYSVO, injection; Inject 0.5 mLs into the muscle once for 1 dose. Pharmacist administered    Mixed hyperlipidemia  -Cholesterol overall is controlled.  Continue to monitor annually.  - Lipid Profile  - CBC with platelets  - Comprehensive metabolic panel    Essential hypertension  Blood pressure is on the low normal side.  We discussed options and at this time we will reduce his chlorthalidone to 4 times weekly.  If he has continued issues with hypotension it is recommended that we reduce his losartan to 25 mg daily.  Continue to monitor.  - chlorthalidone (HYGROTON) 25 MG tablet; Take 0.5 tablets (12.5 mg) by mouth four times a week.  - losartan (COZAAR) 50 MG tablet; Take 1 tablet (50 mg) by mouth daily.    Screening for prostate cancer  Recheck PSA is essentially unchanged from earlier this year.  Continue to monitor  - PSA Screen GH    Encounter for immunization  - INFLUENZA HIGH DOSE, TRIVALENT, PF (FLUZONE)    Paroxysmal atrial fibrillation (H)  It was discussed with patient that his  - Echocardiogram Complete; Future    Thrombocytopenia (H24)  Resolved    Sebaceous cyst  Discussed the pros and cons of surgical removal.  At this time his cyst has essentially resolved  And he can wait on any further evaluation or treatment or go forward with definitive treatment.  He was encouraged to use hot packs and contact the clinic should he have any recurrence.    Dysphagia, unspecified type  Etiology of his dysphagia is unknown.  He has not had any additional issues in the last month or so.  He was strongly encouraged to drink plenty of fluids before,  during and after eating.  If he has continued issues would recommend further evaluation with swallow study versus EGD.    Patient has been advised of split billing requirements and indicates understanding: Yes        Counseling  Appropriate preventive services were addressed with this patient via screening, questionnaire, or discussion as appropriate for fall prevention, nutrition, physical activity, Tobacco-use cessation, social engagement, weight loss and cognition.  Checklist reviewing preventive services available has been given to the patient.  Reviewed patient's diet, addressing concerns and/or questions.   He is at risk for lack of exercise and has been provided with information to increase physical activity for the benefit of his well-being.   The patient was instructed to see the dentist every 6 months.   Patient reported safety concerns were addressed today.The patient was provided with written information regarding signs of hearing loss.       Return in about 1 year (around 9/11/2025) for Annual Review with renewal of all medications, and as needed sooner.    Anton Jiménez is a 68 year old, presenting for the following health issues:  Medicare Visit      9/11/2024     8:46 AM   Additional Questions   Roomed by VICTOR MANUEL Felix   Accompanied by SIMON RECIO     Presents today for follow-up of medications and annual visit.  He reports that he has had low normal blood pressures.  He has been getting down to 100 systolic.  At times he feels he is going to blackout with this.  He did stop his chlorthalidone completely and noted that his blood pressure increased significantly.  Since then he has gone back on it and his blood pressure most recently has been in the 120/70 range.  For his blood pressure he is currently on losartan 50 mg daily along with chlorthalidone 12.5 mg daily.    He is scheduled for a removal of a sebaceous cyst on his back tomorrow.  He is curious if this needs to be done.    He has been having  "some increase in acid reflux and dysphagia.  He has never really had issues with indigestion in the past.  He has been trying to have an intentional reduction in p.o. intake.  His weight has decreased with this.    Has a history of atrial fibrillation.  He denies any recent chest pain, dyspnea on exertion or palpitations.  He has been noting some fatigue with exercise.  This is particularly when he plays pickle ball.    He is due for labs today including a recheck of his PSA.  He would like his influenza vaccine.    ROS:  CONSTITUTIONAL: Negative for fever, chills, night sweats, significant change in weight although some loss with decrease in intake  INTEGUMENTARY/SKIN/LYMPH: Negative for worrisome rashes, moles or lesions; swollen lymph nodes  EYES: Negative for significant vision changes or irritation  ENT: Negative for additional ear, mouth and throat problems  RESP: Negative for significant cough or SOB  CV: Negative for chest pain, palpitations or increased peripheral edema  GI: Negative for abdominal pain, or change in bowel habits or blood in the stools/black stools  : Negative for unusual urinary symptoms  MUSCULOSKELETAL: Negative for new or changing joint pain or significant swelling but chronic knee pain  NEURO: Negative for new headaches, weakness or paraesthesias  PSYCHIATRIC: Negative for changes in mood or affect; significant anxiety or depression        Objective    /76   Pulse 62   Temp (!) 96.6  F (35.9  C) (Temporal)   Resp 14   Ht 1.892 m (6' 2.5\")   Wt 86.5 kg (190 lb 12.8 oz)   SpO2 98%   BMI 24.17 kg/m    Body mass index is 24.17 kg/m .  Physical Exam   GEN: Vitals reviewed. Healthy appearing. Patient is in no acute distress. Cooperative with exam.  HEENT: Normocephalic atraumatic.  Eyes grossly normal to inspection.  No discharge or erythema, or obvious scleral/conjunctival abnormalities. Oropharynx with no erythema or exudates. Dentition adequate.    NECK: Supple; no " thyromegaly or masses noted.  No cervical or supraclavicular lymphadenopathy.  CV: Heart regular in rate and rhythm with no murmur.    LUNGS: No audible wheeze, cough, or visible cyanosis.  No visible retractions or increased work of breathing.  Lungs clear to auscultation bilaterally.    ABD:  Nondistended  SKIN: Warm and dry to touch.  Visible skin clear. No significant rash, abnormal pigmentation or lesions.  EXT: No clubbing or cyanosis.  No peripheral edema.  NEURO: Alert and oriented to person, place, and time.  Cranial nerves II-XII grossly intact with no focal or lateralizing deficits.  Muscle tone normal.  Gait normal. No tremor.   MSK: ROM of upper and lower ext symmetric and full.  PSYCH: Mood is good.  Mentation appears normal, affect normal/bright, judgement and insight intact, normal speech and appearance well-groomed.          Signed Electronically by: Alberta Fan, DO

## 2024-09-11 NOTE — PATIENT INSTRUCTIONS
Patient Education   Preventive Care Advice   This is general advice given by our system to help you stay healthy. However, your care team may have specific advice just for you. Please talk to your care team about your preventive care needs.  Nutrition  Eat 5 or more servings of fruits and vegetables each day.  Try wheat bread, brown rice and whole grain pasta (instead of white bread, rice, and pasta).  Get enough calcium and vitamin D. Check the label on foods and aim for 100% of the RDA (recommended daily allowance).  Lifestyle  Exercise at least 150 minutes each week  (30 minutes a day, 5 days a week).  Do muscle strengthening activities 2 days a week. These help control your weight and prevent disease.  No smoking.  Wear sunscreen to prevent skin cancer.  Have a dental exam and cleaning every 6 months.  Yearly exams  See your health care team every year to talk about:  Any changes in your health.  Any medicines your care team has prescribed.  Preventive care, family planning, and ways to prevent chronic diseases.  Shots (vaccines)   HPV shots (up to age 26), if you've never had them before.  Hepatitis B shots (up to age 59), if you've never had them before.  COVID-19 shot: Get this shot when it's due.  Flu shot: Get a flu shot every year.  Tetanus shot: Get a tetanus shot every 10 years.  Pneumococcal, hepatitis A, and RSV shots: Ask your care team if you need these based on your risk.  Shingles shot (for age 50 and up)  General health tests  Diabetes screening:  Starting at age 35, Get screened for diabetes at least every 3 years.  If you are younger than age 35, ask your care team if you should be screened for diabetes.  Cholesterol test: At age 39, start having a cholesterol test every 5 years, or more often if advised.  Bone density scan (DEXA): At age 50, ask your care team if you should have this scan for osteoporosis (brittle bones).  Hepatitis C: Get tested at least once in your life.  STIs (sexually  transmitted infections)  Before age 24: Ask your care team if you should be screened for STIs.  After age 24: Get screened for STIs if you're at risk. You are at risk for STIs (including HIV) if:  You are sexually active with more than one person.  You don't use condoms every time.  You or a partner was diagnosed with a sexually transmitted infection.  If you are at risk for HIV, ask about PrEP medicine to prevent HIV.  Get tested for HIV at least once in your life, whether you are at risk for HIV or not.  Cancer screening tests  Cervical cancer screening: If you have a cervix, begin getting regular cervical cancer screening tests starting at age 21.  Breast cancer scan (mammogram): If you've ever had breasts, begin having regular mammograms starting at age 40. This is a scan to check for breast cancer.  Colon cancer screening: It is important to start screening for colon cancer at age 45.  Have a colonoscopy test every 10 years (or more often if you're at risk) Or, ask your provider about stool tests like a FIT test every year or Cologuard test every 3 years.  To learn more about your testing options, visit:   .  For help making a decision, visit:   https://bit.ly/ug36104.  Prostate cancer screening test: If you have a prostate, ask your care team if a prostate cancer screening test (PSA) at age 55 is right for you.  Lung cancer screening: If you are a current or former smoker ages 50 to 80, ask your care team if ongoing lung cancer screenings are right for you.  For informational purposes only. Not to replace the advice of your health care provider. Copyright   2023 [facility]. All rights reserved. Clinically reviewed by the [facility]. [employer] 687823 - REV 01/24.  Learning About Activities of Daily Living  What are activities of daily living?     Activities of daily living (ADLs) are the basic self-care tasks you do every day. These include eating, bathing, dressing,  and moving around.  As you age, and if you have health problems, you may find that it's harder to do some of these tasks. If so, your doctor can suggest ideas that may help.  To measure what kind of help you may need, your doctor will ask how well you are able to do ADLs. Let your doctor know if there are any tasks that you are having trouble doing. This is an important first step to getting help. And when you have the help you need, you can stay as independent as possible.  How will a doctor assess your ADLs?  Asking about ADLs is part of a routine health checkup your doctor will likely do as you age. Your health check might be done in a doctor's office, in your home, or at a hospital. The goal is to find out if you are having any problems that could make it hard to care for yourself or that make it unsafe for you to be on your own.  To measure your ADLs, your doctor will ask how hard it is for you to do routine tasks. Your doctor may also want to know if you have changed the way you do a task because of a health problem. Your doctor may watch how you:  Walk back and forth.  Keep your balance while you stand or walk.  Move from sitting to standing or from a bed to a chair.  Button or unbutton a shirt or sweater.  Remove and put on your shoes.  It's common to feel a little worried or anxious if you find you can't do all the things you used to be able to do. Talking with your doctor about ADLs is a way to make sure you're as safe as possible and able to care for yourself as well as you can. You may want to bring a caregiver, friend, or family member to your checkup. They can help you talk to your doctor.  Follow-up care is a key part of your treatment and safety. Be sure to make and go to all appointments, and call your doctor if you are having problems. It's also a good idea to know your test results and keep a list of the medicines you take.  Current as of: October 24, 2023  Content Version: 14.1    3700-0401  Healthwise, [employer].   Care instructions adapted under license by your healthcare professional. If you have questions about a medical condition or this instruction, always ask your healthcare professional. [employer] disclaims any warranty or liability for your use of this information.    Hearing Loss: Care Instructions  Overview     Hearing loss is a sudden or slow decrease in how well you hear. It can range from slight to profound. Permanent hearing loss can occur with aging. It also can happen when you are exposed long-term to loud noise. Examples include listening to loud music, riding motorcycles, or being around other loud machines.  Hearing loss can affect your work and home life. It can make you feel lonely or depressed. You may feel that you have lost your independence. But hearing aids and other devices can help you hear better and feel connected to others.  Follow-up care is a key part of your treatment and safety. Be sure to make and go to all appointments, and call your doctor if you are having problems. It's also a good idea to know your test results and keep a list of the medicines you take.  How can you care for yourself at home?  Avoid loud noises whenever possible. This helps keep your hearing from getting worse.  Always wear hearing protection around loud noises.  Wear a hearing aid as directed.  A professional can help you pick a hearing aid that will work best for you.  You can also get hearing aids over the counter for mild to moderate hearing loss.  Have hearing tests as your doctor suggests. They can show whether your hearing has changed. Your hearing aid may need to be adjusted.  Use other devices as needed. These may include:  Telephone amplifiers and hearing aids that can connect to a television, stereo, radio, or microphone.  Devices that use lights or vibrations. These alert you to the doorbell, a ringing telephone, or a baby monitor.  Television closed-captioning.  "This shows the words at the bottom of the screen. Most new TVs can do this.  TTY (text telephone). This lets you type messages back and forth on the telephone instead of talking or listening. These devices are also called TDD. When messages are typed on the keyboard, they are sent over the phone line to a receiving TTY. The message is shown on a monitor.  Use text messaging, social media, and email if it is hard for you to communicate by telephone.  Try to learn a listening technique called speechreading. It is not lipreading. You pay attention to people's gestures, expressions, posture, and tone of voice. These clues can help you understand what a person is saying. Face the person you are talking to, and have them face you. Make sure the lighting is good. You need to see the other person's face clearly.  Think about counseling if you need help to adjust to your hearing loss.  When should you call for help?  Watch closely for changes in your health, and be sure to contact your doctor if:    You think your hearing is getting worse.     You have new symptoms, such as dizziness or nausea.   Where can you learn more?  Go to https://www.[employer].net/patiented  Enter R798 in the search box to learn more about \"Hearing Loss: Care Instructions.\"  Current as of: September 27, 2023               Content Version: 14.0    4147-2389 [employer].   Care instructions adapted under license by your healthcare professional. If you have questions about a medical condition or this instruction, always ask your healthcare professional. Healthwise, [employer] disclaims any warranty or liability for your use of this information.         "

## 2024-09-11 NOTE — PROGRESS NOTES
SUBJECTIVE:  Patient returns in regards to left knee pain.  Patient is here for repeat injection.  Last injection was done in May with good success.  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  Patient reports a family history of that.      ROS: Musculoskeletal and general review of systems are negative, per review of previous clinic questionnaire.  Denies SOB and calf pain.    EXAM:  Left knee examination shows tenderness across medial joint line.  Crepitation with flexion extension.  Neuroexam intact.  Varus deformity present.    PROCEDURE NOTE: Left knee is injected with 4 cc 1% lidocaine and 40 mg of Kenalog under sterile conditions.    IMAGING: None    ASSESSMENT: Left knee arthrosis moderate approaching severe    PLAN: Left knee injection here today.  Repeat injection in the future as necessary.  Long-term management joint reconstruction.    Collins Trejo MD

## 2024-09-11 NOTE — PROGRESS NOTES
Preventive Care Visit  [facility]  Alberta Fan DO, Internal Medicine  Sep 11, 2024      Assessment & Plan       Encounter for Medicare annual wellness exam    -Colon cancer screening done via colonoscopy on 10/20/22 (impression: polyp, diverticulitis. Follow-up 5 years. Due 10/20/27.    -PSA lab work performed 4/8/24 (value of 3.57 micrograms/L).  Pt would like PSA testing today.     -Immunizations: Patient is due for the following: RSV and Influenza. Will give influenza in clinic today.     -Derm: Does patient regularly see dermatologist? Yes, appt 10/3/24.  -Refills pended for requested medications.  -Labs pended.     Counseling  Appropriate preventive services were addressed with this patient via screening, questionnaire, or discussion as appropriate for fall prevention, nutrition, physical activity, Tobacco-use cessation, social engagement, weight loss and cognition.  Checklist reviewing preventive services available has been given to the patient.  Reviewed patient's diet, addressing concerns and/or questions.   He is at risk for lack of exercise and has been provided with information to increase physical activity for the benefit of his well-being.   The patient was instructed to see the dentist every 6 months.   Patient reported safety concerns were addressed today.The patient was provided with written information regarding signs of hearing loss.     No follow-ups on file.    Anton Jiménez is a 68 year old, presenting for the following:  Medicare Visit        9/11/2024     8:46 AM   Additional Questions   Roomed by VICTOR MANUEL Felix   Accompanied by SIMON         Health Care Directive  Patient has a Health Care Directive on file  Advance care planning document is on file and is current.          9/11/2024   General Health   How would you rate your overall physical health? Good   Feel stress (tense, anxious, or unable to sleep) Not at all            9/11/2024   Nutrition   Diet: Regular (no  restrictions)            9/11/2024   Exercise   Days per week of moderate/strenous exercise 3 days   Average minutes spent exercising at this level 120 min            9/11/2024   Social Factors   Frequency of gathering with friends or relatives More than three times a week   Worry food won't last until get money to buy more No   Food not last or not have enough money for food? No   Do you have housing? (Housing is defined as stable permanent housing and does not include staying ouside in a car, in a tent, in an abandoned building, in an overnight shelter, or couch-surfing.) Yes   Are you worried about losing your housing? No   Lack of transportation? No   Unable to get utilities (heat,electricity)? No            9/11/2024   Fall Risk   Fallen 2 or more times in the past year? No    No   Trouble with walking or balance? No    No       Multiple values from one day are sorted in reverse-chronological order          9/11/2024   Activities of Daily Living- Home Safety   Needs help with the following daily activites None of the above   Safety concerns in the home No grab bars in the bathroom            9/11/2024   Dental   Dentist two times every year? (!) NO            9/11/2024   Hearing Screening   Hearing concerns? (!) TROUBLE UNDERSTANDING SOFT OR WHISPERED SPEECH.            9/11/2024   Driving Risk Screening   Patient/family members have concerns about driving No            9/11/2024   General Alertness/Fatigue Screening   Have you been more tired than usual lately? No            9/11/2024   Urinary Incontinence Screening   Bothered by leaking urine in past 6 months No             Today's PHQ-9 Score:       9/10/2024    12:53 PM   PHQ-9 SCORE   PHQ-9 Total Score MyChart 3 (Minimal depression)   PHQ-9 Total Score 3         9/11/2024   Substance Use   Alcohol more than 3/day or more than 7/wk No   Do you have a current opioid prescription? No   How severe/bad is pain from 1 to 10? 1/10   Do you use any other  substances recreationally? No        Social History     Tobacco Use    Smoking status: Never    Smokeless tobacco: Never   Vaping Use    Vaping status: Never Used   Substance Use Topics    Alcohol use: Yes     Alcohol/week: 4.2 standard drinks of alcohol     Comment: glass of Wine, 4-5 times a week    Drug use: No           9/11/2024   AAA Screening   Family history of Abdominal Aortic Aneurysm (AAA)? No      Last PSA:   Prostate Specific Antigen Screen   Date Value Ref Range Status   08/09/2021 2.65 0.00 - 4.00 ug/L Final     PSA Tumor Marker   Date Value Ref Range Status   04/08/2024 3.57 0.00 - 4.50 ng/mL Final     ASCVD Risk   The 10-year ASCVD risk score (Alanna CUNHA, et al., 2019) is: 16.2%    Values used to calculate the score:      Age: 68 years      Sex: Male      Is Non- : No      Diabetic: No      Tobacco smoker: No      Systolic Blood Pressure: 128 mmHg      Is BP treated: Yes      HDL Cholesterol: 48 mg/dL      Total Cholesterol: 155 mg/dL          Reviewed and updated as needed this visit by Provider   Tobacco  Allergies  Meds  Problems  Med Hx  Surg Hx  Fam Hx  Soc   Hx Sexual Activity          Current providers sharing in care for this patient include:  Patient Care Team:  Alberta Fan DO as PCP - General (Internal Medicine)  Alberta Fan DO as Assigned PCP  Collins Trejo MD as Assigned Musculoskeletal Provider    The following health maintenance items are reviewed in Epic and correct as of today:  Health Maintenance   Topic Date Due    RSV VACCINE (1 - Risk 60-74 years 1-dose series) Never done    INFLUENZA VACCINE (1) 09/01/2024    BMP  09/06/2024    LIPID  09/06/2024    MICROALBUMIN  09/06/2024    MEDICARE ANNUAL WELLNESS VISIT  09/06/2024    HEMOGLOBIN  09/06/2024    COVID-19 Vaccine (6 - 2023-24 season) 10/01/2024 (Originally 9/1/2024)    FALL RISK ASSESSMENT  09/11/2025    GLUCOSE  09/06/2026    COLORECTAL CANCER SCREENING  10/20/2027    ADVANCE  "CARE PLANNING  09/08/2028    DTAP/TDAP/TD IMMUNIZATION (4 - Td or Tdap) 10/16/2031    HEPATITIS C SCREENING  Completed    PHQ-2 (once per calendar year)  Completed    Pneumococcal Vaccine: 65+ Years  Completed    URINALYSIS  Completed    ZOSTER IMMUNIZATION  Completed    HPV IMMUNIZATION  Aged Out    MENINGITIS IMMUNIZATION  Aged Out    RSV MONOCLONAL ANTIBODY  Aged Out          Objective    Exam  There were no vitals taken for this visit.   Estimated body mass index is 23.79 kg/m  as calculated from the following:    Height as of 8/29/24: 1.905 m (6' 3\").    Weight as of 8/29/24: 86.3 kg (190 lb 4.8 oz).            9/11/2024   Mini Cog   Clock Draw Score 2 Normal   3 Item Recall 3 objects recalled   Mini Cog Total Score 5          Signed Electronically by: Alberta Fan, DO    "

## 2024-09-11 NOTE — TELEPHONE ENCOUNTER
Patient stopped up at the desk, he is wanting Dr. Fan to know that he did fast for his labs that were drawn this morning. If any questions can give him a call    Alberto Banuelos on 9/11/2024 at 11:36 AM

## 2024-09-17 ENCOUNTER — HOSPITAL ENCOUNTER (OUTPATIENT)
Dept: CARDIOLOGY | Facility: OTHER | Age: 69
Discharge: HOME | End: 2024-09-17
Attending: INTERNAL MEDICINE | Admitting: INTERNAL MEDICINE
Payer: MEDICARE

## 2024-09-17 DIAGNOSIS — I48.0 PAROXYSMAL ATRIAL FIBRILLATION (H): ICD-10-CM

## 2024-09-17 LAB — LVEF ECHO: NORMAL

## 2024-09-17 PROCEDURE — 93306 TTE W/DOPPLER COMPLETE: CPT | Mod: 26 | Performed by: INTERNAL MEDICINE

## 2024-09-17 PROCEDURE — 93306 TTE W/DOPPLER COMPLETE: CPT

## 2024-10-07 DIAGNOSIS — I10 ESSENTIAL HYPERTENSION: ICD-10-CM

## 2024-10-09 ENCOUNTER — VIRTUAL VISIT (OUTPATIENT)
Dept: FAMILY MEDICINE | Facility: OTHER | Age: 69
End: 2024-10-09
Attending: FAMILY MEDICINE
Payer: MEDICARE

## 2024-10-09 ENCOUNTER — MYC MEDICAL ADVICE (OUTPATIENT)
Dept: INTERNAL MEDICINE | Facility: OTHER | Age: 69
End: 2024-10-09

## 2024-10-09 DIAGNOSIS — U07.1 INFECTION DUE TO 2019 NOVEL CORONAVIRUS: Primary | ICD-10-CM

## 2024-10-09 DIAGNOSIS — I10 ESSENTIAL HYPERTENSION: ICD-10-CM

## 2024-10-09 PROCEDURE — 99442 PR PHYSICIAN TELEPHONE EVALUATION 11-20 MIN: CPT | Mod: 93 | Performed by: FAMILY MEDICINE

## 2024-10-09 RX ORDER — LOSARTAN POTASSIUM 50 MG/1
25 TABLET ORAL DAILY
Qty: 90 TABLET | Refills: 4 | Status: CANCELLED | OUTPATIENT
Start: 2024-10-09

## 2024-10-09 NOTE — PROGRESS NOTES
Tino is a 68 year old who is being evaluated via a billable telephone visit.    What phone number would you like to be contacted at? [phone]-  How would you like to obtain your AVS? MyChart  Originating Location (pt. Location): Home    Distant Location (provider location):  On-site    Assessment & Plan     Infection due to 2019 novel coronavirus  Discussed treatment options.  He reports having very mild symptoms.  In scratchy throat and some mild bodyaches and head congestion.  He is decided to hold off on Paxlovid.  He is reminded that if he needs to or wants treatment he should do it within for 5 days.  If symptoms worsen through the day or tomorrow he will call for a prescription for Paxlovid.                No follow-ups on file.    Subjective   Tino is a 68 year old, presenting for the following health issues:  Covid Concern (COVID treatment positive 10/08/2024)    HPI       COVID-19 Symptom Review  How many days ago did these symptoms start? 1    Are any of the following symptoms significant for you?  New or worsening difficulty breathing? No  Worsening cough? No  Fever or chills? Yes, the highest temperature was 99.0  Headache: No  Sore throat: YES  Chest pain: No  Diarrhea: no but frequency   Body aches? YES    What treatments has patient tried? Acetaminophen   Does patient live in a nursing home, group home, or shelter? No  Does patient have a way to get food/medications during quarantined? Yes, I have a friend or family member who can help me.                    Objective           Vitals:  No vitals were obtained today due to virtual visit.    Physical Exam   General: Alert and no distress //Respiratory: No audible wheeze, cough, or shortness of breath // Psychiatric:  Appropriate affect, tone, and pace of words            Phone call duration: 14 minutes  Signed Electronically by: Jose Reynolds MD

## 2024-10-09 NOTE — TELEPHONE ENCOUNTER
Pt is wondering about his b/p and the chlorthalidone.     during the day b/ps range from  112 to 116 unless he has vigorous exercise in warm conditions. In that situation, it drops into the 100 to 110 range.     Per OV from 9/11:  Blood pressure is on the low normal side. We discussed options and at this time we will reduce his chlorthalidone to 4 times weekly. If he has continued issues with hypotension it is recommended that we reduce his losartan to 25 mg daily. Continue to monitor     Recommend decreasing Losartan to 25 mg daily.     Luis Alberto'd up order.     Routing to provider to review and respond.  Marielle Hazel RN on 10/9/2024 at 8:56 AM

## 2024-10-09 NOTE — NURSING NOTE
Patient is scheduled for a telephone visit for COVID treatment after testing positive last night 10/08/2024. He is having body aches, scratchy throat, head congestion and low grade fever. Medication Reconciliation: complete.    Roxane Wilson LPN  10/9/2024 9:08 AM

## 2024-10-10 RX ORDER — CHLORTHALIDONE 25 MG/1
12.5 TABLET ORAL DAILY
Qty: 45 TABLET | Refills: 0 | OUTPATIENT
Start: 2024-10-10

## 2024-10-10 NOTE — TELEPHONE ENCOUNTER
[facility] sent Rx request for the following:      Requested Prescriptions   Pending Prescriptions Disp Refills    chlorthalidone (HYGROTON) 25 MG tablet [Pharmacy Med Name: Chlorthalidone 25 MG Oral Tablet] 45 tablet 0     Sig: Take 1/2 (one-half) tablet by mouth once daily       Diuretics (Including Combos) Protocol Passed - 10/7/2024  5:21 PM        Passed - Most recent blood pressure under 140/90 in past 12 months     BP Readings from Last 3 Encounters:   09/11/24 126/76   08/29/24 128/80   09/06/23 118/80       No data recorded            Passed - Medication is active on med list        Passed - Medication indicated for associated diagnosis     Medication is associated with one or more of the following diagnoses:     Edema   Hypertension   Heart Failure   Meniere's Disease   Bilateral localized swelling of lower limbs   Pulmonary Hypertension          Passed - Has GFR on file in past 12 months and most recent value is normal        Passed - Recent (12 mo) or future (90 days) visit within the authorizing provider's specialty     The patient must have completed an in-person or virtual visit within the past 12 months or has a future visit scheduled within the next 90 days with the authorizing provider s specialty.  Urgent care and e-visits do not quality as an office visit for this protocol.          Passed - Patient is age 18 or older             Last Prescription Date:   9/12/2024  Last Fill Qty/Refills:         45, R-4    Last Office Visit:              9/11/2024 Mita   Future Office visit:                  Per LOV note:   Essential hypertension  Blood pressure is on the low normal side.  We discussed options and at this time we will reduce his chlorthalidone to 4 times weekly.  If he has continued issues with hypotension it is recommended that we reduce his losartan to 25 mg daily.  Continue to monitor.  - chlorthalidone (HYGROTON) 25 MG tablet; Take 0.5 tablets (12.5 mg) by mouth four times a week.  - losartan  (COZAAR) 50 MG tablet; Take 1 tablet (50 mg) by mouth daily.    Return in about 1 year (around 9/11/2025) for Annual Review with renewal of all medications, and as needed sooner.     Patient has year supply of refills at pharmacy.    Agnieszka Gamble RN on 10/10/2024 at 1:35 PM

## 2024-10-21 ENCOUNTER — TELEPHONE (OUTPATIENT)
Dept: INTERNAL MEDICINE | Facility: OTHER | Age: 69
End: 2024-10-21
Payer: MEDICARE

## 2024-10-21 NOTE — TELEPHONE ENCOUNTER
Mita-Reason for call: Patient wanting a work in appointment.    Is the appointment for a Hospital Follow up?  No     (If yes - Unable to find an appointment with any provider during the time frame needed. Nurse/Provider - Can this patient be worked into a schedule with PCP or team member?)    Patient is having the following symptoms and/or what is the appt for:  Needs Pre op  for 10.25.24 DOS     The patient is requesting an appointment with  Dr Fan    Was an appointment offered for this call? No    If Yes, what is the date of the appointment?  NA     Preferred method for responding to this message: Telephone Call    Phone number patient can be reached at? Home number on file [phone] (home)    If we can't reach you directly, may we leave a detailed response at the number you provided? Yes    Can this message wait until your PCP/provider returns if unavailable today? Yes

## 2024-10-22 ENCOUNTER — OFFICE VISIT (OUTPATIENT)
Dept: FAMILY MEDICINE | Facility: OTHER | Age: 69
End: 2024-10-22
Attending: FAMILY MEDICINE
Payer: MEDICARE

## 2024-10-22 VITALS
HEIGHT: 75 IN | OXYGEN SATURATION: 96 % | RESPIRATION RATE: 16 BRPM | BODY MASS INDEX: 23.25 KG/M2 | TEMPERATURE: 97.7 F | SYSTOLIC BLOOD PRESSURE: 116 MMHG | HEART RATE: 60 BPM | WEIGHT: 187 LBS | DIASTOLIC BLOOD PRESSURE: 78 MMHG

## 2024-10-22 DIAGNOSIS — Z01.818 PREOPERATIVE EXAMINATION: Primary | ICD-10-CM

## 2024-10-22 DIAGNOSIS — N40.0 BENIGN PROSTATIC HYPERPLASIA, UNSPECIFIED WHETHER LOWER URINARY TRACT SYMPTOMS PRESENT: ICD-10-CM

## 2024-10-22 DIAGNOSIS — N18.31 STAGE 3A CHRONIC KIDNEY DISEASE (H): ICD-10-CM

## 2024-10-22 DIAGNOSIS — C43.61 MALIGNANT MELANOMA OF RIGHT UPPER EXTREMITY INCLUDING SHOULDER (H): ICD-10-CM

## 2024-10-22 DIAGNOSIS — I48.0 PAROXYSMAL ATRIAL FIBRILLATION (H): ICD-10-CM

## 2024-10-22 DIAGNOSIS — U07.1 INFECTION DUE TO 2019 NOVEL CORONAVIRUS: ICD-10-CM

## 2024-10-22 PROCEDURE — G0463 HOSPITAL OUTPT CLINIC VISIT: HCPCS

## 2024-10-22 PROCEDURE — 99214 OFFICE O/P EST MOD 30 MIN: CPT | Performed by: FAMILY MEDICINE

## 2024-10-22 ASSESSMENT — PAIN SCALES - GENERAL: PAINLEVEL: NO PAIN (0)

## 2024-10-22 NOTE — NURSING NOTE
"Chief Complaint   Patient presents with    Pre-Op Exam       Initial /78   Pulse 60   Temp 97.7  F (36.5  C) (Tympanic)   Resp 16   Ht 1.899 m (6' 2.75\")   Wt 84.8 kg (187 lb)   SpO2 96%   BMI 23.53 kg/m   Estimated body mass index is 23.53 kg/m  as calculated from the following:    Height as of this encounter: 1.899 m (6' 2.75\").    Weight as of this encounter: 84.8 kg (187 lb).  Medication Review: complete    The next two questions are to help us understand your food security.  If you are feeling you need any assistance in this area, we have resources available to support you today.          9/11/2024   SDOH- Food Insecurity   Within the past 12 months, did you worry that your food would run out before you got money to buy more? N    N   Within the past 12 months, did the food you bought just not last and you didn t have money to get more? N    N       Multiple values from one day are sorted in reverse-chronological order         Health Care Directive:  Patient has a Health Care Directive on file      Yvonne Bob LPN      "

## 2024-10-22 NOTE — TELEPHONE ENCOUNTER
Patient is already scheduled with Dr. Lemon for today 10.22.2024.  Arielle King on 10/22/2024 at 9:34 AM

## 2024-10-22 NOTE — PROGRESS NOTES
Preoperative Evaluation  [facility]  [address]  [place] [address]  Phone: [phone]  Fax: [phone]  Primary Provider: Alberta Fan DO  Pre-op Performing Provider: Leticia Lemon DO  Oct 22, 2024           10/22/2024   Surgical Information   What procedure is being done? expand margin on a biopsy   Facility or [facility] where procedure[facility]surgery [facility]   Who is doing the procedure / surgery? Dr Luciana Rubalcava   Date of surgery / procedure: october 25 2024   Time of surgery / procedure: ?   Where do you plan to recover after surgery? at home with family        Fax number for surgical facility: [phone]    Assessment & Plan     The proposed surgical procedure is considered LOW risk.    1. Preoperative examination (Primary)  2. Malignant melanoma of right upper extremity including shoulder (H)  Planning wide excision.    3. Infection due to 2019 novel coronavirus  Recent, but symptoms resolved.  Normal cardiopulmonary exam today.    4. Stage 3a chronic kidney disease (H)  Chronic, recent labs from 9/2024 reviewed and stable.    5. Benign prostatic hyperplasia, unspecified whether lower urinary tract symptoms present  Chronic, no acute concerns to interfere with surgical plans.    6. Paroxysmal atrial fibrillation (H) - hx of cardioversion  Chronic, s/p cardioversion and in NSR.  Recent Echo reviewed and normal.                - No identified additional risk factors other than previously addressed    Preoperative Medication Instructions  Antiplatelet or Anticoagulation Medication Instructions   - Bleeding risk is low for this procedure (e.g. dental, skin, cataract).    Additional Medication Instructions  Take all scheduled medications on the day of surgery    Recommendation  Approval given to proceed with proposed procedure, without further diagnostic evaluation.    Anton Jiménez is a 68 year old, presenting for the following:  Pre-Op  Exam          10/22/2024    10:44 AM   Additional Questions   Roomed by RONNI Russell   Accompanied by self     HPI related to upcoming procedure: wide excision needing to be performed for recently removed melanoma on right shoulder    Recent Covid URI; resolved symptoms (10/9/24 tested positive with symptoms a few days prior).    Had retinal reattachment surgery earlier this year without difficulty; colonoscopy in 2022 without difficulty.  No major surgery.    Able to walk up a flight of stairs without ARROYO, CP, SOB, diaphoresis.    No known family history of bleeding disorders.  Some cousins with blood clots; but no personal history.        10/22/2024   Pre-Op Questionnaire   Have you ever had a heart attack or stroke? No   Have you ever had surgery on your heart or blood vessels, such as a stent placement, a coronary artery bypass, or surgery on an artery in your head, neck, heart, or legs? (!) YES had an ablation Jan 2020   Do you have chest pain with activity? No   Do you have a history of heart failure? No   Do you currently have a cold, bronchitis or symptoms of other infection? No   Do you have a cough, shortness of breath, or wheezing? No   Do you or anyone in your family have previous history of blood clots? (!) YES cousin that had DVT's    Do you or does anyone in your family have a serious bleeding problem such as prolonged bleeding following surgeries or cuts? No   Have you ever had problems with anemia or been told to take iron pills? No   Have you had any abnormal blood loss such as black, tarry or bloody stools? No   Have you ever had a blood transfusion? No   Are you willing to have a blood transfusion if it is medically needed before, during, or after your surgery? Yes   Have you or any of your relatives ever had problems with anesthesia? No   Do you have sleep apnea, excessive snoring or daytime drowsiness? No   Do you have any artifical heart valves or other implanted medical devices like a  pacemaker, defibrillator, or continuous glucose monitor? No   Do you have artificial joints? No   Are you allergic to latex? No        Health Care Directive  Patient has a Health Care Directive on file      Preoperative Review of    reviewed - no record of controlled substances prescribed.    Status of Chronic Conditions:  A-FIB - Patient has a history of  pA-fib s/p cardioversion. Remains on bASA therapy.  Denies significant symptoms of lightheadedness, palpitations or dyspnea.     HYPERTENSION - Patient has longstanding history of HTN , currently denies any symptoms referable to elevated blood pressure. Specifically denies chest pain, palpitations, dyspnea, orthopnea, PND or peripheral edema. Blood pressure readings have been in normal range. Current medication regimen is as listed below. Patient denies any side effects of medication.     RENAL INSUFFICIENCY - Patient has a longstanding history of mild-moderate chronic renal insufficiency. Last Cr reviewed from 9/11/24: 1.28 (GFR 61).      Patient Active Problem List    Diagnosis Date Noted    Infection due to 2019 novel coronavirus 10/09/2024     Priority: Medium    Chronic kidney disease, stage 3 (H) 08/09/2021     Priority: Medium    Thrombocytopenia (H) 03/18/2019     Priority: Medium    Paroxysmal atrial fibrillation (H) - hx of cardioversion 11/09/2017     Priority: Medium    Benign prostatic hyperplasia 09/26/2013     Priority: Medium      Past Medical History:   Diagnosis Date    Atrial fibrillation (H) 10/31/2014    Calculus of kidney     passed spontaneous, age of 30    Detached retina, right     Enlarged prostate without lower urinary tract symptoms (luts) 09/26/2013    HTN (hypertension) 11/13/2019    Injury of hand, left, initial encounter 10/2021    fracture and laceration - splitting wood    Mixed hyperlipidemia 03/18/2019    Other specified glaucoma     right eye    Squamous cell carcinoma of skin of face      Past Surgical History:  "  Procedure Laterality Date    CARDIOVERSION  02/2017    COLONOSCOPY  2017    COLONOSCOPY  10/20/2022    Joaquin Cortes MD, [facility]    EP ABLATION ATRIAL FLUTTER  01/28/2020    FINGER SURGERY Left 2021    repair, left long finger injury    HERNIA REPAIR Right 1998    REPAIR RETINACULAR OPEN MEDIAL OR LATERAL      Retinal hole     Current Outpatient Medications   Medication Sig Dispense Refill    aspirin 81 MG EC tablet Take 81 mg by mouth daily      chlorthalidone (HYGROTON) 25 MG tablet Take 0.5 tablets (12.5 mg) by mouth four times a week. 45 tablet 4    latanoprost (XALATAN) 0.005 % ophthalmic solution       losartan (COZAAR) 50 MG tablet Take 1 tablet (50 mg) by mouth daily. 90 tablet 4    Multiple Vitamin (MULTIVITAMIN ADULT PO)          Allergies   Allergen Reactions    Brimonidine Tartrate Other (See Comments)     Other reaction(s): Conjunctivitis        Social History     Tobacco Use    Smoking status: Never    Smokeless tobacco: Never   Substance Use Topics    Alcohol use: Yes     Alcohol/week: 4.2 standard drinks of alcohol     Comment: glass of Wine, 4-5 times a week       History   Drug Use No           Objective    /78   Pulse 60   Temp 97.7  F (36.5  C) (Tympanic)   Resp 16   Ht 1.899 m (6' 2.75\")   Wt 84.8 kg (187 lb)   SpO2 96%   BMI 23.53 kg/m     Estimated body mass index is 23.53 kg/m  as calculated from the following:    Height as of this encounter: 1.899 m (6' 2.75\").    Weight as of this encounter: 84.8 kg (187 lb).  Physical Exam  GENERAL: alert and no distress  EYES: Eyes grossly normal to inspection, PERRL and conjunctivae and sclerae normal  HENT: ear canals and TM's normal, nose and mouth without ulcers or lesions  NECK: no adenopathy, no asymmetry, masses, or scars  RESP: lungs clear to auscultation - no rales, rhonchi or wheezes  CV: regular rate and rhythm, normal S1 S2, no S3 or S4, no murmur, click or rub, no peripheral edema  PSYCH: mentation appears " normal, affect normal/bright    Recent Labs   Lab Test 09/11/24  0944   HGB 13.6         POTASSIUM 4.1   CR 1.28*        Diagnostics  No labs were ordered during this visit. Recent labs from 9/11/24 were stable including:  Hgb: 13.6  Cr: 1.28 (GFR 61)  Na: 135  K: 4.1    No EKG required for low risk surgery (cataract, skin procedure, breast biopsy, etc).  Recent Echo reviewed and normal/stable.  No a-fib noted.    Revised Cardiac Risk Index (RCRI)  The patient has the following serious cardiovascular risks for perioperative complications:   - No serious cardiac risks = 0 points     RCRI Interpretation: 0 points: Class I (very low risk - 0.4% complication rate)         Signed Electronically by: Leticia Lemon DO  A copy of this evaluation report is provided to the requesting physician.

## 2024-10-23 NOTE — PATIENT INSTRUCTIONS
How to Take Your Medication Before Surgery  Preoperative Medication Instructions   Antiplatelet or Anticoagulation Medication Instructions   - Bleeding risk is low for this procedure (e.g. dental, skin, cataract).    Additional Medication Instructions  Take all scheduled medications on the day of surgery       Preparing for Your Surgery  For Adults  Getting started  In most cases, a nurse will call to review your health history and instructions. They will give you an arrival time based on your scheduled surgery time. Please be ready to share:  Your doctor's clinic name and phone number  Your medical, surgical, and anesthesia history  A list of allergies and sensitivities  A list of medicines, including herbal treatments and over-the-counter drugs  Whether the patient has a legal guardian (ask how to send us the papers in advance)  Note: You may not receive a call if you were seen at our PAC (Preoperative Assessment Center).  Please tell us if you're pregnant--or if there's any chance you might be pregnant. Some surgeries may injure a fetus (unborn baby), so they require a pregnancy test. Surgeries that are safe for a fetus don't always need a test, and you can choose whether to have one.   Preparing for surgery  Within 10 to 30 days of surgery: Have a pre-op exam (sometimes called an H&P, or History and Physical). This can be done at a clinic or pre-operative center.  If you're having a , you may not need this exam. Talk to your care team.  At your pre-op exam, talk to your care team about all medicines you take. (This includes CBD oil and any drugs, such as THC, marijuana, and other forms of cannabis.) If you need to stop any medicine before surgery, ask when to start taking it again.  This is for your safety. Many medicines and drugs can make you bleed too much during surgery. Some change how well surgery (anesthesia) drugs work.  Call your insurance company to let them know you're having surgery. (If you  don't have insurance, call [phone].)  Call your clinic if there's any change in your health. This includes a scrape or scratch near the surgery site, or any signs of a cold (sore throat, runny nose, cough, rash, fever).  Eating and drinking guidelines  For your safety: Unless your surgeon tells you otherwise, follow the guidelines below.  Eat and drink as normal until 8 hours before you arrive for surgery. After that, no food or milk. You can spit out gum when you arrive.  Drink clear liquids until 2 hours before you arrive. These are liquids you can see through, like water, Gatorade, and Propel Water. They also include plain black coffee and tea (no cream or milk).  No alcohol for 24 hours before you arrive. The night before surgery, stop any drinks that contain THC.  If your care team tells you to take medicine on the morning of surgery, it's okay to take it with a sip of water. No other medicines or drugs are allowed (including CBD oil)--follow your care team's instructions.  If you have questions the day of surgery, call your hospital or surgery center.   Preventing infection  Shower or bathe the night before and the morning of surgery. Follow the instructions your clinic gave you. (If no instructions, use regular soap.)  Don't shave or clip hair near your surgery site. We'll remove the hair if needed.  Don't smoke or vape the morning of surgery. No chewing tobacco for 6 hours before you arrive. A nicotine patch is okay. You may spit out nicotine gum when you arrive.  For some surgeries, the surgeon will tell you to fully quit smoking and nicotine.  We will make every effort to keep you safe from infection. We will:  Clean our hands often with soap and water (or an alcohol-based hand rub).  Clean the skin at your surgery site with a special soap that kills germs.  Give you a special gown to keep you warm. (Cold raises the risk of infection.)  Wear hair covers, masks, gowns, and gloves during surgery.  Give  antibiotic medicine, if prescribed. Not all surgeries need this medicine.  What to bring on the day of surgery  Photo ID and insurance card  Copy of your health care directive, if you have one  Glasses and hearing aids (bring cases)  You can't wear contacts during surgery  Inhaler and eye drops, if you use them (tell us about these when you arrive)  CPAP machine or breathing device, if you use them  A few personal items, if spending the night  If you have . . .  A pacemaker, ICD (cardiac defibrillator), or other implant: Bring the ID card.  An implanted stimulator: Bring the remote control.  A legal guardian: Bring a copy of the certified (court-stamped) guardianship papers.  Please remove any jewelry, including body piercings. Leave jewelry and other valuables at home.  If you're going home the day of surgery  You must have a responsible adult drive you home. They should stay with you overnight as well.  If you don't have someone to stay with you, and you aren't safe to go home alone, we may keep you overnight. Insurance often won't pay for this.  After surgery  If it's hard to control your pain or you need more pain medicine, please call your surgeon's office.  Questions?   If you have any questions for your care team, list them here:   ____________________________________________________________________________________________________________________________________________________________________________________________________________________________________________________________  For informational purposes only. Not to replace the advice of your health care provider. Copyright   2003, 2019 [facility]. All rights reserved. Clinically reviewed by Toribio Chavarria MD. [employer] [id number] - REV 08/24.

## 2024-12-07 ENCOUNTER — MYC REFILL (OUTPATIENT)
Dept: INTERNAL MEDICINE | Facility: OTHER | Age: 69
End: 2024-12-07
Payer: MEDICARE

## 2024-12-07 DIAGNOSIS — I10 ESSENTIAL HYPERTENSION: ICD-10-CM

## 2024-12-09 RX ORDER — LOSARTAN POTASSIUM 50 MG/1
50 TABLET ORAL DAILY
Qty: 90 TABLET | Refills: 4 | OUTPATIENT
Start: 2024-12-09

## 2024-12-09 NOTE — TELEPHONE ENCOUNTER
[facility] sent Rx request for the following:      Requested Prescriptions   Refused Prescriptions Disp Refills    losartan (COZAAR) 50 MG tablet 90 tablet 4     Sig: Take 1 tablet (50 mg) by mouth daily.     Unable to complete prescription refill per RN Medication Refill Policy.   Refill request too soon. There should be 3 refills on file.  Alvina Cantrell RN on 12/9/2024 at 11:36 AM

## 2024-12-18 ENCOUNTER — OFFICE VISIT (OUTPATIENT)
Dept: ORTHOPEDICS | Facility: OTHER | Age: 69
End: 2024-12-18
Attending: ORTHOPAEDIC SURGERY
Payer: MEDICARE

## 2024-12-18 DIAGNOSIS — G89.29 CHRONIC PAIN OF LEFT KNEE: Primary | ICD-10-CM

## 2024-12-18 DIAGNOSIS — M25.562 CHRONIC PAIN OF LEFT KNEE: Primary | ICD-10-CM

## 2024-12-18 PROCEDURE — 250N000009 HC RX 250: Performed by: ORTHOPAEDIC SURGERY

## 2024-12-18 PROCEDURE — G0463 HOSPITAL OUTPT CLINIC VISIT: HCPCS

## 2024-12-18 PROCEDURE — 250N000011 HC RX IP 250 OP 636: Mod: JZ | Performed by: ORTHOPAEDIC SURGERY

## 2024-12-18 RX ORDER — LIDOCAINE HYDROCHLORIDE 10 MG/ML
4 INJECTION, SOLUTION INFILTRATION; PERINEURAL ONCE
Status: COMPLETED | OUTPATIENT
Start: 2024-12-18 | End: 2024-12-18

## 2024-12-18 RX ORDER — TRIAMCINOLONE ACETONIDE 40 MG/ML
40 INJECTION, SUSPENSION INTRA-ARTICULAR; INTRAMUSCULAR ONCE
Status: COMPLETED | OUTPATIENT
Start: 2024-12-18 | End: 2024-12-18

## 2024-12-18 RX ADMIN — LIDOCAINE HYDROCHLORIDE 4 ML: 10 INJECTION, SOLUTION INFILTRATION; PERINEURAL at 09:34

## 2024-12-18 RX ADMIN — TRIAMCINOLONE ACETONIDE 40 MG: 40 INJECTION, SUSPENSION INTRA-ARTICULAR; INTRAMUSCULAR at 09:34

## 2024-12-18 NOTE — PROGRESS NOTES
S: patient is here for injection of left knee. Prior injection has worked well.    O: left knee exam shows crepitance and swelling. Neuro exam is intact. Mild swelling noted    Procedure note  Patient left knee is prepped and draped and injected with 4cc of 1% lidocaine and 40 mg of kenalog under sterile conditions. Patient tolerated the procedure well    A left knee arthritis    P: repeat injection as appropriate. Long term will plan for joint reconstruction once injections are no longer effective.  We will also explore potential Visco lubricant options for patient should this cortisone not work as well.

## 2024-12-26 ENCOUNTER — TELEPHONE (OUTPATIENT)
Dept: INTERNAL MEDICINE | Facility: OTHER | Age: 69
End: 2024-12-26
Payer: MEDICARE

## 2025-03-04 ENCOUNTER — TRANSFERRED RECORDS (OUTPATIENT)
Dept: HEALTH INFORMATION MANAGEMENT | Facility: OTHER | Age: 70
End: 2025-03-04
Payer: MEDICARE

## 2025-04-24 ENCOUNTER — OFFICE VISIT (OUTPATIENT)
Dept: FAMILY MEDICINE | Facility: OTHER | Age: 70
End: 2025-04-24
Attending: FAMILY MEDICINE
Payer: MEDICARE

## 2025-04-24 VITALS
BODY MASS INDEX: 23.66 KG/M2 | SYSTOLIC BLOOD PRESSURE: 110 MMHG | WEIGHT: 188 LBS | HEART RATE: 56 BPM | DIASTOLIC BLOOD PRESSURE: 64 MMHG | RESPIRATION RATE: 18 BRPM | OXYGEN SATURATION: 98 % | TEMPERATURE: 97.4 F

## 2025-04-24 DIAGNOSIS — C44.310 BCC (BASAL CELL CARCINOMA), FACE: ICD-10-CM

## 2025-04-24 DIAGNOSIS — Z48.02 ENCOUNTER FOR REMOVAL OF SUTURES: Primary | ICD-10-CM

## 2025-04-24 PROBLEM — U07.1 INFECTION DUE TO 2019 NOVEL CORONAVIRUS: Status: RESOLVED | Noted: 2024-10-09 | Resolved: 2025-04-24

## 2025-04-24 PROCEDURE — G0463 HOSPITAL OUTPT CLINIC VISIT: HCPCS

## 2025-04-24 ASSESSMENT — PAIN SCALES - GENERAL: PAINLEVEL_OUTOF10: NO PAIN (0)

## 2025-04-24 NOTE — PROGRESS NOTES
SUBJECTIVE:   Darron Tatum is a 69 year old male who presents to clinic today for the following health issues:    Suture Removal    History of Present Illness       Reason for visit:  Removal of stitches from a Mohs procedure in [place].   He is taking medications regularly.    Patient arrives here for suture removal.  He did undergo basal cell carcinoma removal via Mohs surgery left cheek 1 week ago.    Patient Active Problem List    Diagnosis Date Noted    BCC (basal cell carcinoma), face 04/24/2025     Priority: Medium    Malignant melanoma of right upper extremity including shoulder (H) 10/22/2024     Priority: Medium    Chronic kidney disease, stage 3 (H) 08/09/2021     Priority: Medium    Thrombocytopenia 03/18/2019     Priority: Medium    Paroxysmal atrial fibrillation (H) - hx of cardioversion 11/09/2017     Priority: Medium    Benign prostatic hyperplasia 09/26/2013     Priority: Medium     Past Medical History:   Diagnosis Date    Atrial fibrillation (H) 10/31/2014    Calculus of kidney     passed spontaneous, age of 30    Detached retina, right     Enlarged prostate without lower urinary tract symptoms (luts) 09/26/2013    HTN (hypertension) 11/13/2019    Injury of hand, left, initial encounter 10/2021    fracture and laceration - splitting wood    Mixed hyperlipidemia 03/18/2019    Other specified glaucoma     right eye    Squamous cell carcinoma of skin of face       Past Surgical History:   Procedure Laterality Date    CARDIOVERSION  02/2017    COLONOSCOPY  2017    COLONOSCOPY  10/20/2022    Joaquin Cortes MD, [facility]    EP ABLATION ATRIAL FLUTTER  01/28/2020    FINGER SURGERY Left 2021    repair, left long finger injury    HERNIA REPAIR Right 1998    RETINAL REATTACHMENT Right 04/2024       Review of Systems     OBJECTIVE:     /64   Pulse 56   Temp 97.4  F (36.3  C)   Resp 18   Wt 85.3 kg (188 lb)   SpO2 98%   BMI 23.66 kg/m    Body mass index is 23.66 kg/m .  Physical  Exam  Skin:     Comments: About a 1-1/2 cm incision on the left cheek.  Healing well.  Running sutures placed.  These were removed.  Portion of the incision did open up no signs of infection or pus.  Steri-Strips applied   Neurological:      Mental Status: He is alert.         Diagnostic Test Results:  none     ASSESSMENT/PLAN:     Sutures removed without difficulty    (Z48.02) Encounter for removal of sutures  (primary encounter diagnosis)  Steri-Strips applied.  Follow-up if any changes occur    (C44.596) BCC (basal cell carcinoma), face  Comment:   Plan:       Jose Reynolds MD  [facility]

## 2025-04-24 NOTE — NURSING NOTE
Patient here for suture removal from the left cheek. Patient had a MOHS procedure with  on the left cheek for basal cell carcinoma. Medication Reconciliation: complete.    Roxane Wilson LPN  4/24/2025 7:54 AM

## 2025-07-28 ENCOUNTER — MYC MEDICAL ADVICE (OUTPATIENT)
Dept: INTERNAL MEDICINE | Facility: OTHER | Age: 70
End: 2025-07-28
Payer: MEDICARE

## 2025-07-29 NOTE — TELEPHONE ENCOUNTER
Wondering if this supplement is ok to take. Has been taking for the past 2 months for knee pain.         My concerns would be the blood thinning effects of Turmeric and his ASA.     Routing to provider to review and respond.  Marielle Hazel RN on 7/29/2025 at 10:57 AM

## 2025-07-29 NOTE — TELEPHONE ENCOUNTER
Patient should be aware that turmeric is generally safe and effective for knee pain in patients with knee osteoarthritis, but caution should be exercised when used concurrently with aspirin due to a potential, but unproven, increased bleeding risk.   SERGIO TALBERT CNP on 7/29/2025 at 1:24 PM

## (undated) RX ORDER — LIDOCAINE HYDROCHLORIDE 10 MG/ML
INJECTION, SOLUTION INFILTRATION; PERINEURAL
Status: DISPENSED
Start: 2024-05-01

## (undated) RX ORDER — BUPIVACAINE HYDROCHLORIDE 5 MG/ML
INJECTION, SOLUTION PERINEURAL
Status: DISPENSED
Start: 2021-10-16

## (undated) RX ORDER — LIDOCAINE HYDROCHLORIDE 10 MG/ML
INJECTION, SOLUTION INFILTRATION; PERINEURAL
Status: DISPENSED
Start: 2024-09-11

## (undated) RX ORDER — DILTIAZEM HYDROCHLORIDE 5 MG/ML
INJECTION INTRAVENOUS
Status: DISPENSED
Start: 2019-10-05

## (undated) RX ORDER — TRIAMCINOLONE ACETONIDE 40 MG/ML
INJECTION, SUSPENSION INTRA-ARTICULAR; INTRAMUSCULAR
Status: DISPENSED
Start: 2024-12-18

## (undated) RX ORDER — LIDOCAINE HYDROCHLORIDE 10 MG/ML
INJECTION, SOLUTION EPIDURAL; INFILTRATION; INTRACAUDAL; PERINEURAL
Status: DISPENSED
Start: 2021-10-16

## (undated) RX ORDER — SODIUM CHLORIDE 9 MG/ML
INJECTION, SOLUTION INTRAVENOUS
Status: DISPENSED
Start: 2019-10-05

## (undated) RX ORDER — TRIAMCINOLONE ACETONIDE 40 MG/ML
INJECTION, SUSPENSION INTRA-ARTICULAR; INTRAMUSCULAR
Status: DISPENSED
Start: 2024-09-11

## (undated) RX ORDER — LIDOCAINE HYDROCHLORIDE 10 MG/ML
INJECTION, SOLUTION INFILTRATION; PERINEURAL
Status: DISPENSED
Start: 2024-12-18

## (undated) RX ORDER — TRIAMCINOLONE ACETONIDE 40 MG/ML
INJECTION, SUSPENSION INTRA-ARTICULAR; INTRAMUSCULAR
Status: DISPENSED
Start: 2024-05-01

## (undated) RX ORDER — KETOROLAC TROMETHAMINE 15 MG/ML
INJECTION, SOLUTION INTRAMUSCULAR; INTRAVENOUS
Status: DISPENSED
Start: 2021-10-16